# Patient Record
Sex: FEMALE | Race: WHITE | NOT HISPANIC OR LATINO | Employment: UNEMPLOYED | URBAN - METROPOLITAN AREA
[De-identification: names, ages, dates, MRNs, and addresses within clinical notes are randomized per-mention and may not be internally consistent; named-entity substitution may affect disease eponyms.]

---

## 2021-04-15 LAB — CRC RECOMMENDATION EXT: NORMAL

## 2021-09-17 ENCOUNTER — OFFICE VISIT (OUTPATIENT)
Dept: PRIMARY CARE CLINIC | Facility: CLINIC | Age: 56
End: 2021-09-17
Payer: MEDICAID

## 2021-09-17 VITALS
HEIGHT: 66 IN | DIASTOLIC BLOOD PRESSURE: 97 MMHG | WEIGHT: 131 LBS | OXYGEN SATURATION: 94 % | BODY MASS INDEX: 21.05 KG/M2 | SYSTOLIC BLOOD PRESSURE: 153 MMHG | HEART RATE: 120 BPM | RESPIRATION RATE: 18 BRPM

## 2021-09-17 DIAGNOSIS — I10 HYPERTENSION, UNSPECIFIED TYPE: ICD-10-CM

## 2021-09-17 DIAGNOSIS — Z23 IMMUNIZATION DUE: ICD-10-CM

## 2021-09-17 DIAGNOSIS — H02.825 CYST OF LEFT LOWER EYELID: Primary | ICD-10-CM

## 2021-09-17 DIAGNOSIS — F17.210 NICOTINE DEPENDENCE, CIGARETTES, UNCOMPLICATED: ICD-10-CM

## 2021-09-17 DIAGNOSIS — M25.559 HIP PAIN: ICD-10-CM

## 2021-09-17 DIAGNOSIS — F17.200 SMOKER: ICD-10-CM

## 2021-09-17 PROCEDURE — 99204 OFFICE O/P NEW MOD 45 MIN: CPT | Mod: 25,S$GLB,, | Performed by: INTERNAL MEDICINE

## 2021-09-17 PROCEDURE — 90732 PNEUMOCOCCAL POLYSACCHARIDE VACCINE 23-VALENT =>2YO SQ IM: ICD-10-PCS | Mod: S$GLB,,, | Performed by: INTERNAL MEDICINE

## 2021-09-17 PROCEDURE — 90471 IMMUNIZATION ADMIN: CPT | Mod: S$GLB,,, | Performed by: INTERNAL MEDICINE

## 2021-09-17 PROCEDURE — 90471 PNEUMOCOCCAL POLYSACCHARIDE VACCINE 23-VALENT =>2YO SQ IM: ICD-10-PCS | Mod: S$GLB,,, | Performed by: INTERNAL MEDICINE

## 2021-09-17 PROCEDURE — 99204 PR OFFICE/OUTPT VISIT, NEW, LEVL IV, 45-59 MIN: ICD-10-PCS | Mod: 25,S$GLB,, | Performed by: INTERNAL MEDICINE

## 2021-09-17 PROCEDURE — 90732 PPSV23 VACC 2 YRS+ SUBQ/IM: CPT | Mod: S$GLB,,, | Performed by: INTERNAL MEDICINE

## 2021-09-17 RX ORDER — NORTRIPTYLINE HYDROCHLORIDE 50 MG/1
50 CAPSULE ORAL NIGHTLY
COMMUNITY
Start: 2021-09-03 | End: 2021-10-01 | Stop reason: SDUPTHER

## 2021-09-17 RX ORDER — GABAPENTIN 300 MG/1
300 CAPSULE ORAL 2 TIMES DAILY
COMMUNITY
Start: 2021-09-03 | End: 2021-10-01 | Stop reason: SDUPTHER

## 2021-09-17 RX ORDER — PANTOPRAZOLE SODIUM 40 MG/1
40 TABLET, DELAYED RELEASE ORAL DAILY
COMMUNITY
Start: 2021-08-30 | End: 2021-10-01 | Stop reason: SDUPTHER

## 2021-09-17 RX ORDER — ONDANSETRON 4 MG/1
4 TABLET, FILM COATED ORAL 2 TIMES DAILY PRN
COMMUNITY
Start: 2021-09-03 | End: 2021-10-01 | Stop reason: SDUPTHER

## 2021-09-18 LAB
BASOPHILS # BLD AUTO: 89 CELLS/UL (ref 0–200)
BASOPHILS NFR BLD AUTO: 1 %
BUN SERPL-MCNC: 6 MG/DL (ref 7–25)
BUN/CREAT SERPL: 9 (CALC) (ref 6–22)
CALCIUM SERPL-MCNC: 10.3 MG/DL (ref 8.6–10.3)
CHLORIDE SERPL-SCNC: 100 MMOL/L (ref 98–110)
CHOLEST SERPL-MCNC: 207 MG/DL
CHOLEST/HDLC SERPL: 2.5 (CALC)
CO2 SERPL-SCNC: 25 MMOL/L (ref 20–32)
CREAT SERPL-MCNC: 0.67 MG/DL (ref 0.7–1.33)
EOSINOPHIL # BLD AUTO: 169 CELLS/UL (ref 15–500)
EOSINOPHIL NFR BLD AUTO: 1.9 %
ERYTHROCYTE [DISTWIDTH] IN BLOOD BY AUTOMATED COUNT: 13.1 % (ref 11–15)
GLUCOSE SERPL-MCNC: 95 MG/DL (ref 65–99)
HCT VFR BLD AUTO: 47.3 % (ref 38.5–50)
HDLC SERPL-MCNC: 84 MG/DL
HGB BLD-MCNC: 16.3 G/DL (ref 13.2–17.1)
LDLC SERPL CALC-MCNC: 102 MG/DL (CALC)
LYMPHOCYTES # BLD AUTO: 1326 CELLS/UL (ref 850–3900)
LYMPHOCYTES NFR BLD AUTO: 14.9 %
MCH RBC QN AUTO: 33.1 PG (ref 27–33)
MCHC RBC AUTO-ENTMCNC: 34.5 G/DL (ref 32–36)
MCV RBC AUTO: 95.9 FL (ref 80–100)
MONOCYTES # BLD AUTO: 1175 CELLS/UL (ref 200–950)
MONOCYTES NFR BLD AUTO: 13.2 %
NEUTROPHILS # BLD AUTO: 6141 CELLS/UL (ref 1500–7800)
NEUTROPHILS NFR BLD AUTO: 69 %
NONHDLC SERPL-MCNC: 123 MG/DL (CALC)
PLATELET # BLD AUTO: 315 THOUSAND/UL (ref 140–400)
PMV BLD REES-ECKER: 10.1 FL (ref 7.5–12.5)
POTASSIUM SERPL-SCNC: 4.3 MMOL/L (ref 3.5–5.3)
RBC # BLD AUTO: 4.93 MILLION/UL (ref 4.2–5.8)
SODIUM SERPL-SCNC: 135 MMOL/L (ref 135–146)
TRIGL SERPL-MCNC: 112 MG/DL
TSH SERPL-ACNC: 1.22 MIU/L (ref 0.4–4.5)
WBC # BLD AUTO: 8.9 THOUSAND/UL (ref 3.8–10.8)

## 2021-09-23 ENCOUNTER — TELEPHONE (OUTPATIENT)
Dept: PRIMARY CARE CLINIC | Facility: CLINIC | Age: 56
End: 2021-09-23

## 2021-09-27 DIAGNOSIS — R91.8 LUNG MASS: Primary | ICD-10-CM

## 2021-09-28 ENCOUNTER — OFFICE VISIT (OUTPATIENT)
Dept: HEMATOLOGY/ONCOLOGY | Facility: CLINIC | Age: 56
End: 2021-09-28
Payer: MEDICAID

## 2021-09-28 VITALS
SYSTOLIC BLOOD PRESSURE: 166 MMHG | HEART RATE: 88 BPM | TEMPERATURE: 98 F | DIASTOLIC BLOOD PRESSURE: 91 MMHG | BODY MASS INDEX: 21.69 KG/M2 | HEIGHT: 66 IN | WEIGHT: 135 LBS | OXYGEN SATURATION: 95 % | RESPIRATION RATE: 16 BRPM

## 2021-09-28 DIAGNOSIS — R91.8 MASS OF LEFT LUNG: Primary | ICD-10-CM

## 2021-09-28 DIAGNOSIS — R91.8 MASS OF LEFT LUNG: ICD-10-CM

## 2021-09-28 DIAGNOSIS — R91.8 MASS OF LOWER LOBE OF LEFT LUNG: ICD-10-CM

## 2021-09-28 PROCEDURE — 99205 OFFICE O/P NEW HI 60 MIN: CPT | Mod: S$GLB,,, | Performed by: INTERNAL MEDICINE

## 2021-09-28 PROCEDURE — 99205 PR OFFICE/OUTPT VISIT, NEW, LEVL V, 60-74 MIN: ICD-10-PCS | Mod: S$GLB,,, | Performed by: INTERNAL MEDICINE

## 2021-10-01 DIAGNOSIS — K21.9 GASTROESOPHAGEAL REFLUX DISEASE, UNSPECIFIED WHETHER ESOPHAGITIS PRESENT: ICD-10-CM

## 2021-10-01 DIAGNOSIS — M25.559 HIP PAIN: ICD-10-CM

## 2021-10-01 DIAGNOSIS — I10 HYPERTENSION, UNSPECIFIED TYPE: Primary | ICD-10-CM

## 2021-10-01 RX ORDER — ONDANSETRON 4 MG/1
4 TABLET, FILM COATED ORAL EVERY 12 HOURS PRN
Qty: 180 TABLET | Refills: 0 | Status: SHIPPED | OUTPATIENT
Start: 2021-10-01 | End: 2021-12-30

## 2021-10-01 RX ORDER — PANTOPRAZOLE SODIUM 40 MG/1
40 TABLET, DELAYED RELEASE ORAL DAILY
Qty: 30 TABLET | Refills: 3 | Status: SHIPPED | OUTPATIENT
Start: 2021-10-01 | End: 2022-11-01 | Stop reason: SDUPTHER

## 2021-10-01 RX ORDER — NORTRIPTYLINE HYDROCHLORIDE 50 MG/1
50 CAPSULE ORAL NIGHTLY
Qty: 30 CAPSULE | Refills: 0 | Status: SHIPPED | OUTPATIENT
Start: 2021-10-01 | End: 2021-12-01

## 2021-10-01 RX ORDER — GABAPENTIN 300 MG/1
300 CAPSULE ORAL 2 TIMES DAILY
Qty: 60 CAPSULE | Refills: 3 | Status: SHIPPED | OUTPATIENT
Start: 2021-10-01 | End: 2022-02-02 | Stop reason: SDUPTHER

## 2021-10-04 ENCOUNTER — CLINICAL SUPPORT (OUTPATIENT)
Dept: PULMONOLOGY | Facility: CLINIC | Age: 56
End: 2021-10-04
Payer: MEDICAID

## 2021-10-04 ENCOUNTER — TELEPHONE (OUTPATIENT)
Dept: PRIMARY CARE CLINIC | Facility: CLINIC | Age: 56
End: 2021-10-04

## 2021-10-04 ENCOUNTER — OFFICE VISIT (OUTPATIENT)
Dept: PULMONOLOGY | Facility: CLINIC | Age: 56
End: 2021-10-04
Payer: MEDICAID

## 2021-10-04 DIAGNOSIS — R91.8 MASS OF LEFT LUNG: ICD-10-CM

## 2021-10-04 DIAGNOSIS — Z72.0 TOBACCO USE: ICD-10-CM

## 2021-10-04 DIAGNOSIS — J44.9 CHRONIC OBSTRUCTIVE PULMONARY DISEASE, UNSPECIFIED COPD TYPE: Primary | ICD-10-CM

## 2021-10-04 DIAGNOSIS — R91.8 LUNG MASS: Primary | ICD-10-CM

## 2021-10-04 PROCEDURE — 94729 PR C02/MEMBANE DIFFUSE CAPACITY: ICD-10-PCS | Mod: S$GLB,,, | Performed by: INTERNAL MEDICINE

## 2021-10-04 PROCEDURE — 99204 PR OFFICE/OUTPT VISIT, NEW, LEVL IV, 45-59 MIN: ICD-10-PCS | Mod: 25,S$GLB,, | Performed by: INTERNAL MEDICINE

## 2021-10-04 PROCEDURE — 94060 PR EVAL OF BRONCHOSPASM: ICD-10-PCS | Mod: S$GLB,,, | Performed by: INTERNAL MEDICINE

## 2021-10-04 PROCEDURE — 94729 DIFFUSING CAPACITY: CPT | Mod: S$GLB,,, | Performed by: INTERNAL MEDICINE

## 2021-10-04 PROCEDURE — 94060 EVALUATION OF WHEEZING: CPT | Mod: S$GLB,,, | Performed by: INTERNAL MEDICINE

## 2021-10-04 PROCEDURE — 94726 PULM FUNCT TST PLETHYSMOGRAP: ICD-10-PCS | Mod: S$GLB,,, | Performed by: INTERNAL MEDICINE

## 2021-10-04 PROCEDURE — 94726 PLETHYSMOGRAPHY LUNG VOLUMES: CPT | Mod: S$GLB,,, | Performed by: INTERNAL MEDICINE

## 2021-10-04 PROCEDURE — 99204 OFFICE O/P NEW MOD 45 MIN: CPT | Mod: 25,S$GLB,, | Performed by: INTERNAL MEDICINE

## 2021-10-04 RX ORDER — ALBUTEROL SULFATE 90 UG/1
2 AEROSOL, METERED RESPIRATORY (INHALATION) EVERY 6 HOURS PRN
Qty: 18 G | Refills: 0 | Status: SHIPPED | OUTPATIENT
Start: 2021-10-04 | End: 2022-01-03 | Stop reason: SDUPTHER

## 2021-10-04 RX ORDER — BUDESONIDE AND FORMOTEROL FUMARATE DIHYDRATE 160; 4.5 UG/1; UG/1
2 AEROSOL RESPIRATORY (INHALATION) EVERY 12 HOURS
Qty: 1 INHALER | Refills: 11 | Status: SHIPPED | OUTPATIENT
Start: 2021-10-04 | End: 2023-01-12 | Stop reason: ALTCHOICE

## 2021-10-06 ENCOUNTER — TELEPHONE (OUTPATIENT)
Dept: HEMATOLOGY/ONCOLOGY | Facility: CLINIC | Age: 56
End: 2021-10-06

## 2021-10-06 ENCOUNTER — TELEPHONE (OUTPATIENT)
Dept: PRIMARY CARE CLINIC | Facility: CLINIC | Age: 56
End: 2021-10-06

## 2021-10-07 ENCOUNTER — TELEPHONE (OUTPATIENT)
Dept: HEMATOLOGY/ONCOLOGY | Facility: CLINIC | Age: 56
End: 2021-10-07

## 2021-10-12 ENCOUNTER — OFFICE VISIT (OUTPATIENT)
Dept: HEMATOLOGY/ONCOLOGY | Facility: CLINIC | Age: 56
End: 2021-10-12
Payer: MEDICAID

## 2021-10-12 VITALS
HEART RATE: 98 BPM | DIASTOLIC BLOOD PRESSURE: 84 MMHG | OXYGEN SATURATION: 94 % | BODY MASS INDEX: 21.53 KG/M2 | SYSTOLIC BLOOD PRESSURE: 150 MMHG | WEIGHT: 133.38 LBS

## 2021-10-12 DIAGNOSIS — Z12.89 ENCOUNTER FOR SCREENING FOR MALIGNANT NEOPLASM OF OTHER SITES: ICD-10-CM

## 2021-10-12 DIAGNOSIS — R91.8 MASS OF LOWER LOBE OF LEFT LUNG: ICD-10-CM

## 2021-10-12 DIAGNOSIS — R91.8 MASS OF LEFT LUNG: Primary | ICD-10-CM

## 2021-10-12 PROCEDURE — 99214 OFFICE O/P EST MOD 30 MIN: CPT | Mod: S$GLB,,, | Performed by: INTERNAL MEDICINE

## 2021-10-12 PROCEDURE — 99214 PR OFFICE/OUTPT VISIT, EST, LEVL IV, 30-39 MIN: ICD-10-PCS | Mod: S$GLB,,, | Performed by: INTERNAL MEDICINE

## 2021-10-13 ENCOUNTER — TELEPHONE (OUTPATIENT)
Dept: HEMATOLOGY/ONCOLOGY | Facility: CLINIC | Age: 56
End: 2021-10-13

## 2021-10-13 ENCOUNTER — OFFICE VISIT (OUTPATIENT)
Dept: PRIMARY CARE CLINIC | Facility: CLINIC | Age: 56
End: 2021-10-13
Payer: MEDICAID

## 2021-10-13 VITALS
WEIGHT: 134.19 LBS | BODY MASS INDEX: 21.57 KG/M2 | RESPIRATION RATE: 16 BRPM | HEIGHT: 66 IN | DIASTOLIC BLOOD PRESSURE: 76 MMHG | SYSTOLIC BLOOD PRESSURE: 122 MMHG | OXYGEN SATURATION: 95 % | TEMPERATURE: 98 F | HEART RATE: 104 BPM

## 2021-10-13 DIAGNOSIS — Z12.39 ENCOUNTER FOR SCREENING FOR MALIGNANT NEOPLASM OF BREAST, UNSPECIFIED SCREENING MODALITY: Primary | ICD-10-CM

## 2021-10-13 DIAGNOSIS — F17.200 SMOKER: ICD-10-CM

## 2021-10-13 DIAGNOSIS — R91.8 MASS OF LEFT LUNG: ICD-10-CM

## 2021-10-13 PROCEDURE — 99214 PR OFFICE/OUTPT VISIT, EST, LEVL IV, 30-39 MIN: ICD-10-PCS | Mod: S$GLB,,, | Performed by: INTERNAL MEDICINE

## 2021-10-13 PROCEDURE — 99214 OFFICE O/P EST MOD 30 MIN: CPT | Mod: S$GLB,,, | Performed by: INTERNAL MEDICINE

## 2021-10-19 DIAGNOSIS — R91.8 MASS OF LEFT LUNG: Primary | ICD-10-CM

## 2021-10-25 ENCOUNTER — TELEPHONE (OUTPATIENT)
Dept: PULMONOLOGY | Facility: CLINIC | Age: 56
End: 2021-10-25
Payer: MEDICAID

## 2021-10-28 ENCOUNTER — TELEPHONE (OUTPATIENT)
Dept: PULMONOLOGY | Facility: CLINIC | Age: 56
End: 2021-10-28
Payer: MEDICAID

## 2021-11-03 ENCOUNTER — TELEPHONE (OUTPATIENT)
Dept: PRIMARY CARE CLINIC | Facility: CLINIC | Age: 56
End: 2021-11-03
Payer: MEDICAID

## 2021-11-08 LAB
ABS NRBC COUNT: 0 THOU/UL (ref 0–0.01)
ABSOLUTE BASOPHIL: 0.1 10*3/UL (ref 0–0.3)
ABSOLUTE EOSINOPHIL: 0.2 10*3/UL (ref 0–0.6)
ABSOLUTE IMMATURE GRAN: 0.04 THOU/UL (ref 0–0.03)
ABSOLUTE LYMPHOCYTE: 1.1 10*3/UL (ref 1.2–4)
ABSOLUTE MONOCYTE: 1 10*3/UL (ref 0.1–0.8)
ALBUMIN SERPL BCP-MCNC: 3.3 G/DL (ref 3.4–5)
ALP SERPL-CCNC: 142 U/L (ref 45–117)
ALT SERPL W P-5'-P-CCNC: 74 U/L (ref 13–56)
ANION GAP SERPL CALC-SCNC: 8 MMOL/L (ref 3–11)
APTT PPP: 33.3 SEC (ref 25.1–36.5)
AST SERPL-CCNC: 46 U/L (ref 15–37)
BASOPHILS NFR BLD: 1.1 % (ref 0–3)
BILIRUB SERPL-MCNC: 0.5 MG/DL (ref 0.2–1)
BUN SERPL-MCNC: 4 MG/DL (ref 7–18)
CALCIUM BLD-MCNC: POSITIVE MG/DL
CALCIUM SERPL-MCNC: 9.6 MG/DL (ref 8.5–10.1)
CHLORIDE SERPL-SCNC: 101 MMOL/L (ref 98–107)
CO2 SERPL-SCNC: 26 MMOL/L (ref 21–32)
COVID-19 AB, IGM: NEGATIVE
CREAT SERPL-MCNC: 0.71 MG/DL (ref 0.55–1.02)
EOSINOPHIL NFR BLD: 2.6 % (ref 0–6)
ERYTHROCYTE [DISTWIDTH] IN BLOOD BY AUTOMATED COUNT: 12.6 % (ref 0–15.5)
GFR ESTIMATION: > 60
GLUCOSE SERPL-MCNC: 141 MG/DL (ref 74–106)
HCT VFR BLD AUTO: 43.4 % (ref 37–47)
HGB BLD-MCNC: 14.6 G/DL (ref 12–16)
IMMATURE GRANULOCYTES: 0.5 % (ref 0–0.43)
INR PPP: 1 INR (ref 0.9–1.1)
LYMPHOCYTES NFR BLD: 14.5 % (ref 20–45)
MCH RBC QN AUTO: 33.4 PG (ref 27–32)
MCHC RBC AUTO-ENTMCNC: 33.6 % (ref 32–36)
MCV RBC AUTO: 99.3 FL (ref 80–99)
MONOCYTES NFR BLD: 13 % (ref 2–10)
NEUTROPHILS # BLD AUTO: 5.1 10*3/UL (ref 1.4–7)
NEUTROPHILS NFR BLD: 68.3 % (ref 50–80)
NUCLEATED RED BLOOD CELLS: 0 % (ref 0–0.2)
PLATELETS: 310 10*3/UL (ref 130–400)
PMV BLD AUTO: 9.4 FL (ref 9.2–12.2)
POTASSIUM SERPL-SCNC: 4 MMOL/L (ref 3.5–5.1)
PROT SERPL-MCNC: 8.5 G/DL (ref 6.4–8.2)
PROTHROMBIN TIME: 11.5 SEC (ref 10.2–12.9)
RBC # BLD AUTO: 4.37 10*6/UL (ref 4.2–5.4)
SODIUM BLD-SCNC: 135 MMOL/L (ref 131–143)
WBC # BLD: 7.4 10*3/UL (ref 4.5–10)

## 2021-11-09 ENCOUNTER — OUTSIDE PLACE OF SERVICE (OUTPATIENT)
Dept: PULMONOLOGY | Facility: CLINIC | Age: 56
End: 2021-11-09
Payer: MEDICAID

## 2021-11-09 PROCEDURE — 31654 PR BRONCH W/ EBUS, DIAG OR THERA INTERVENTION PERIPHERAL LESION(S), INCL GUID, ADD ON CODE: ICD-10-PCS | Mod: ,,, | Performed by: INTERNAL MEDICINE

## 2021-11-09 PROCEDURE — 31629 BRONCHOSCOPY/NEEDLE BX EACH: CPT | Mod: LT,,, | Performed by: INTERNAL MEDICINE

## 2021-11-09 PROCEDURE — 31629 PR BRONCHOSCOPY,TRANSBRON ASPIR BX: ICD-10-PCS | Mod: LT,,, | Performed by: INTERNAL MEDICINE

## 2021-11-09 PROCEDURE — 31654 BRONCH EBUS IVNTJ PERPH LES: CPT | Mod: ,,, | Performed by: INTERNAL MEDICINE

## 2021-11-09 PROCEDURE — 31625 BRONCHOSCOPY W/BIOPSY(S): CPT | Mod: 59,LT,, | Performed by: INTERNAL MEDICINE

## 2021-11-09 PROCEDURE — 31625 PR BRONCHOSCOPY,BIOPSY: ICD-10-PCS | Mod: 59,LT,, | Performed by: INTERNAL MEDICINE

## 2021-11-10 LAB — SPECIMEN TO PATHOLOGY: NORMAL

## 2021-11-12 LAB
SPECIMEN TO PATHOLOGY: NORMAL
SPECIMEN TO PATHOLOGY: NORMAL

## 2021-11-15 ENCOUNTER — OFFICE VISIT (OUTPATIENT)
Dept: HEMATOLOGY/ONCOLOGY | Facility: CLINIC | Age: 56
End: 2021-11-15
Payer: MEDICAID

## 2021-11-15 ENCOUNTER — TELEPHONE (OUTPATIENT)
Dept: HEMATOLOGY/ONCOLOGY | Facility: CLINIC | Age: 56
End: 2021-11-15

## 2021-11-15 VITALS
RESPIRATION RATE: 16 BRPM | SYSTOLIC BLOOD PRESSURE: 150 MMHG | HEIGHT: 66 IN | HEART RATE: 94 BPM | DIASTOLIC BLOOD PRESSURE: 86 MMHG | TEMPERATURE: 97 F | BODY MASS INDEX: 21.53 KG/M2 | WEIGHT: 134 LBS | OXYGEN SATURATION: 94 %

## 2021-11-15 DIAGNOSIS — C34.90 SMALL CELL LUNG CANCER: Primary | ICD-10-CM

## 2021-11-15 PROCEDURE — 99215 OFFICE O/P EST HI 40 MIN: CPT | Mod: S$GLB,,, | Performed by: INTERNAL MEDICINE

## 2021-11-15 PROCEDURE — 99215 PR OFFICE/OUTPT VISIT, EST, LEVL V, 40-54 MIN: ICD-10-PCS | Mod: S$GLB,,, | Performed by: INTERNAL MEDICINE

## 2021-11-16 ENCOUNTER — TELEPHONE (OUTPATIENT)
Dept: HEMATOLOGY/ONCOLOGY | Facility: CLINIC | Age: 56
End: 2021-11-16
Payer: MEDICAID

## 2021-11-17 ENCOUNTER — OFFICE VISIT (OUTPATIENT)
Dept: SURGERY | Facility: CLINIC | Age: 56
End: 2021-11-17
Payer: MEDICAID

## 2021-11-17 DIAGNOSIS — C34.90 SMALL CELL LUNG CANCER: Primary | ICD-10-CM

## 2021-11-17 LAB
CALCIUM BLD-MCNC: POSITIVE MG/DL
COVID-19 AB, IGM: NEGATIVE

## 2021-11-17 PROCEDURE — 99203 PR OFFICE/OUTPT VISIT, NEW, LEVL III, 30-44 MIN: ICD-10-PCS | Mod: S$GLB,,, | Performed by: SURGERY

## 2021-11-17 PROCEDURE — 99203 OFFICE O/P NEW LOW 30 MIN: CPT | Mod: S$GLB,,, | Performed by: SURGERY

## 2021-11-18 ENCOUNTER — OUTSIDE PLACE OF SERVICE (OUTPATIENT)
Dept: ADMINISTRATIVE | Facility: OTHER | Age: 56
End: 2021-11-18
Payer: MEDICAID

## 2021-11-18 PROCEDURE — 36561 PR INSERT TUNNELED CV CATH WITH PORT: ICD-10-PCS | Mod: LT,,, | Performed by: SURGERY

## 2021-11-18 PROCEDURE — 36561 INSERT TUNNELED CV CATH: CPT | Mod: LT,,, | Performed by: SURGERY

## 2021-11-18 PROCEDURE — 77001 CHG FLUOROGUIDE CNTRL VEN ACCESS,PLACE,REPLACE,REMOVE: ICD-10-PCS | Mod: 26,,, | Performed by: SURGERY

## 2021-11-18 PROCEDURE — 77001 FLUOROGUIDE FOR VEIN DEVICE: CPT | Mod: 26,,, | Performed by: SURGERY

## 2021-11-23 ENCOUNTER — OFFICE VISIT (OUTPATIENT)
Dept: HEMATOLOGY/ONCOLOGY | Facility: CLINIC | Age: 56
End: 2021-11-23
Payer: MEDICAID

## 2021-11-23 VITALS
SYSTOLIC BLOOD PRESSURE: 143 MMHG | OXYGEN SATURATION: 93 % | TEMPERATURE: 97 F | RESPIRATION RATE: 18 BRPM | DIASTOLIC BLOOD PRESSURE: 88 MMHG | BODY MASS INDEX: 21.34 KG/M2 | WEIGHT: 132.81 LBS | HEIGHT: 66 IN | HEART RATE: 104 BPM

## 2021-11-23 DIAGNOSIS — Z71.9 ENCOUNTER FOR EDUCATION: ICD-10-CM

## 2021-11-23 DIAGNOSIS — C34.90 SMALL CELL LUNG CANCER: Primary | ICD-10-CM

## 2021-11-23 DIAGNOSIS — Z51.11 ENCOUNTER FOR ANTINEOPLASTIC CHEMOTHERAPY: ICD-10-CM

## 2021-11-23 LAB
ALBUMIN SERPL BCP-MCNC: 3.4 G/DL (ref 3.4–5)
ALBUMIN/GLOBULIN RATIO: 0.69 RATIO (ref 1.1–1.8)
ALP SERPL-CCNC: 129 U/L (ref 46–116)
ALT SERPL W P-5'-P-CCNC: 65 U/L (ref 12–78)
ANION GAP SERPL CALC-SCNC: 11 MMOL/L (ref 3–11)
AST SERPL-CCNC: 52 U/L (ref 15–37)
BASOPHILS NFR BLD: 0.9 % (ref 0–3)
BILIRUB SERPL-MCNC: 0.6 MG/DL (ref 0–1)
BUN SERPL-MCNC: 6 MG/DL (ref 7–18)
BUN/CREAT SERPL: 8.95 RATIO (ref 7–18)
CALCIUM SERPL-MCNC: 9 MG/DL (ref 8.8–10.5)
CHLORIDE SERPL-SCNC: 99 MMOL/L (ref 100–108)
CO2 SERPL-SCNC: 25 MMOL/L (ref 21–32)
CREAT SERPL-MCNC: 0.67 MG/DL (ref 0.55–1.02)
EOSINOPHIL NFR BLD: 1.8 % (ref 1–3)
ERYTHROCYTE [DISTWIDTH] IN BLOOD BY AUTOMATED COUNT: 13.2 % (ref 12.5–18)
GFR ESTIMATION: > 60
GLOBULIN: 4.9 G/DL (ref 2.3–3.5)
GLUCOSE SERPL-MCNC: 123 MG/DL (ref 70–110)
HCT VFR BLD AUTO: 41.8 % (ref 37–47)
HGB BLD-MCNC: 14.1 G/DL (ref 12–16)
LYMPHOCYTES NFR BLD: 10.1 % (ref 25–40)
MCH RBC QN AUTO: 33.1 PG (ref 27–31.2)
MCHC RBC AUTO-ENTMCNC: 33.7 G/DL (ref 31.8–35.4)
MCV RBC AUTO: 98.1 FL (ref 80–97)
MONOCYTES NFR BLD: 12.3 % (ref 1–15)
NEUTROPHILS # BLD AUTO: 7.75 10*3/UL (ref 1.8–7.7)
NEUTROPHILS NFR BLD: 74.1 % (ref 37–80)
NUCLEATED RED BLOOD CELLS: 0 %
PLATELETS: 326 10*3/UL (ref 142–424)
POTASSIUM SERPL-SCNC: 4.4 MMOL/L (ref 3.6–5.2)
PROT SERPL-MCNC: 8.3 G/DL (ref 6.4–8.2)
RBC # BLD AUTO: 4.26 10*6/UL (ref 4.2–5.4)
SODIUM BLD-SCNC: 135 MMOL/L (ref 135–145)
WBC # BLD: 10.4 10*3/UL (ref 4.6–10.2)

## 2021-11-23 PROCEDURE — 99215 OFFICE O/P EST HI 40 MIN: CPT | Mod: S$GLB,,, | Performed by: NURSE PRACTITIONER

## 2021-11-23 PROCEDURE — 99215 PR OFFICE/OUTPT VISIT, EST, LEVL V, 40-54 MIN: ICD-10-PCS | Mod: S$GLB,,, | Performed by: NURSE PRACTITIONER

## 2021-11-23 RX ORDER — HEPARIN 100 UNIT/ML
500 SYRINGE INTRAVENOUS
Status: CANCELLED | OUTPATIENT
Start: 2021-12-02

## 2021-11-23 RX ORDER — SODIUM CHLORIDE 0.9 % (FLUSH) 0.9 %
10 SYRINGE (ML) INJECTION
Status: CANCELLED | OUTPATIENT
Start: 2021-12-03

## 2021-11-23 RX ORDER — PROMETHAZINE HYDROCHLORIDE 25 MG/1
25 TABLET ORAL EVERY 8 HOURS
Qty: 30 TABLET | Refills: 3 | Status: SHIPPED | OUTPATIENT
Start: 2021-11-23 | End: 2022-03-21

## 2021-11-23 RX ORDER — SODIUM CHLORIDE 0.9 % (FLUSH) 0.9 %
10 SYRINGE (ML) INJECTION
Status: CANCELLED | OUTPATIENT
Start: 2021-12-02

## 2021-11-23 RX ORDER — HEPARIN 100 UNIT/ML
500 SYRINGE INTRAVENOUS
Status: CANCELLED | OUTPATIENT
Start: 2021-12-03

## 2021-11-23 RX ORDER — ONDANSETRON 8 MG/1
8 TABLET, ORALLY DISINTEGRATING ORAL EVERY 6 HOURS PRN
Qty: 30 TABLET | Refills: 3 | Status: SHIPPED | OUTPATIENT
Start: 2021-11-23 | End: 2022-02-02 | Stop reason: SDUPTHER

## 2021-11-23 RX ORDER — SODIUM CHLORIDE 0.9 % (FLUSH) 0.9 %
10 SYRINGE (ML) INJECTION
Status: CANCELLED | OUTPATIENT
Start: 2021-12-01

## 2021-11-23 RX ORDER — HEPARIN 100 UNIT/ML
500 SYRINGE INTRAVENOUS
Status: CANCELLED | OUTPATIENT
Start: 2021-12-01

## 2021-11-29 ENCOUNTER — TELEPHONE (OUTPATIENT)
Dept: HEMATOLOGY/ONCOLOGY | Facility: CLINIC | Age: 56
End: 2021-11-29
Payer: MEDICAID

## 2021-11-30 ENCOUNTER — TELEPHONE (OUTPATIENT)
Dept: PRIMARY CARE CLINIC | Facility: CLINIC | Age: 56
End: 2021-11-30
Payer: MEDICAID

## 2021-11-30 ENCOUNTER — PATIENT MESSAGE (OUTPATIENT)
Dept: PRIMARY CARE CLINIC | Facility: CLINIC | Age: 56
End: 2021-11-30
Payer: MEDICAID

## 2021-11-30 DIAGNOSIS — Z01.419 WOMEN'S ANNUAL ROUTINE GYNECOLOGICAL EXAMINATION: Primary | ICD-10-CM

## 2021-12-02 ENCOUNTER — OFFICE VISIT (OUTPATIENT)
Dept: OBSTETRICS AND GYNECOLOGY | Facility: CLINIC | Age: 56
End: 2021-12-02
Payer: MEDICAID

## 2021-12-02 VITALS
BODY MASS INDEX: 21.53 KG/M2 | HEART RATE: 89 BPM | SYSTOLIC BLOOD PRESSURE: 158 MMHG | DIASTOLIC BLOOD PRESSURE: 87 MMHG | WEIGHT: 134 LBS | HEIGHT: 66 IN

## 2021-12-02 DIAGNOSIS — Z01.419 WELL WOMAN EXAM WITH ROUTINE GYNECOLOGICAL EXAM: ICD-10-CM

## 2021-12-02 DIAGNOSIS — L30.9 VULVAR DERMATITIS: ICD-10-CM

## 2021-12-02 DIAGNOSIS — Z12.31 SCREENING MAMMOGRAM FOR BREAST CANCER: ICD-10-CM

## 2021-12-02 DIAGNOSIS — Z12.4 SCREENING FOR MALIGNANT NEOPLASM OF THE CERVIX: Primary | ICD-10-CM

## 2021-12-02 PROCEDURE — 99386 PREV VISIT NEW AGE 40-64: CPT | Mod: S$GLB,,, | Performed by: NURSE PRACTITIONER

## 2021-12-02 PROCEDURE — 99386 PR PREVENTIVE VISIT,NEW,40-64: ICD-10-PCS | Mod: S$GLB,,, | Performed by: NURSE PRACTITIONER

## 2021-12-02 RX ORDER — CLOBETASOL PROPIONATE 0.5 MG/G
OINTMENT TOPICAL 2 TIMES DAILY
Qty: 30 G | Refills: 0 | Status: SHIPPED | OUTPATIENT
Start: 2021-12-02 | End: 2022-02-10

## 2021-12-20 ENCOUNTER — PATIENT MESSAGE (OUTPATIENT)
Dept: HEMATOLOGY/ONCOLOGY | Facility: CLINIC | Age: 56
End: 2021-12-20

## 2021-12-20 LAB
ALBUMIN SERPL BCP-MCNC: 3.4 G/DL (ref 3.4–5)
ALBUMIN/GLOBULIN RATIO: 0.83 RATIO (ref 1.1–1.8)
ALP SERPL-CCNC: 118 U/L (ref 46–116)
ALT SERPL W P-5'-P-CCNC: 46 U/L (ref 12–78)
ANION GAP SERPL CALC-SCNC: 9 MMOL/L (ref 3–11)
AST SERPL-CCNC: 29 U/L (ref 15–37)
BASOPHILS NFR BLD: 0.7 % (ref 0–3)
BILIRUB SERPL-MCNC: 0.2 MG/DL (ref 0–1)
BUN SERPL-MCNC: 7 MG/DL (ref 7–18)
BUN/CREAT SERPL: 10.6 RATIO (ref 7–18)
CALCIUM SERPL-MCNC: 9.4 MG/DL (ref 8.8–10.5)
CHLORIDE SERPL-SCNC: 98 MMOL/L (ref 100–108)
CO2 SERPL-SCNC: 28 MMOL/L (ref 21–32)
CREAT SERPL-MCNC: 0.66 MG/DL (ref 0.55–1.02)
EOSINOPHIL NFR BLD: 0.5 % (ref 1–3)
ERYTHROCYTE [DISTWIDTH] IN BLOOD BY AUTOMATED COUNT: 12.5 % (ref 12.5–18)
GFR ESTIMATION: > 60
GLOBULIN: 4.1 G/DL (ref 2.3–3.5)
GLUCOSE SERPL-MCNC: 139 MG/DL (ref 70–110)
HCT VFR BLD AUTO: 33.2 % (ref 37–47)
HGB BLD-MCNC: 11.5 G/DL (ref 12–16)
LYMPHOCYTES NFR BLD: 12.1 % (ref 25–40)
MCH RBC QN AUTO: 33.6 PG (ref 27–31.2)
MCHC RBC AUTO-ENTMCNC: 34.6 G/DL (ref 31.8–35.4)
MCV RBC AUTO: 97.1 FL (ref 80–97)
MONOCYTES NFR BLD: 14.8 % (ref 1–15)
NEUTROPHILS # BLD AUTO: 3.81 10*3/UL (ref 1.8–7.7)
NEUTROPHILS NFR BLD: 67 % (ref 37–80)
NUCLEATED RED BLOOD CELLS: 0 %
PLATELETS: 379 10*3/UL (ref 142–424)
POTASSIUM SERPL-SCNC: 4.6 MMOL/L (ref 3.6–5.2)
PROT SERPL-MCNC: 7.5 G/DL (ref 6.4–8.2)
RBC # BLD AUTO: 3.42 10*6/UL (ref 4.2–5.4)
SODIUM BLD-SCNC: 135 MMOL/L (ref 135–145)
WBC # BLD: 5.7 10*3/UL (ref 4.6–10.2)

## 2021-12-21 ENCOUNTER — OFFICE VISIT (OUTPATIENT)
Dept: HEMATOLOGY/ONCOLOGY | Facility: CLINIC | Age: 56
End: 2021-12-21
Payer: MEDICAID

## 2021-12-21 VITALS
DIASTOLIC BLOOD PRESSURE: 85 MMHG | BODY MASS INDEX: 21.49 KG/M2 | HEART RATE: 100 BPM | OXYGEN SATURATION: 98 % | WEIGHT: 133.69 LBS | RESPIRATION RATE: 16 BRPM | SYSTOLIC BLOOD PRESSURE: 134 MMHG | TEMPERATURE: 98 F | HEIGHT: 66 IN

## 2021-12-21 DIAGNOSIS — C34.90 SMALL CELL LUNG CANCER: Primary | ICD-10-CM

## 2021-12-21 DIAGNOSIS — Z51.11 ENCOUNTER FOR ANTINEOPLASTIC CHEMOTHERAPY: ICD-10-CM

## 2021-12-21 PROCEDURE — 99214 OFFICE O/P EST MOD 30 MIN: CPT | Mod: S$GLB,,, | Performed by: NURSE PRACTITIONER

## 2021-12-21 PROCEDURE — 99214 PR OFFICE/OUTPT VISIT, EST, LEVL IV, 30-39 MIN: ICD-10-PCS | Mod: S$GLB,,, | Performed by: NURSE PRACTITIONER

## 2021-12-21 RX ORDER — HEPARIN 100 UNIT/ML
500 SYRINGE INTRAVENOUS
Status: CANCELLED | OUTPATIENT
Start: 2021-12-23

## 2021-12-21 RX ORDER — SODIUM CHLORIDE 0.9 % (FLUSH) 0.9 %
10 SYRINGE (ML) INJECTION
Status: CANCELLED | OUTPATIENT
Start: 2021-12-22

## 2021-12-21 RX ORDER — HEPARIN 100 UNIT/ML
500 SYRINGE INTRAVENOUS
Status: CANCELLED | OUTPATIENT
Start: 2021-12-21

## 2021-12-21 RX ORDER — SODIUM CHLORIDE 0.9 % (FLUSH) 0.9 %
10 SYRINGE (ML) INJECTION
Status: CANCELLED | OUTPATIENT
Start: 2021-12-21

## 2021-12-21 RX ORDER — SODIUM CHLORIDE 0.9 % (FLUSH) 0.9 %
10 SYRINGE (ML) INJECTION
Status: CANCELLED | OUTPATIENT
Start: 2021-12-23

## 2021-12-21 RX ORDER — HEPARIN 100 UNIT/ML
500 SYRINGE INTRAVENOUS
Status: CANCELLED | OUTPATIENT
Start: 2021-12-22

## 2021-12-29 DIAGNOSIS — I10 HYPERTENSION, UNSPECIFIED TYPE: ICD-10-CM

## 2021-12-30 RX ORDER — NORTRIPTYLINE HYDROCHLORIDE 50 MG/1
CAPSULE ORAL
Qty: 30 CAPSULE | Refills: 0 | Status: SHIPPED | OUTPATIENT
Start: 2021-12-30 | End: 2022-01-27

## 2022-01-03 ENCOUNTER — PATIENT MESSAGE (OUTPATIENT)
Dept: PRIMARY CARE CLINIC | Facility: CLINIC | Age: 57
End: 2022-01-03
Payer: MEDICAID

## 2022-01-03 RX ORDER — ALBUTEROL SULFATE 90 UG/1
2 AEROSOL, METERED RESPIRATORY (INHALATION) EVERY 6 HOURS PRN
Qty: 18 G | Refills: 0 | Status: SHIPPED | OUTPATIENT
Start: 2022-01-03 | End: 2022-02-10

## 2022-01-03 NOTE — TELEPHONE ENCOUNTER
----- Message from Vivien Dougherty sent at 1/3/2022 12:01 PM CST -----  pt states that she needs call back regarding status of albuterol refill, needs asap..[phone]

## 2022-01-10 LAB
ALBUMIN SERPL BCP-MCNC: 3.4 G/DL (ref 3.4–5)
ALBUMIN/GLOBULIN RATIO: 0.97 RATIO (ref 1.1–1.8)
ALP SERPL-CCNC: 99 U/L (ref 46–116)
ALT SERPL W P-5'-P-CCNC: 45 U/L (ref 12–78)
ANION GAP SERPL CALC-SCNC: 13 MMOL/L (ref 3–11)
AST SERPL-CCNC: 25 U/L (ref 15–37)
BANDS: 4 % (ref 0–5)
BILIRUB SERPL-MCNC: 0.3 MG/DL (ref 0–1)
BUN SERPL-MCNC: 9 MG/DL (ref 7–18)
BUN/CREAT SERPL: 16.07 RATIO (ref 7–18)
CALCIUM SERPL-MCNC: 9.1 MG/DL (ref 8.8–10.5)
CELLS COUNTED: 100
CHLORIDE SERPL-SCNC: 103 MMOL/L (ref 100–108)
CO2 SERPL-SCNC: 25 MMOL/L (ref 21–32)
CREAT SERPL-MCNC: 0.56 MG/DL (ref 0.55–1.02)
ERYTHROCYTE [DISTWIDTH] IN BLOOD BY AUTOMATED COUNT: 14.3 % (ref 12.5–18)
GFR ESTIMATION: > 60
GLOBULIN: 3.5 G/DL (ref 2.3–3.5)
GLUCOSE SERPL-MCNC: 127 MG/DL (ref 70–110)
HCT VFR BLD AUTO: 28.5 % (ref 37–47)
HGB BLD-MCNC: 9.6 G/DL (ref 12–16)
LYMPHOCYTES NFR BLD: 10 % (ref 25–40)
MACROCYTES BLD QL SMEAR: ABNORMAL
MCH RBC QN AUTO: 34.3 PG (ref 27–31.2)
MCHC RBC AUTO-ENTMCNC: 33.7 G/DL (ref 31.8–35.4)
MCV RBC AUTO: 101.8 FL (ref 80–97)
METAMYELOCYTES # BLD MANUAL: 1 % (ref 0–0)
MONOCYTES NFR BLD: 8 % (ref 1–15)
NEUTROPHILS # BLD AUTO: 6.1 10*3/UL (ref 1.8–7.7)
NEUTROPHILS NFR BLD: 77 % (ref 37–80)
NUCLEATED RED BLOOD CELLS: 0 %
PLATELETS: 153 10*3/UL (ref 142–424)
POTASSIUM SERPL-SCNC: 4.1 MMOL/L (ref 3.6–5.2)
PROT SERPL-MCNC: 6.9 G/DL (ref 6.4–8.2)
RBC # BLD AUTO: 2.8 10*6/UL (ref 4.2–5.4)
SMALL PLATELETS BLD QL SMEAR: ADEQUATE
SODIUM BLD-SCNC: 141 MMOL/L (ref 135–145)
WBC # BLD: 7.5 10*3/UL (ref 4.6–10.2)

## 2022-01-11 ENCOUNTER — OFFICE VISIT (OUTPATIENT)
Dept: HEMATOLOGY/ONCOLOGY | Facility: CLINIC | Age: 57
End: 2022-01-11
Payer: MEDICAID

## 2022-01-11 VITALS
RESPIRATION RATE: 16 BRPM | TEMPERATURE: 97 F | OXYGEN SATURATION: 95 % | BODY MASS INDEX: 21.88 KG/M2 | SYSTOLIC BLOOD PRESSURE: 127 MMHG | HEIGHT: 66 IN | WEIGHT: 136.13 LBS | HEART RATE: 90 BPM | DIASTOLIC BLOOD PRESSURE: 83 MMHG

## 2022-01-11 DIAGNOSIS — C34.90 SMALL CELL LUNG CANCER: Primary | ICD-10-CM

## 2022-01-11 PROCEDURE — 99214 PR OFFICE/OUTPT VISIT, EST, LEVL IV, 30-39 MIN: ICD-10-PCS | Mod: S$GLB,,, | Performed by: NURSE PRACTITIONER

## 2022-01-11 PROCEDURE — 1159F MED LIST DOCD IN RCRD: CPT | Mod: CPTII,S$GLB,, | Performed by: NURSE PRACTITIONER

## 2022-01-11 PROCEDURE — 99214 OFFICE O/P EST MOD 30 MIN: CPT | Mod: S$GLB,,, | Performed by: NURSE PRACTITIONER

## 2022-01-11 PROCEDURE — 3074F PR MOST RECENT SYSTOLIC BLOOD PRESSURE < 130 MM HG: ICD-10-PCS | Mod: CPTII,S$GLB,, | Performed by: NURSE PRACTITIONER

## 2022-01-11 PROCEDURE — 1160F RVW MEDS BY RX/DR IN RCRD: CPT | Mod: CPTII,S$GLB,, | Performed by: NURSE PRACTITIONER

## 2022-01-11 PROCEDURE — 3079F PR MOST RECENT DIASTOLIC BLOOD PRESSURE 80-89 MM HG: ICD-10-PCS | Mod: CPTII,S$GLB,, | Performed by: NURSE PRACTITIONER

## 2022-01-11 PROCEDURE — 3074F SYST BP LT 130 MM HG: CPT | Mod: CPTII,S$GLB,, | Performed by: NURSE PRACTITIONER

## 2022-01-11 PROCEDURE — 3008F PR BODY MASS INDEX (BMI) DOCUMENTED: ICD-10-PCS | Mod: CPTII,S$GLB,, | Performed by: NURSE PRACTITIONER

## 2022-01-11 PROCEDURE — 3008F BODY MASS INDEX DOCD: CPT | Mod: CPTII,S$GLB,, | Performed by: NURSE PRACTITIONER

## 2022-01-11 PROCEDURE — 1159F PR MEDICATION LIST DOCUMENTED IN MEDICAL RECORD: ICD-10-PCS | Mod: CPTII,S$GLB,, | Performed by: NURSE PRACTITIONER

## 2022-01-11 PROCEDURE — 1160F PR REVIEW ALL MEDS BY PRESCRIBER/CLIN PHARMACIST DOCUMENTED: ICD-10-PCS | Mod: CPTII,S$GLB,, | Performed by: NURSE PRACTITIONER

## 2022-01-11 PROCEDURE — 3079F DIAST BP 80-89 MM HG: CPT | Mod: CPTII,S$GLB,, | Performed by: NURSE PRACTITIONER

## 2022-01-11 RX ORDER — HEPARIN 100 UNIT/ML
500 SYRINGE INTRAVENOUS
Status: CANCELLED | OUTPATIENT
Start: 2022-01-12

## 2022-01-11 RX ORDER — SODIUM CHLORIDE 0.9 % (FLUSH) 0.9 %
10 SYRINGE (ML) INJECTION
Status: CANCELLED | OUTPATIENT
Start: 2022-01-11

## 2022-01-11 RX ORDER — HEPARIN 100 UNIT/ML
500 SYRINGE INTRAVENOUS
Status: CANCELLED | OUTPATIENT
Start: 2022-01-13

## 2022-01-11 RX ORDER — SODIUM CHLORIDE 0.9 % (FLUSH) 0.9 %
10 SYRINGE (ML) INJECTION
Status: CANCELLED | OUTPATIENT
Start: 2022-01-12

## 2022-01-11 RX ORDER — SODIUM CHLORIDE 0.9 % (FLUSH) 0.9 %
10 SYRINGE (ML) INJECTION
Status: CANCELLED | OUTPATIENT
Start: 2022-01-13

## 2022-01-11 RX ORDER — HEPARIN 100 UNIT/ML
500 SYRINGE INTRAVENOUS
Status: CANCELLED | OUTPATIENT
Start: 2022-01-11

## 2022-01-11 NOTE — LETTER
January 11, 2022        Mirian Robertson MD  [address]  [facility] [address]             [facility]  [address]  [place] [address]  Phone: [phone]  Fax: [phone]   Patient: Diana Prather   MR Number: [id number]   YOB: 1965   Date of Visit: 1/11/2022       Dear Dr. Robertson:    Thank you for referring Diana Prather to me for evaluation. Attached you will find relevant portions of my assessment and plan of care.    If you have questions, please do not hesitate to call me. I look forward to following Diana Prather along with you.    Sincerely,      Smita Plaza NP            CC  No Recipients    Enclosure

## 2022-01-11 NOTE — PROGRESS NOTES
MEDICAL ONCOLOGY FOLLOW UP CONSULTATION NOTE    Chief Complaint: Lung mass    HPI: Patient is a 55 year old female current smoker who underwent screening CT scan chest by her PCP which showed a suspicious mass arising from the left lower lobe with bronchial obstruction highly suspicious for malignancy. She presents to our clinic today for further evaluation. Denies weight loss, activity and appetite changes. Does complain of cough which she attributes it to smoking and says it is chronic with no changes.             CT scan w/o contrast: 9/27/2021    1.  Category 4x: Very suspicious lesion. Large soft tissue mass effect in   the left lower lobe associated with bronchial obstruction highly suspicious    for malignancy.      Chest CT with or without contrast, CT/PET and/or tissue sampling depending   on the probability of malignancy and comorbidities. Consultation with   pulmonology is also recommended.         PET scan: 10/11/2021  == Large left lower lobe hypermetabolic pass consistent with malignancy.  It has a maximum            diameter on the CT of approximately 10 cm it shows intense radiotracer uptake with the SUV being 17.7      DIAGNOSIS:   11/12/2021 BOWERS/kml   LUNG, LEFT LOWER LOBE, EBUS-FNA:   -- SMALL CELL CARCINOMA.   -- SEE COMMENT.   Comment:   A panel of immunohistochemical stains are performed on block 1A to further   characterize the neoplasm.  The tumor cells are positive for pancytokeratin,   CK7, CK8/18, and CD56.  The cells of interest are negative for P40,   synaptophysin, TTF1, CD3, CD20, CK20, and S100.  The CK7 demonstrates some   dot-like pattern expression.  Controls performed as expected.  The above   findings support the diagnosis of small cell carcinoma.             Labs:  Lab Results   Component Value Date    WBC 7.5 01/10/2022    HGB 9.6 (L) 01/10/2022    HCT 28.5 (L) 01/10/2022    .8 (H) 01/10/2022    LABPLAT 153 01/10/2022      Platelets   Date Value Ref Range Status    01/10/2022 153 142 - 424 10*3/uL Final     CMP  Sodium   Date Value Ref Range Status   01/10/2022 141 135 - 145 mmol/L Final     Potassium   Date Value Ref Range Status   01/10/2022 4.1 3.6 - 5.2 mmol/L Final     Chloride   Date Value Ref Range Status   01/10/2022 103 100 - 108 mmol/L Final     CO2   Date Value Ref Range Status   01/10/2022 25 21 - 32 mmol/L Final     Glucose   Date Value Ref Range Status   01/10/2022 127 (H) 70 - 110 mg/dL Final     BUN   Date Value Ref Range Status   01/10/2022 9 7 - 18 mg/dL Final     Creatinine   Date Value Ref Range Status   01/10/2022 0.56 0.55 - 1.02 mg/dL Final     Calcium   Date Value Ref Range Status   01/10/2022 9.1 8.8 - 10.5 mg/dL Final     Total Protein   Date Value Ref Range Status   01/10/2022 6.9 6.4 - 8.2 g/dL Final     Albumin   Date Value Ref Range Status   01/10/2022 3.4 3.4 - 5.0 g/dL Final     Total Bilirubin   Date Value Ref Range Status   01/10/2022 0.3 0.0 - 1.0 mg/dL Final     Alkaline Phosphatase   Date Value Ref Range Status   01/10/2022 99 46 - 116 U/L Final     AST   Date Value Ref Range Status   01/10/2022 25 15 - 37 U/L Final     ALT   Date Value Ref Range Status   01/10/2022 45 12 - 78 U/L Final     Anion Gap   Date Value Ref Range Status   01/10/2022 13.0 (H) 3.0 - 11.0 mmol/L Final     eGFR if    Date Value Ref Range Status   09/17/2021 124 > OR = 60 mL/min/1.73m2 Final     eGFR if non    Date Value Ref Range Status   09/17/2021 107 > OR = 60 mL/min/1.73m2 Final            Past Medical History:   Diagnosis Date    Allergy     GERD (gastroesophageal reflux disease)     Hiatal hernia     Small cell lung cancer 11/15/2021     Family History   Problem Relation Age of Onset    Emphysema Mother     Diabetes Mother     Diabetes Father     Alcohol abuse Father     No Known Problems Sister     No Known Problems Brother     No Known Problems Maternal Aunt     No Known Problems Maternal Uncle     No Known  Problems Paternal Aunt     No Known Problems Paternal Uncle     No Known Problems Maternal Grandmother     No Known Problems Maternal Grandfather     No Known Problems Paternal Grandmother     No Known Problems Paternal Grandfather     No Known Problems Daughter      Social History     Socioeconomic History    Marital status:    Tobacco Use    Smoking status: Current Every Day Smoker     Packs/day: 0.50     Years: 40.00     Pack years: 20.00     Types: Cigarettes    Smokeless tobacco: Never Used   Substance and Sexual Activity    Alcohol use: Yes     Alcohol/week: 10.0 standard drinks     Types: 10 Cans of beer per week    Drug use: Never    Sexual activity: Yes     Partners: Male     Birth control/protection: None     Past Surgical History:   Procedure Laterality Date    COLONOSCOPY       in [place] and he did the dialation     ESOPHAGEAL DILATION      SPINAL FUSION      neck         Review of systems:  Review of Systems   Constitutional: Negative for activity change, appetite change, chills, diaphoresis, fatigue and unexpected weight change.   HENT: Negative for congestion, facial swelling, hearing loss, mouth sores, trouble swallowing and voice change.    Eyes: Negative for photophobia, pain, discharge and itching.   Respiratory: Positive for cough. Negative for apnea, choking, chest tightness and shortness of breath.    Cardiovascular: Negative for chest pain, palpitations and leg swelling.   Gastrointestinal: Negative for abdominal distention, abdominal pain, anal bleeding and blood in stool.   Endocrine: Negative for cold intolerance, heat intolerance, polydipsia and polyphagia.   Genitourinary: Negative for difficulty urinating, dysuria, flank pain and hematuria.   Musculoskeletal: Positive for arthralgias. Negative for back pain, joint swelling, myalgias, neck pain and neck stiffness.        +ve for joint pain   Skin: Negative for color change, pallor and wound.    Allergic/Immunologic: Negative for environmental allergies, food allergies and immunocompromised state.   Neurological: Negative for dizziness, seizures, facial asymmetry, speech difficulty, light-headedness, numbness and headaches.   Hematological: Negative for adenopathy. Does not bruise/bleed easily.   Psychiatric/Behavioral: Negative for agitation, behavioral problems, confusion, decreased concentration and sleep disturbance.       Physical Exam  Vitals and nursing note reviewed.   Constitutional:       General: She is not in acute distress.     Appearance: Normal appearance. She is not ill-appearing.   HENT:      Head: Normocephalic and atraumatic.      Nose: No congestion or rhinorrhea.   Eyes:      General: No scleral icterus.     Extraocular Movements: Extraocular movements intact.      Pupils: Pupils are equal, round, and reactive to light.   Cardiovascular:      Rate and Rhythm: Normal rate and regular rhythm.      Pulses: Normal pulses.      Heart sounds: Normal heart sounds. No murmur heard.  No gallop.    Pulmonary:      Effort: Pulmonary effort is normal. No respiratory distress.      Breath sounds: Normal breath sounds. No stridor. No wheezing or rhonchi.      Comments: +ve for coarse breath sounds  Abdominal:      General: Bowel sounds are normal. There is no distension.      Palpations: There is no mass.      Tenderness: There is no abdominal tenderness. There is no guarding.   Musculoskeletal:         General: No swelling, tenderness, deformity or signs of injury. Normal range of motion.      Cervical back: Normal range of motion and neck supple. No rigidity. No muscular tenderness.      Right lower leg: No edema.      Left lower leg: No edema.   Skin:     General: Skin is warm.      Coloration: Skin is not jaundiced or pale.      Findings: No bruising or lesion.   Neurological:      General: No focal deficit present.      Mental Status: She is alert and oriented to person, place, and time.       Cranial Nerves: No cranial nerve deficit.      Sensory: No sensory deficit.      Motor: No weakness.      Gait: Gait normal.   Psychiatric:         Mood and Affect: Mood normal.         Behavior: Behavior normal.         Thought Content: Thought content normal.       Vitals:    01/11/22 0918   BP: 127/83   Pulse: 90   Resp: 16   Temp: 96.9 °F (36.1 °C)   Body surface area is 1.7 meters squared.          Assessment/Plan:      ECOG 1    1. Limited Stage Small cell carcinoma: [id number], Stage IIIA    == Diagnosis via Left lower lobe EBUS: FNA.  == I do not feel resectability would be an option which is the preferred option per NCCN guidelines., looking at the size and location. No evidence of metastatic disease on imaging. However, on the last chest X-ray  she was noted to have suspected left sided effusion, this did not correlate with PET scan but obviously concerning.  She would like to seek a second opinion at [facility] and  I discussed this with her that while she does this, she should not delay start of her treatment considering the size of the mass. Our plan should be for concurrent chemoradiation. Referral for port placement placed. Patient has been discussed in [facility] before and it was decided to get EBUS FNA to get the diagnosis and to start treatment as soon as possible  11/23/21:  Patient in clinic today to discuss chemotherapy side effects, risks versus benefits, and expected outcomes.  Consents have been signed and all questions answered and medications have been sent to pharmacy.  Patient will tentatively begin chemotherapy next week once radiation is ready to begin.  Labs to be drawn today prior to beginning treatment next week.   12/21/21: patient states tolerated cycle 1 chemo well with only occasional nausea. Well controlled with phenergan. Labs reviewed and pt cleared for cycle 2.   1/11/22:  Patient continues to tolerate chemotherapy very well.  She denies any nausea vomiting diarrhea or malaise.  She  states she completes radiation on Thursday.  Labs reviewed and patient is cleared for cycle 3 chemotherapy is planned for today 3 Thursday.  She is requesting referral to smoking cessation which was placed today.     2. Current Smoker:    == Smoking cessation counseling done. She will try to cut down      RTC in 3 weeks for MD visit prior to cycle 4      Total time spent in counseling and discussion about further management options including relevant lab work, treatment,  prognosis, medications and intended side effects was more than 60 minutes. More than 50% of the time was spent on counseling and coordination of care.  I spent a total of 60 minutes on the day of the visit.This includes face to face time and non-face to face time preparing to see the patient (eg, review of tests), Obtaining and/or reviewing separately obtained history, Documenting clinical information in the electronic or other health record, Independently interpreting resultsand communicating results to the patient/family/caregiver, or Care coordination.

## 2022-01-13 ENCOUNTER — OFFICE VISIT (OUTPATIENT)
Dept: OBSTETRICS AND GYNECOLOGY | Facility: CLINIC | Age: 57
End: 2022-01-13
Payer: MEDICAID

## 2022-01-13 VITALS
HEIGHT: 66 IN | BODY MASS INDEX: 21.92 KG/M2 | DIASTOLIC BLOOD PRESSURE: 63 MMHG | HEART RATE: 90 BPM | SYSTOLIC BLOOD PRESSURE: 101 MMHG | WEIGHT: 136.38 LBS

## 2022-01-13 DIAGNOSIS — L30.9 VULVAR DERMATITIS: ICD-10-CM

## 2022-01-13 PROCEDURE — 3074F PR MOST RECENT SYSTOLIC BLOOD PRESSURE < 130 MM HG: ICD-10-PCS | Mod: CPTII,S$GLB,, | Performed by: NURSE PRACTITIONER

## 2022-01-13 PROCEDURE — 3008F PR BODY MASS INDEX (BMI) DOCUMENTED: ICD-10-PCS | Mod: CPTII,S$GLB,, | Performed by: NURSE PRACTITIONER

## 2022-01-13 PROCEDURE — 99213 OFFICE O/P EST LOW 20 MIN: CPT | Mod: S$GLB,,, | Performed by: NURSE PRACTITIONER

## 2022-01-13 PROCEDURE — 1159F MED LIST DOCD IN RCRD: CPT | Mod: CPTII,S$GLB,, | Performed by: NURSE PRACTITIONER

## 2022-01-13 PROCEDURE — 1159F PR MEDICATION LIST DOCUMENTED IN MEDICAL RECORD: ICD-10-PCS | Mod: CPTII,S$GLB,, | Performed by: NURSE PRACTITIONER

## 2022-01-13 PROCEDURE — 3078F DIAST BP <80 MM HG: CPT | Mod: CPTII,S$GLB,, | Performed by: NURSE PRACTITIONER

## 2022-01-13 PROCEDURE — 3078F PR MOST RECENT DIASTOLIC BLOOD PRESSURE < 80 MM HG: ICD-10-PCS | Mod: CPTII,S$GLB,, | Performed by: NURSE PRACTITIONER

## 2022-01-13 PROCEDURE — 3008F BODY MASS INDEX DOCD: CPT | Mod: CPTII,S$GLB,, | Performed by: NURSE PRACTITIONER

## 2022-01-13 PROCEDURE — 99213 PR OFFICE/OUTPT VISIT, EST, LEVL III, 20-29 MIN: ICD-10-PCS | Mod: S$GLB,,, | Performed by: NURSE PRACTITIONER

## 2022-01-13 PROCEDURE — 3074F SYST BP LT 130 MM HG: CPT | Mod: CPTII,S$GLB,, | Performed by: NURSE PRACTITIONER

## 2022-01-13 NOTE — PROGRESS NOTES
"  Subjective:       Patient ID: Diana Prather is a 56 y.o. female.    Chief Complaint:  Follow-up      History of Present Illness   Presents for f/u on vulvar dermatitis. States it has greatly improve with clobetasol. Has not done MMG r/t radiation and chemotherapy.   No LMP recorded (lmp unknown). Patient is postmenopausal.  Pap negative with negative HPV    OB History        3    Para   1    Term   1            AB   2    Living   1       SAB        IAB   2    Ectopic        Multiple        Live Births   1                  Review of Systems  Review of Systems   Constitutional: Negative for chills and fever.   Respiratory: Positive for shortness of breath.    Cardiovascular: Negative for chest pain.   Gastrointestinal: Negative for abdominal pain, blood in stool, constipation, diarrhea, nausea, vomiting and reflux.   Genitourinary: Negative for dysmenorrhea, dyspareunia, dysuria, hematuria, hot flashes, menorrhagia, menstrual problem, pelvic pain, vaginal bleeding, vaginal discharge, postcoital bleeding and vaginal dryness.   Musculoskeletal: Negative for arthralgias and joint swelling.   Integumentary:  Negative for rash, hair changes, breast mass, nipple discharge and breast skin changes.   Psychiatric/Behavioral: Negative for depression. The patient is not nervous/anxious.    Breast: Negative for asymmetry, lump, mass, nipple discharge and skin changes          Objective:     Vitals:    22 1427   BP: 101/63   Pulse: (!) 147   Weight: 61.9 kg (136 lb 6 oz)   Height: 5' 6" (1.676 m)        Physical Exam:   Constitutional: She is oriented to person, place, and time. She appears well-developed and well-nourished.    HENT:   Head: Normocephalic and atraumatic.    Eyes: Pupils are equal, round, and reactive to light. Conjunctivae are normal.      Pulmonary/Chest: Effort normal.        Abdominal: Soft.             Musculoskeletal: Moves all extremeties.       Neurological: She is alert and oriented to " person, place, and time.    Skin: Skin is warm and dry.    Psychiatric: She has a normal mood and affect. Her behavior is normal. Judgment and thought content normal.          Assessment:     1. Vulvar dermatitis              Plan:       Vulvar dermatitis  -     Ambulatory referral/consult to Gynecology       Continue current therapy  encouraged mmg  Smoking cessation  Follow up in about 1 year (around 1/13/2023).

## 2022-01-19 ENCOUNTER — TELEPHONE (OUTPATIENT)
Dept: HEMATOLOGY/ONCOLOGY | Facility: CLINIC | Age: 57
End: 2022-01-19
Payer: MEDICAID

## 2022-01-20 ENCOUNTER — TELEPHONE (OUTPATIENT)
Dept: HEMATOLOGY/ONCOLOGY | Facility: CLINIC | Age: 57
End: 2022-01-20
Payer: MEDICAID

## 2022-01-20 NOTE — TELEPHONE ENCOUNTER
Spoke with patient who states she has an at home BP machine she will keep a log of her BP. I also told her to call us for an appt if she is not feeling any better. Patient states understanding. TTRN

## 2022-01-20 NOTE — TELEPHONE ENCOUNTER
"Spoke with the patient who states that she is having some depression as well as lightheadedness,some weakness and shortness of breath, also states "her legs feel like jello". States she is sleeping fine, and denies any fever, vomiting, diarrhea, or bleeding. States she has occasional constipation but it resoles with over the counter medications. States the symptoms started after her first treatment. Offered to move appointment but patient declines. Message sent to provider. TTRN  "

## 2022-02-01 ENCOUNTER — OFFICE VISIT (OUTPATIENT)
Dept: HEMATOLOGY/ONCOLOGY | Facility: CLINIC | Age: 57
End: 2022-02-01
Payer: MEDICAID

## 2022-02-01 VITALS
OXYGEN SATURATION: 97 % | SYSTOLIC BLOOD PRESSURE: 150 MMHG | WEIGHT: 136.31 LBS | BODY MASS INDEX: 21.91 KG/M2 | RESPIRATION RATE: 16 BRPM | DIASTOLIC BLOOD PRESSURE: 83 MMHG | HEART RATE: 90 BPM | TEMPERATURE: 98 F | HEIGHT: 66 IN

## 2022-02-01 DIAGNOSIS — C34.90 SMALL CELL LUNG CANCER: Primary | ICD-10-CM

## 2022-02-01 DIAGNOSIS — D63.0 ANEMIA IN NEOPLASTIC DISEASE: ICD-10-CM

## 2022-02-01 DIAGNOSIS — Z51.11 ENCOUNTER FOR ANTINEOPLASTIC CHEMOTHERAPY: ICD-10-CM

## 2022-02-01 LAB
ALBUMIN SERPL BCP-MCNC: 3.4 G/DL (ref 3.4–5)
ALBUMIN/GLOBULIN RATIO: 1 RATIO (ref 1.1–1.8)
ALP SERPL-CCNC: 114 U/L (ref 46–116)
ALT SERPL W P-5'-P-CCNC: 38 U/L (ref 12–78)
ANION GAP SERPL CALC-SCNC: 9 MMOL/L (ref 3–11)
ANISOCYTOSIS: ABNORMAL
AST SERPL-CCNC: 29 U/L (ref 15–37)
BANDS: 1 % (ref 0–5)
BILIRUB SERPL-MCNC: 0.3 MG/DL (ref 0–1)
BUN SERPL-MCNC: 8 MG/DL (ref 7–18)
BUN/CREAT SERPL: 12.3 RATIO (ref 7–18)
CALCIUM SERPL-MCNC: 8.7 MG/DL (ref 8.8–10.5)
CELLS COUNTED: 100
CHLORIDE SERPL-SCNC: 103 MMOL/L (ref 100–108)
CO2 SERPL-SCNC: 27 MMOL/L (ref 21–32)
CREAT SERPL-MCNC: 0.65 MG/DL (ref 0.55–1.02)
ERYTHROCYTE [DISTWIDTH] IN BLOOD BY AUTOMATED COUNT: 22.9 % (ref 12.5–18)
GFR ESTIMATION: > 60
GLOBULIN: 3.4 G/DL (ref 2.3–3.5)
GLUCOSE SERPL-MCNC: 122 MG/DL (ref 70–110)
HCT VFR BLD AUTO: 22.8 % (ref 37–47)
HGB BLD-MCNC: 7.5 G/DL (ref 12–16)
LYMPHOCYTES NFR BLD: 9 % (ref 25–40)
MACROCYTES BLD QL SMEAR: ABNORMAL
MCH RBC QN AUTO: 36.2 PG (ref 27–31.2)
MCHC RBC AUTO-ENTMCNC: 32.9 G/DL (ref 31.8–35.4)
MCV RBC AUTO: 110.1 FL (ref 80–97)
METAMYELOCYTES # BLD MANUAL: 2 % (ref 0–0)
MONOCYTES NFR BLD: 12 % (ref 1–15)
NEUTROPHILS # BLD AUTO: 5 10*3/UL (ref 1.8–7.7)
NEUTROPHILS NFR BLD: 76 % (ref 37–80)
NUCLEATED RED BLOOD CELLS: 0.8 %
PLATELETS: 274 10*3/UL (ref 142–424)
POTASSIUM SERPL-SCNC: 4.5 MMOL/L (ref 3.6–5.2)
PROT SERPL-MCNC: 6.8 G/DL (ref 6.4–8.2)
RBC # BLD AUTO: 2.07 10*6/UL (ref 4.2–5.4)
SMALL PLATELETS BLD QL SMEAR: ADEQUATE
SODIUM BLD-SCNC: 139 MMOL/L (ref 135–145)
WBC # BLD: 6.5 10*3/UL (ref 4.6–10.2)

## 2022-02-01 PROCEDURE — 99214 PR OFFICE/OUTPT VISIT, EST, LEVL IV, 30-39 MIN: ICD-10-PCS | Mod: S$GLB,,, | Performed by: NURSE PRACTITIONER

## 2022-02-01 PROCEDURE — 99214 OFFICE O/P EST MOD 30 MIN: CPT | Mod: S$GLB,,, | Performed by: NURSE PRACTITIONER

## 2022-02-01 PROCEDURE — 1160F RVW MEDS BY RX/DR IN RCRD: CPT | Mod: CPTII,S$GLB,, | Performed by: NURSE PRACTITIONER

## 2022-02-01 PROCEDURE — 1159F MED LIST DOCD IN RCRD: CPT | Mod: CPTII,S$GLB,, | Performed by: NURSE PRACTITIONER

## 2022-02-01 PROCEDURE — 1160F PR REVIEW ALL MEDS BY PRESCRIBER/CLIN PHARMACIST DOCUMENTED: ICD-10-PCS | Mod: CPTII,S$GLB,, | Performed by: NURSE PRACTITIONER

## 2022-02-01 PROCEDURE — 3077F SYST BP >= 140 MM HG: CPT | Mod: CPTII,S$GLB,, | Performed by: NURSE PRACTITIONER

## 2022-02-01 PROCEDURE — 3077F PR MOST RECENT SYSTOLIC BLOOD PRESSURE >= 140 MM HG: ICD-10-PCS | Mod: CPTII,S$GLB,, | Performed by: NURSE PRACTITIONER

## 2022-02-01 PROCEDURE — 3079F PR MOST RECENT DIASTOLIC BLOOD PRESSURE 80-89 MM HG: ICD-10-PCS | Mod: CPTII,S$GLB,, | Performed by: NURSE PRACTITIONER

## 2022-02-01 PROCEDURE — 1159F PR MEDICATION LIST DOCUMENTED IN MEDICAL RECORD: ICD-10-PCS | Mod: CPTII,S$GLB,, | Performed by: NURSE PRACTITIONER

## 2022-02-01 PROCEDURE — 3079F DIAST BP 80-89 MM HG: CPT | Mod: CPTII,S$GLB,, | Performed by: NURSE PRACTITIONER

## 2022-02-01 PROCEDURE — 3008F BODY MASS INDEX DOCD: CPT | Mod: CPTII,S$GLB,, | Performed by: NURSE PRACTITIONER

## 2022-02-01 PROCEDURE — 3008F PR BODY MASS INDEX (BMI) DOCUMENTED: ICD-10-PCS | Mod: CPTII,S$GLB,, | Performed by: NURSE PRACTITIONER

## 2022-02-01 RX ORDER — LIDOCAINE HYDROCHLORIDE 20 MG/ML
SOLUTION ORAL; TOPICAL
COMMUNITY
Start: 2021-12-23

## 2022-02-01 RX ORDER — SODIUM CHLORIDE 0.9 % (FLUSH) 0.9 %
10 SYRINGE (ML) INJECTION
Status: CANCELLED | OUTPATIENT
Start: 2022-02-01

## 2022-02-01 RX ORDER — SODIUM CHLORIDE 0.9 % (FLUSH) 0.9 %
10 SYRINGE (ML) INJECTION
Status: CANCELLED | OUTPATIENT
Start: 2022-02-03

## 2022-02-01 RX ORDER — SODIUM CHLORIDE 0.9 % (FLUSH) 0.9 %
10 SYRINGE (ML) INJECTION
Status: CANCELLED | OUTPATIENT
Start: 2022-02-02

## 2022-02-01 RX ORDER — HEPARIN 100 UNIT/ML
500 SYRINGE INTRAVENOUS
Status: CANCELLED | OUTPATIENT
Start: 2022-02-02

## 2022-02-01 RX ORDER — HEPARIN 100 UNIT/ML
500 SYRINGE INTRAVENOUS
Status: CANCELLED | OUTPATIENT
Start: 2022-02-01

## 2022-02-01 RX ORDER — METHYLPREDNISOLONE 4 MG/1
TABLET ORAL
COMMUNITY
Start: 2021-12-30 | End: 2022-02-10

## 2022-02-01 RX ORDER — HEPARIN 100 UNIT/ML
500 SYRINGE INTRAVENOUS
Status: CANCELLED | OUTPATIENT
Start: 2022-02-03

## 2022-02-01 NOTE — LETTER
February 1, 2022        Mirian Robertson MD  [address]  [facility] [address]             [facility]  [address]  [place] [address]  Phone: [phone]  Fax: [phone]   Patient: Diana Prather   MR Number: [id number]   YOB: 1965   Date of Visit: 2/1/2022       Dear Dr. Robertson:    Thank you for referring Diana Prather to me for evaluation. Attached you will find relevant portions of my assessment and plan of care.    If you have questions, please do not hesitate to call me. I look forward to following Diana Prather along with you.    Sincerely,      Smita Plaza NP            CC  No Recipients    Enclosure

## 2022-02-01 NOTE — PROGRESS NOTES
MEDICAL ONCOLOGY FOLLOW UP CONSULTATION NOTE    Chief Complaint: Lung mass    HPI: Patient is a 55 year old female current smoker who underwent screening CT scan chest by her PCP which showed a suspicious mass arising from the left lower lobe with bronchial obstruction highly suspicious for malignancy. She presents to our clinic today for further evaluation. Denies weight loss, activity and appetite changes. Does complain of cough which she attributes it to smoking and says it is chronic with no changes.             CT scan w/o contrast: 9/27/2021    1.  Category 4x: Very suspicious lesion. Large soft tissue mass effect in   the left lower lobe associated with bronchial obstruction highly suspicious    for malignancy.      Chest CT with or without contrast, CT/PET and/or tissue sampling depending   on the probability of malignancy and comorbidities. Consultation with   pulmonology is also recommended.         PET scan: 10/11/2021  == Large left lower lobe hypermetabolic pass consistent with malignancy.  It has a maximum            diameter on the CT of approximately 10 cm it shows intense radiotracer uptake with the SUV being 17.7      DIAGNOSIS:   11/12/2021 BOWERS/kml   LUNG, LEFT LOWER LOBE, EBUS-FNA:   -- SMALL CELL CARCINOMA.   -- SEE COMMENT.   Comment:   A panel of immunohistochemical stains are performed on block 1A to further   characterize the neoplasm.  The tumor cells are positive for pancytokeratin,   CK7, CK8/18, and CD56.  The cells of interest are negative for P40,   synaptophysin, TTF1, CD3, CD20, CK20, and S100.  The CK7 demonstrates some   dot-like pattern expression.  Controls performed as expected.  The above   findings support the diagnosis of small cell carcinoma.             Labs:  Lab Results   Component Value Date    WBC 6.5 02/01/2022    HGB 7.5 (L) 02/01/2022    HCT 22.8 (L) 02/01/2022    .1 (H) 02/01/2022    LABPLAT 274 02/01/2022      Platelets   Date Value Ref Range Status    02/01/2022 274 142 - 424 10*3/uL Final     CMP  Sodium   Date Value Ref Range Status   02/01/2022 139 135 - 145 mmol/L Final     Potassium   Date Value Ref Range Status   02/01/2022 4.5 3.6 - 5.2 mmol/L Final     Chloride   Date Value Ref Range Status   02/01/2022 103 100 - 108 mmol/L Final     CO2   Date Value Ref Range Status   02/01/2022 27 21 - 32 mmol/L Final     Glucose   Date Value Ref Range Status   02/01/2022 122 (H) 70 - 110 mg/dL Final     BUN   Date Value Ref Range Status   02/01/2022 8 7 - 18 mg/dL Final     Creatinine   Date Value Ref Range Status   02/01/2022 0.65 0.55 - 1.02 mg/dL Final     Calcium   Date Value Ref Range Status   02/01/2022 8.7 (L) 8.8 - 10.5 mg/dL Final     Total Protein   Date Value Ref Range Status   02/01/2022 6.8 6.4 - 8.2 g/dL Final     Albumin   Date Value Ref Range Status   02/01/2022 3.4 3.4 - 5.0 g/dL Final     Total Bilirubin   Date Value Ref Range Status   02/01/2022 0.3 0.0 - 1.0 mg/dL Final     Alkaline Phosphatase   Date Value Ref Range Status   02/01/2022 114 46 - 116 U/L Final     AST   Date Value Ref Range Status   02/01/2022 29 15 - 37 U/L Final     ALT   Date Value Ref Range Status   02/01/2022 38 12 - 78 U/L Final     Anion Gap   Date Value Ref Range Status   02/01/2022 9.0 3.0 - 11.0 mmol/L Final     eGFR if    Date Value Ref Range Status   09/17/2021 124 > OR = 60 mL/min/1.73m2 Final     eGFR if non    Date Value Ref Range Status   09/17/2021 107 > OR = 60 mL/min/1.73m2 Final            Past Medical History:   Diagnosis Date    Allergy     GERD (gastroesophageal reflux disease)     Hiatal hernia     Small cell lung cancer 11/15/2021     Family History   Problem Relation Age of Onset    Emphysema Mother     Diabetes Mother     Diabetes Father     Alcohol abuse Father     No Known Problems Sister     No Known Problems Brother     No Known Problems Maternal Aunt     No Known Problems Maternal Uncle     No Known  Problems Paternal Aunt     No Known Problems Paternal Uncle     No Known Problems Maternal Grandmother     No Known Problems Maternal Grandfather     No Known Problems Paternal Grandmother     No Known Problems Paternal Grandfather     No Known Problems Daughter      Social History     Socioeconomic History    Marital status:    Tobacco Use    Smoking status: Current Every Day Smoker     Packs/day: 0.50     Years: 40.00     Pack years: 20.00     Types: Cigarettes    Smokeless tobacco: Never Used   Substance and Sexual Activity    Alcohol use: Yes     Alcohol/week: 10.0 standard drinks     Types: 10 Cans of beer per week    Drug use: Never    Sexual activity: Yes     Partners: Male     Birth control/protection: None     Past Surgical History:   Procedure Laterality Date    COLONOSCOPY       in [place] and he did the dialation     ESOPHAGEAL DILATION      SPINAL FUSION      neck         Review of systems:  Review of Systems   Constitutional: Negative for activity change, appetite change, chills, diaphoresis, fatigue and unexpected weight change.   HENT: Negative for congestion, facial swelling, hearing loss, mouth sores, trouble swallowing and voice change.    Eyes: Negative for photophobia, pain, discharge and itching.   Respiratory: Positive for cough. Negative for apnea, choking, chest tightness and shortness of breath.    Cardiovascular: Negative for chest pain, palpitations and leg swelling.   Gastrointestinal: Negative for abdominal distention, abdominal pain, anal bleeding and blood in stool.   Endocrine: Negative for cold intolerance, heat intolerance, polydipsia and polyphagia.   Genitourinary: Negative for difficulty urinating, dysuria, flank pain and hematuria.   Musculoskeletal: Positive for arthralgias. Negative for back pain, joint swelling, myalgias, neck pain and neck stiffness.        +ve for joint pain   Skin: Negative for color change, pallor and wound.    Allergic/Immunologic: Negative for environmental allergies, food allergies and immunocompromised state.   Neurological: Negative for dizziness, seizures, facial asymmetry, speech difficulty, light-headedness, numbness and headaches.   Hematological: Negative for adenopathy. Does not bruise/bleed easily.   Psychiatric/Behavioral: Negative for agitation, behavioral problems, confusion, decreased concentration and sleep disturbance.       Physical Exam  Vitals and nursing note reviewed.   Constitutional:       General: She is not in acute distress.     Appearance: Normal appearance. She is not ill-appearing.   HENT:      Head: Normocephalic and atraumatic.      Nose: No congestion or rhinorrhea.   Eyes:      General: No scleral icterus.     Extraocular Movements: Extraocular movements intact.      Pupils: Pupils are equal, round, and reactive to light.   Cardiovascular:      Rate and Rhythm: Normal rate and regular rhythm.      Pulses: Normal pulses.      Heart sounds: Normal heart sounds. No murmur heard.  No gallop.    Pulmonary:      Effort: Pulmonary effort is normal. No respiratory distress.      Breath sounds: Normal breath sounds. No stridor. No wheezing or rhonchi.      Comments: +ve for coarse breath sounds  Abdominal:      General: Bowel sounds are normal. There is no distension.      Palpations: There is no mass.      Tenderness: There is no abdominal tenderness. There is no guarding.   Musculoskeletal:         General: No swelling, tenderness, deformity or signs of injury. Normal range of motion.      Cervical back: Normal range of motion and neck supple. No rigidity. No muscular tenderness.      Right lower leg: No edema.      Left lower leg: No edema.   Skin:     General: Skin is warm.      Coloration: Skin is not jaundiced or pale.      Findings: No bruising or lesion.   Neurological:      General: No focal deficit present.      Mental Status: She is alert and oriented to person, place, and time.       Cranial Nerves: No cranial nerve deficit.      Sensory: No sensory deficit.      Motor: No weakness.      Gait: Gait normal.   Psychiatric:         Mood and Affect: Mood normal.         Behavior: Behavior normal.         Thought Content: Thought content normal.       Vitals:    02/01/22 0930   BP: (!) 150/83   Pulse: 90   Resp: 16   Temp: 97.9 °F (36.6 °C)   Body surface area is 1.7 meters squared.          Assessment/Plan:      ECOG 1    1. Limited Stage Small cell carcinoma: [id number], Stage IIIA    == Diagnosis via Left lower lobe EBUS: FNA.  == I do not feel resectability would be an option which is the preferred option per NCCN guidelines., looking at the size and location. No evidence of metastatic disease on imaging. However, on the last chest X-ray  she was noted to have suspected left sided effusion, this did not correlate with PET scan but obviously concerning.  She would like to seek a second opinion at [facility] and  I discussed this with her that while she does this, she should not delay start of her treatment considering the size of the mass. Our plan should be for concurrent chemoradiation. Referral for port placement placed. Patient has been discussed in [facility] before and it was decided to get EBUS FNA to get the diagnosis and to start treatment as soon as possible  11/23/21:  Patient in clinic today to discuss chemotherapy side effects, risks versus benefits, and expected outcomes.  Consents have been signed and all questions answered and medications have been sent to pharmacy.  Patient will tentatively begin chemotherapy next week once radiation is ready to begin.  Labs to be drawn today prior to beginning treatment next week.   12/21/21: patient states tolerated cycle 1 chemo well with only occasional nausea. Well controlled with phenergan. Labs reviewed and pt cleared for cycle 2.   1/11/22:  Patient continues to tolerate chemotherapy very well.  She denies any nausea vomiting diarrhea or malaise.   She states she completes radiation on Thursday.  Labs reviewed and patient is cleared for cycle 3 chemotherapy is planned for today 3 Thursday.  She is requesting referral to smoking cessation which was placed today.   2/1/22:  Patient in clinic today with complaints of progressive fatigue, dyspnea noted when walking to her bathroom, and sleeping excessively.  Labs reviewed and hemoglobin 7.5 will plan to proceed with transfusion today and patient will continue with her chemo as planned.  She denies any chest pains or palpitations.  She is encouraged to reach out to clinic if her symptoms do not resolve in the next 1-2 days.  Otherwise we will see her as planned in 3 weeks prior to cycle 5.    2. Current Smoker:    == Smoking cessation counseling done. She will try to cut down      RTC in 3 weeks for MD visit prior to cycle 5      Total time spent in counseling and discussion about further management options including relevant lab work, treatment,  prognosis, medications and intended side effects was more than 25 minutes. More than 50% of the time was spent on counseling and coordination of care.  I spent a total of 60 minutes on the day of the visit.This includes face to face time and non-face to face time preparing to see the patient (eg, review of tests), Obtaining and/or reviewing separately obtained history, Documenting clinical information in the electronic or other health record, Independently interpreting resultsand communicating results to the patient/family/caregiver, or Care coordination.

## 2022-02-02 DIAGNOSIS — I10 HYPERTENSION, UNSPECIFIED TYPE: ICD-10-CM

## 2022-02-02 DIAGNOSIS — M25.559 HIP PAIN: ICD-10-CM

## 2022-02-03 RX ORDER — GABAPENTIN 300 MG/1
300 CAPSULE ORAL 2 TIMES DAILY
Qty: 60 CAPSULE | Refills: 3 | Status: SHIPPED | OUTPATIENT
Start: 2022-02-03 | End: 2022-03-02

## 2022-02-03 RX ORDER — NORTRIPTYLINE HYDROCHLORIDE 50 MG/1
50 CAPSULE ORAL NIGHTLY
Qty: 30 CAPSULE | Refills: 0 | Status: SHIPPED | OUTPATIENT
Start: 2022-02-03 | End: 2022-03-02

## 2022-02-10 ENCOUNTER — OFFICE VISIT (OUTPATIENT)
Dept: PRIMARY CARE CLINIC | Facility: CLINIC | Age: 57
End: 2022-02-10
Payer: MEDICAID

## 2022-02-10 VITALS
DIASTOLIC BLOOD PRESSURE: 83 MMHG | HEIGHT: 66 IN | BODY MASS INDEX: 21.69 KG/M2 | WEIGHT: 135 LBS | OXYGEN SATURATION: 99 % | HEART RATE: 103 BPM | SYSTOLIC BLOOD PRESSURE: 165 MMHG

## 2022-02-10 DIAGNOSIS — D63.0 ANEMIA IN NEOPLASTIC DISEASE: Primary | ICD-10-CM

## 2022-02-10 DIAGNOSIS — D64.9 ANEMIA, UNSPECIFIED TYPE: Primary | ICD-10-CM

## 2022-02-10 LAB
% SATURATION: 48 % (ref 20–50)
ANISOCYTOSIS: ABNORMAL
CELLS COUNTED: 100
ERYTHROCYTE [DISTWIDTH] IN BLOOD BY AUTOMATED COUNT: 20.7 % (ref 12.5–18)
FERRITIN SERPL-MCNC: 1897 NG/ML (ref 8–388)
FOLATE: 31.3 NG/ML (ref 3.1–17.5)
HCT VFR BLD AUTO: 25 % (ref 37–47)
HGB BLD-MCNC: 8.5 G/DL (ref 12–16)
IRON: 158 UG/DL (ref 26–170)
LYMPHOCYTES NFR BLD: 32 % (ref 25–40)
MACROCYTES BLD QL SMEAR: ABNORMAL
MCH RBC QN AUTO: 34.7 PG (ref 27–31.2)
MCHC RBC AUTO-ENTMCNC: 34 G/DL (ref 31.8–35.4)
MCV RBC AUTO: 102 FL (ref 80–97)
MONOCYTES NFR BLD: 9 % (ref 1–15)
NEUTROPHILS # BLD AUTO: 0.8 10*3/UL (ref 1.8–7.7)
NEUTROPHILS NFR BLD: 59 % (ref 37–80)
NUCLEATED RED BLOOD CELLS: 0 %
PLATELETS: 174 10*3/UL (ref 142–424)
RBC # BLD AUTO: 2.45 10*6/UL (ref 4.2–5.4)
TOTAL IRON BINDING CAPACITY: 330 UG/DL (ref 250–450)
VITAMIN B12: 574 PG/ML (ref 193–986)
WBC # BLD: 1.4 10*3/UL (ref 4.6–10.2)

## 2022-02-10 PROCEDURE — 3008F PR BODY MASS INDEX (BMI) DOCUMENTED: ICD-10-PCS | Mod: CPTII,S$GLB,, | Performed by: INTERNAL MEDICINE

## 2022-02-10 PROCEDURE — 1159F MED LIST DOCD IN RCRD: CPT | Mod: CPTII,S$GLB,, | Performed by: INTERNAL MEDICINE

## 2022-02-10 PROCEDURE — 3077F PR MOST RECENT SYSTOLIC BLOOD PRESSURE >= 140 MM HG: ICD-10-PCS | Mod: CPTII,S$GLB,, | Performed by: INTERNAL MEDICINE

## 2022-02-10 PROCEDURE — 99214 PR OFFICE/OUTPT VISIT, EST, LEVL IV, 30-39 MIN: ICD-10-PCS | Mod: S$GLB,,, | Performed by: INTERNAL MEDICINE

## 2022-02-10 PROCEDURE — 3079F PR MOST RECENT DIASTOLIC BLOOD PRESSURE 80-89 MM HG: ICD-10-PCS | Mod: CPTII,S$GLB,, | Performed by: INTERNAL MEDICINE

## 2022-02-10 PROCEDURE — 3077F SYST BP >= 140 MM HG: CPT | Mod: CPTII,S$GLB,, | Performed by: INTERNAL MEDICINE

## 2022-02-10 PROCEDURE — 3079F DIAST BP 80-89 MM HG: CPT | Mod: CPTII,S$GLB,, | Performed by: INTERNAL MEDICINE

## 2022-02-10 PROCEDURE — 99214 OFFICE O/P EST MOD 30 MIN: CPT | Mod: S$GLB,,, | Performed by: INTERNAL MEDICINE

## 2022-02-10 PROCEDURE — 1159F PR MEDICATION LIST DOCUMENTED IN MEDICAL RECORD: ICD-10-PCS | Mod: CPTII,S$GLB,, | Performed by: INTERNAL MEDICINE

## 2022-02-10 PROCEDURE — 3008F BODY MASS INDEX DOCD: CPT | Mod: CPTII,S$GLB,, | Performed by: INTERNAL MEDICINE

## 2022-02-10 NOTE — PROGRESS NOTES
"Subjective:      Patient ID: Diana Prather is a 56 y.o. female.    Chief Complaint: Follow-up    HPI     Patient recently diagnosed with anemia and was administered blood. She states she still feels weak. Reports some numbness/tingling/pain in her shoulder     Review of Systems   Constitutional: Positive for malaise/fatigue. Negative for chills and fever.   Respiratory: Negative for cough, shortness of breath and wheezing.    Cardiovascular: Negative for chest pain, palpitations and leg swelling.   Gastrointestinal: Negative for abdominal pain, constipation, diarrhea, nausea and vomiting.   Genitourinary: Negative for dysuria, frequency and urgency.   Musculoskeletal: Positive for joint pain.   Neurological: Positive for tingling. Negative for dizziness and headaches.     Objective:     Physical Exam  Vitals reviewed.   Constitutional:       Appearance: She is ill-appearing.   HENT:      Head: Normocephalic.   Eyes:      Extraocular Movements: Extraocular movements intact.      Conjunctiva/sclera: Conjunctivae normal.      Pupils: Pupils are equal, round, and reactive to light.   Cardiovascular:      Rate and Rhythm: Regular rhythm. Tachycardia present.   Pulmonary:      Effort: Pulmonary effort is normal.   Abdominal:      General: Bowel sounds are normal.   Musculoskeletal:      Right lower leg: No edema.      Left lower leg: No edema.   Skin:     General: Skin is warm.      Capillary Refill: Capillary refill takes less than 2 seconds.   Neurological:      General: No focal deficit present.      Mental Status: She is alert.   Psychiatric:         Mood and Affect: Mood normal.        BP (!) 165/83 (BP Location: Left arm, Patient Position: Sitting, BP Method: Medium (Automatic))   Pulse 103   Ht 5' 6" (1.676 m)   Wt 61.2 kg (135 lb)   LMP  (LMP Unknown)   SpO2 99%   BMI 21.79 kg/m²     Assessment:       ICD-10-CM ICD-9-CM   1. Anemia, unspecified type  D64.9 285.9       Plan:     Medication List with " Changes/Refills   Current Medications    ALBUTEROL (PROVENTIL/VENTOLIN HFA) 90 MCG/ACTUATION INHALER    INHALE 2 PUFFS INTO THE LUNGS EVERY 6 HOURS AS NEEDED FOR WHEEZING, RESCUE    ALBUTEROL (PROVENTIL/VENTOLIN HFA) 90 MCG/ACTUATION INHALER    INHALE 2 PUFFS INTO THE LUNGS EVERY 6 HOURS AS NEEDED FOR WHEEZING, RESCUE    BUDESONIDE-FORMOTEROL 160-4.5 MCG (SYMBICORT) 160-4.5 MCG/ACTUATION HFAA    Inhale 2 puffs into the lungs every 12 (twelve) hours. Controller    GABAPENTIN (NEURONTIN) 300 MG CAPSULE    Take 1 capsule (300 mg total) by mouth 2 (two) times daily.    LIDOCAINE VISCOUS 2 % SOLUTION        NORTRIPTYLINE (PAMELOR) 50 MG CAPSULE    Take 1 capsule (50 mg total) by mouth nightly.    ONDANSETRON (ZOFRAN-ODT) 8 MG TBDL    Take 1 tablet (8 mg total) by mouth every 6 (six) hours as needed.    PANTOPRAZOLE (PROTONIX) 40 MG TABLET    Take 1 tablet (40 mg total) by mouth once daily.    PROMETHAZINE (PHENERGAN) 25 MG TABLET    Take 1 tablet (25 mg total) by mouth every 8 (eight) hours.   Discontinued Medications    ALBUTEROL (PROVENTIL/VENTOLIN HFA) 90 MCG/ACTUATION INHALER    Inhale 2 puffs into the lungs every 6 (six) hours as needed for Wheezing. Rescue    CLOBETASOL 0.05% (TEMOVATE) 0.05 % OINT    Apply topically 2 (two) times daily. Very small amount    METHYLPREDNISOLONE (MEDROL DOSEPACK) 4 MG TABLET    TAKE AS DIRECTED        Anemia, unspecified type  -     Vitamin B12; Future; Expected date: 02/10/2022  -     Folate; Future; Expected date: 02/10/2022  -     CBC Auto Differential; Future; Expected date: 02/10/2022  -     Iron, TIBC and Ferritin Panel; Future; Expected date: 02/10/2022    Other orders  -     Manual Differential       Future Appointments   Date Time Provider Department Center   2/21/2022  1:15 PM LAB TESTING, [facility]   2/22/2022  9:30 AM Smita Plaza NP [facility]   5/20/2022  1:00 PM Mirian Robertson MD [facility]   1/12/2023  1:00 PM Darby LEONE  [id number]         Patient sent urgently to the hospital for labwork to assess anemia

## 2022-02-11 ENCOUNTER — PATIENT MESSAGE (OUTPATIENT)
Dept: PRIMARY CARE CLINIC | Facility: CLINIC | Age: 57
End: 2022-02-11
Payer: MEDICAID

## 2022-02-18 DIAGNOSIS — D63.0 ANEMIA IN NEOPLASTIC DISEASE: Primary | ICD-10-CM

## 2022-02-21 ENCOUNTER — CLINICAL SUPPORT (OUTPATIENT)
Dept: HEMATOLOGY/ONCOLOGY | Facility: CLINIC | Age: 57
End: 2022-02-21
Payer: MEDICAID

## 2022-02-21 DIAGNOSIS — D63.0 ANEMIA IN NEOPLASTIC DISEASE: ICD-10-CM

## 2022-02-21 LAB
ALBUMIN SERPL BCP-MCNC: 3.4 G/DL (ref 3.4–5)
ALBUMIN/GLOBULIN RATIO: 1.1 RATIO (ref 1.1–1.8)
ALP SERPL-CCNC: 121 U/L (ref 46–116)
ALT SERPL W P-5'-P-CCNC: 31 U/L (ref 12–78)
ANION GAP SERPL CALC-SCNC: 11 MMOL/L (ref 3–11)
ANISOCYTOSIS: ABNORMAL
AST SERPL-CCNC: 26 U/L (ref 15–37)
BILIRUB SERPL-MCNC: 0.4 MG/DL (ref 0–1)
BUN SERPL-MCNC: 7 MG/DL (ref 7–18)
BUN/CREAT SERPL: 8.97 RATIO (ref 7–18)
CALCIUM SERPL-MCNC: 7.9 MG/DL (ref 8.8–10.5)
CELLS COUNTED: 100
CHLORIDE SERPL-SCNC: 103 MMOL/L (ref 100–108)
CO2 SERPL-SCNC: 27 MMOL/L (ref 21–32)
CREAT SERPL-MCNC: 0.78 MG/DL (ref 0.55–1.02)
EOSINOPHIL NFR BLD: 1 % (ref 1–3)
ERYTHROCYTE [DISTWIDTH] IN BLOOD BY AUTOMATED COUNT: 24.2 % (ref 12.5–18)
GFR ESTIMATION: > 60
GLOBULIN: 3.1 G/DL (ref 2.3–3.5)
GLUCOSE SERPL-MCNC: 131 MG/DL (ref 70–110)
HCT VFR BLD AUTO: 23.3 % (ref 37–47)
HGB BLD-MCNC: 7.7 G/DL (ref 12–16)
LYMPHOCYTES NFR BLD: 17 % (ref 25–40)
MACROCYTES BLD QL SMEAR: ABNORMAL
MCH RBC QN AUTO: 36.5 PG (ref 27–31.2)
MCHC RBC AUTO-ENTMCNC: 33 G/DL (ref 31.8–35.4)
MCV RBC AUTO: 110.4 FL (ref 80–97)
MONOCYTES NFR BLD: 13 % (ref 1–15)
NEUTROPHILS # BLD AUTO: 2.7 10*3/UL (ref 1.8–7.7)
NEUTROPHILS NFR BLD: 69 % (ref 37–80)
NUCLEATED RED BLOOD CELLS: 1 %
PLATELETS: 190 10*3/UL (ref 142–424)
POTASSIUM SERPL-SCNC: 3.7 MMOL/L (ref 3.6–5.2)
PROT SERPL-MCNC: 6.5 G/DL (ref 6.4–8.2)
RBC # BLD AUTO: 2.11 10*6/UL (ref 4.2–5.4)
SMALL PLATELETS BLD QL SMEAR: NORMAL
SODIUM BLD-SCNC: 141 MMOL/L (ref 135–145)
WBC # BLD: 3.9 10*3/UL (ref 4.6–10.2)

## 2022-02-22 ENCOUNTER — OFFICE VISIT (OUTPATIENT)
Dept: HEMATOLOGY/ONCOLOGY | Facility: CLINIC | Age: 57
End: 2022-02-22
Payer: MEDICAID

## 2022-02-22 VITALS
TEMPERATURE: 98 F | WEIGHT: 136.88 LBS | OXYGEN SATURATION: 95 % | RESPIRATION RATE: 18 BRPM | HEART RATE: 105 BPM | HEIGHT: 66 IN | DIASTOLIC BLOOD PRESSURE: 78 MMHG | BODY MASS INDEX: 22 KG/M2 | SYSTOLIC BLOOD PRESSURE: 134 MMHG

## 2022-02-22 DIAGNOSIS — C34.90 SMALL CELL LUNG CANCER: Primary | ICD-10-CM

## 2022-02-22 DIAGNOSIS — Z51.11 ENCOUNTER FOR ANTINEOPLASTIC CHEMOTHERAPY: ICD-10-CM

## 2022-02-22 DIAGNOSIS — D63.0 ANEMIA IN NEOPLASTIC DISEASE: ICD-10-CM

## 2022-02-22 PROCEDURE — 3075F SYST BP GE 130 - 139MM HG: CPT | Mod: CPTII,S$GLB,, | Performed by: NURSE PRACTITIONER

## 2022-02-22 PROCEDURE — 1159F PR MEDICATION LIST DOCUMENTED IN MEDICAL RECORD: ICD-10-PCS | Mod: CPTII,S$GLB,, | Performed by: NURSE PRACTITIONER

## 2022-02-22 PROCEDURE — 3075F PR MOST RECENT SYSTOLIC BLOOD PRESS GE 130-139MM HG: ICD-10-PCS | Mod: CPTII,S$GLB,, | Performed by: NURSE PRACTITIONER

## 2022-02-22 PROCEDURE — 3008F PR BODY MASS INDEX (BMI) DOCUMENTED: ICD-10-PCS | Mod: CPTII,S$GLB,, | Performed by: NURSE PRACTITIONER

## 2022-02-22 PROCEDURE — 1159F MED LIST DOCD IN RCRD: CPT | Mod: CPTII,S$GLB,, | Performed by: NURSE PRACTITIONER

## 2022-02-22 PROCEDURE — 99214 PR OFFICE/OUTPT VISIT, EST, LEVL IV, 30-39 MIN: ICD-10-PCS | Mod: S$GLB,,, | Performed by: NURSE PRACTITIONER

## 2022-02-22 PROCEDURE — 99214 OFFICE O/P EST MOD 30 MIN: CPT | Mod: S$GLB,,, | Performed by: NURSE PRACTITIONER

## 2022-02-22 PROCEDURE — 3078F PR MOST RECENT DIASTOLIC BLOOD PRESSURE < 80 MM HG: ICD-10-PCS | Mod: CPTII,S$GLB,, | Performed by: NURSE PRACTITIONER

## 2022-02-22 PROCEDURE — 3008F BODY MASS INDEX DOCD: CPT | Mod: CPTII,S$GLB,, | Performed by: NURSE PRACTITIONER

## 2022-02-22 PROCEDURE — 3078F DIAST BP <80 MM HG: CPT | Mod: CPTII,S$GLB,, | Performed by: NURSE PRACTITIONER

## 2022-02-22 RX ORDER — SODIUM CHLORIDE 0.9 % (FLUSH) 0.9 %
10 SYRINGE (ML) INJECTION
Status: CANCELLED | OUTPATIENT
Start: 2022-02-23

## 2022-02-22 RX ORDER — HEPARIN 100 UNIT/ML
500 SYRINGE INTRAVENOUS
Status: CANCELLED | OUTPATIENT
Start: 2022-02-22

## 2022-02-22 RX ORDER — HEPARIN 100 UNIT/ML
500 SYRINGE INTRAVENOUS
Status: CANCELLED | OUTPATIENT
Start: 2022-02-23

## 2022-02-22 RX ORDER — SODIUM CHLORIDE 0.9 % (FLUSH) 0.9 %
10 SYRINGE (ML) INJECTION
Status: CANCELLED | OUTPATIENT
Start: 2022-02-24

## 2022-02-22 RX ORDER — HEPARIN 100 UNIT/ML
500 SYRINGE INTRAVENOUS
Status: CANCELLED | OUTPATIENT
Start: 2022-02-24

## 2022-02-22 RX ORDER — SODIUM CHLORIDE 0.9 % (FLUSH) 0.9 %
10 SYRINGE (ML) INJECTION
Status: CANCELLED | OUTPATIENT
Start: 2022-02-22

## 2022-02-22 NOTE — PROGRESS NOTES
MEDICAL ONCOLOGY FOLLOW UP CONSULTATION NOTE    Chief Complaint: Lung mass    HPI: Patient is a 55 year old female current smoker who underwent screening CT scan chest by her PCP which showed a suspicious mass arising from the left lower lobe with bronchial obstruction highly suspicious for malignancy. She presents to our clinic today for further evaluation. Denies weight loss, activity and appetite changes. Does complain of cough which she attributes it to smoking and says it is chronic with no changes.     Ct Chest 2/10/22:  Left lower mass significantly decreased.  Measures 4.1 x 3.2 x 4.3 cm compared to pretreatment dimensions of 10 cm on CT scan September 27, 2021 and PET-CT on October 11, 2021.        CT scan w/o contrast: 9/27/2021    1.  Category 4x: Very suspicious lesion. Large soft tissue mass effect in   the left lower lobe associated with bronchial obstruction highly suspicious    for malignancy.      Chest CT with or without contrast, CT/PET and/or tissue sampling depending   on the probability of malignancy and comorbidities. Consultation with   pulmonology is also recommended.         PET scan: 10/11/2021  == Large left lower lobe hypermetabolic pass consistent with malignancy.  It has a maximum            diameter on the CT of approximately 10 cm it shows intense radiotracer uptake with the SUV being 17.7      DIAGNOSIS:   11/12/2021 BOWERS/kml   LUNG, LEFT LOWER LOBE, EBUS-FNA:   -- SMALL CELL CARCINOMA.   -- SEE COMMENT.   Comment:   A panel of immunohistochemical stains are performed on block 1A to further   characterize the neoplasm.  The tumor cells are positive for pancytokeratin,   CK7, CK8/18, and CD56.  The cells of interest are negative for P40,   synaptophysin, TTF1, CD3, CD20, CK20, and S100.  The CK7 demonstrates some   dot-like pattern expression.  Controls performed as expected.  The above   findings support the diagnosis of small cell carcinoma.             Labs:  Lab Results    Component Value Date    WBC 3.9 (L) 02/21/2022    HGB 7.7 (L) 02/21/2022    HCT 23.3 (L) 02/21/2022    .4 (H) 02/21/2022    LABPLAT 190 02/21/2022      Platelets   Date Value Ref Range Status   02/21/2022 190 142 - 424 10*3/uL Final     CMP  Sodium   Date Value Ref Range Status   02/21/2022 141 135 - 145 mmol/L Final     Potassium   Date Value Ref Range Status   02/21/2022 3.7 3.6 - 5.2 mmol/L Final     Chloride   Date Value Ref Range Status   02/21/2022 103 100 - 108 mmol/L Final     CO2   Date Value Ref Range Status   02/21/2022 27 21 - 32 mmol/L Final     Glucose   Date Value Ref Range Status   02/21/2022 131 (H) 70 - 110 mg/dL Final     BUN   Date Value Ref Range Status   02/21/2022 7 7 - 18 mg/dL Final     Creatinine   Date Value Ref Range Status   02/21/2022 0.78 0.55 - 1.02 mg/dL Final     Calcium   Date Value Ref Range Status   02/21/2022 7.9 (L) 8.8 - 10.5 mg/dL Final     Total Protein   Date Value Ref Range Status   02/21/2022 6.5 6.4 - 8.2 g/dL Final     Albumin   Date Value Ref Range Status   02/21/2022 3.4 3.4 - 5.0 g/dL Final     Total Bilirubin   Date Value Ref Range Status   02/21/2022 0.4 0.0 - 1.0 mg/dL Final     Alkaline Phosphatase   Date Value Ref Range Status   02/21/2022 121 (H) 46 - 116 U/L Final     AST   Date Value Ref Range Status   02/21/2022 26 15 - 37 U/L Final     ALT   Date Value Ref Range Status   02/21/2022 31 12 - 78 U/L Final     Anion Gap   Date Value Ref Range Status   02/21/2022 11.0 3.0 - 11.0 mmol/L Final     eGFR if    Date Value Ref Range Status   09/17/2021 124 > OR = 60 mL/min/1.73m2 Final     eGFR if non    Date Value Ref Range Status   09/17/2021 107 > OR = 60 mL/min/1.73m2 Final            Past Medical History:   Diagnosis Date    Allergy     GERD (gastroesophageal reflux disease)     Hiatal hernia     Small cell lung cancer 11/15/2021     Family History   Problem Relation Age of Onset    Emphysema Mother     Diabetes  Mother     Diabetes Father     Alcohol abuse Father     No Known Problems Sister     No Known Problems Brother     No Known Problems Maternal Aunt     No Known Problems Maternal Uncle     No Known Problems Paternal Aunt     No Known Problems Paternal Uncle     No Known Problems Maternal Grandmother     No Known Problems Maternal Grandfather     No Known Problems Paternal Grandmother     No Known Problems Paternal Grandfather     No Known Problems Daughter      Social History     Socioeconomic History    Marital status:    Tobacco Use    Smoking status: Current Every Day Smoker     Packs/day: 0.50     Years: 40.00     Pack years: 20.00     Types: Cigarettes    Smokeless tobacco: Never Used   Substance and Sexual Activity    Alcohol use: Yes     Alcohol/week: 10.0 standard drinks     Types: 10 Cans of beer per week    Drug use: Never    Sexual activity: Yes     Partners: Male     Birth control/protection: None     Past Surgical History:   Procedure Laterality Date    COLONOSCOPY       in [place] and he did the dialation     ESOPHAGEAL DILATION      SPINAL FUSION      neck         Review of systems:  Review of Systems   Constitutional: Negative for activity change, appetite change, chills, diaphoresis, fatigue and unexpected weight change.   HENT: Negative for congestion, facial swelling, hearing loss, mouth sores, trouble swallowing and voice change.    Eyes: Negative for photophobia, pain, discharge and itching.   Respiratory: Positive for cough. Negative for apnea, choking, chest tightness and shortness of breath.    Cardiovascular: Negative for chest pain, palpitations and leg swelling.   Gastrointestinal: Negative for abdominal distention, abdominal pain, anal bleeding and blood in stool.   Endocrine: Negative for cold intolerance, heat intolerance, polydipsia and polyphagia.   Genitourinary: Negative for difficulty urinating, dysuria, flank pain and hematuria.    Musculoskeletal: Positive for arthralgias. Negative for back pain, joint swelling, myalgias, neck pain and neck stiffness.        +ve for joint pain   Skin: Negative for color change, pallor and wound.   Allergic/Immunologic: Negative for environmental allergies, food allergies and immunocompromised state.   Neurological: Negative for dizziness, seizures, facial asymmetry, speech difficulty, light-headedness, numbness and headaches.   Hematological: Negative for adenopathy. Does not bruise/bleed easily.   Psychiatric/Behavioral: Negative for agitation, behavioral problems, confusion, decreased concentration and sleep disturbance.       Physical Exam  Vitals and nursing note reviewed.   Constitutional:       General: She is not in acute distress.     Appearance: Normal appearance. She is not ill-appearing.   HENT:      Head: Normocephalic and atraumatic.      Nose: No congestion or rhinorrhea.   Eyes:      General: No scleral icterus.     Extraocular Movements: Extraocular movements intact.      Pupils: Pupils are equal, round, and reactive to light.   Cardiovascular:      Rate and Rhythm: Normal rate and regular rhythm.      Pulses: Normal pulses.      Heart sounds: Normal heart sounds. No murmur heard.    No gallop.   Pulmonary:      Effort: Pulmonary effort is normal. No respiratory distress.      Breath sounds: Normal breath sounds. No stridor. No wheezing or rhonchi.      Comments: +ve for coarse breath sounds  Abdominal:      General: Bowel sounds are normal. There is no distension.      Palpations: There is no mass.      Tenderness: There is no abdominal tenderness. There is no guarding.   Musculoskeletal:         General: No swelling, tenderness, deformity or signs of injury. Normal range of motion.      Cervical back: Normal range of motion and neck supple. No rigidity. No muscular tenderness.      Right lower leg: No edema.      Left lower leg: No edema.   Skin:     General: Skin is warm.      Coloration:  Skin is not jaundiced or pale.      Findings: No bruising or lesion.   Neurological:      General: No focal deficit present.      Mental Status: She is alert and oriented to person, place, and time.      Cranial Nerves: No cranial nerve deficit.      Sensory: No sensory deficit.      Motor: No weakness.      Gait: Gait normal.   Psychiatric:         Mood and Affect: Mood normal.         Behavior: Behavior normal.         Thought Content: Thought content normal.       Vitals:    02/22/22 0937   BP: 134/78   Pulse: 105   Resp: 18   Temp: 97.8 °F (36.6 °C)   Body surface area is 1.7 meters squared.          Assessment/Plan:      ECOG 1    1. Limited Stage Small cell carcinoma: [id number], Stage IIIA    == Diagnosis via Left lower lobe EBUS: FNA.  == I do not feel resectability would be an option which is the preferred option per NCCN guidelines., looking at the size and location. No evidence of metastatic disease on imaging. However, on the last chest X-ray  she was noted to have suspected left sided effusion, this did not correlate with PET scan but obviously concerning.  She would like to seek a second opinion at [facility] and  I discussed this with her that while she does this, she should not delay start of her treatment considering the size of the mass. Our plan should be for concurrent chemoradiation. Referral for port placement placed. Patient has been discussed in [facility] before and it was decided to get EBUS FNA to get the diagnosis and to start treatment as soon as possible  11/23/21:  Patient in clinic today to discuss chemotherapy side effects, risks versus benefits, and expected outcomes.  Consents have been signed and all questions answered and medications have been sent to pharmacy.  Patient will tentatively begin chemotherapy next week once radiation is ready to begin.  Labs to be drawn today prior to beginning treatment next week.   12/21/21: patient states tolerated cycle 1 chemo well with only occasional  nausea. Well controlled with phenergan. Labs reviewed and pt cleared for cycle 2.   1/11/22:  Patient continues to tolerate chemotherapy very well.  She denies any nausea vomiting diarrhea or malaise.  She states she completes radiation on Thursday.  Labs reviewed and patient is cleared for cycle 3 chemotherapy is planned for today 3 Thursday.  She is requesting referral to smoking cessation which was placed today.   2/1/22:  Patient in clinic today with complaints of progressive fatigue, dyspnea noted when walking to her bathroom, and sleeping excessively.  Labs reviewed and hemoglobin 7.5 will plan to proceed with transfusion today and patient will continue with her chemo as planned.  She denies any chest pains or palpitations.  She is encouraged to reach out to clinic if her symptoms do not resolve in the next 1-2 days.  Otherwise we will see her as planned in 3 weeks prior to cycle 5.  2/22/22:  Patient continues to tolerate chemotherapy very well, she denies any chest palpitations shortness of breath at rest or excessive fatigue today.  Patient had recent CT chest for Dr. Lopez showing dramatic improvement in lung mass now 4.1 cm compared to 10 cm pretreatment.  Patient is to proceed with cycle 5 chemotherapy as planned for today.  She is to have an upcoming brain MRI and evaluation for prophylactic cranial irradiation with Dr. Lopez and after she completes chemotherapy.  Patient is aware that after completion of chemotherapy she will transition to immunotherapy for 1 year.    2. Current Smoker:    == Smoking cessation counseling done. She will try to cut down      RTC in 3 weeks for MD visit prior to cycle 6      Total time spent in counseling and discussion about further management options including relevant lab work, treatment,  prognosis, medications and intended side effects was more than 25 minutes. More than 50% of the time was spent on counseling and coordination of care.  I spent a total of 60 minutes  on the day of the visit.This includes face to face time and non-face to face time preparing to see the patient (eg, review of tests), Obtaining and/or reviewing separately obtained history, Documenting clinical information in the electronic or other health record, Independently interpreting resultsand communicating results to the patient/family/caregiver, or Care coordination.

## 2022-02-24 ENCOUNTER — OFFICE VISIT (OUTPATIENT)
Dept: HEMATOLOGY/ONCOLOGY | Facility: CLINIC | Age: 57
End: 2022-02-24
Payer: MEDICAID

## 2022-02-24 VITALS
RESPIRATION RATE: 18 BRPM | HEART RATE: 97 BPM | DIASTOLIC BLOOD PRESSURE: 82 MMHG | TEMPERATURE: 97 F | SYSTOLIC BLOOD PRESSURE: 164 MMHG | WEIGHT: 138.81 LBS | OXYGEN SATURATION: 96 % | BODY MASS INDEX: 22.31 KG/M2 | HEIGHT: 66 IN

## 2022-02-24 DIAGNOSIS — C34.90 SMALL CELL LUNG CANCER: ICD-10-CM

## 2022-02-24 DIAGNOSIS — Z91.89 AT RISK FOR ALLERGIC REACTION TO MEDICATION: Primary | ICD-10-CM

## 2022-02-24 PROCEDURE — 3008F BODY MASS INDEX DOCD: CPT | Mod: CPTII,S$GLB,, | Performed by: NURSE PRACTITIONER

## 2022-02-24 PROCEDURE — 3079F DIAST BP 80-89 MM HG: CPT | Mod: CPTII,S$GLB,, | Performed by: NURSE PRACTITIONER

## 2022-02-24 PROCEDURE — 3079F PR MOST RECENT DIASTOLIC BLOOD PRESSURE 80-89 MM HG: ICD-10-PCS | Mod: CPTII,S$GLB,, | Performed by: NURSE PRACTITIONER

## 2022-02-24 PROCEDURE — 3077F SYST BP >= 140 MM HG: CPT | Mod: CPTII,S$GLB,, | Performed by: NURSE PRACTITIONER

## 2022-02-24 PROCEDURE — 1159F PR MEDICATION LIST DOCUMENTED IN MEDICAL RECORD: ICD-10-PCS | Mod: CPTII,S$GLB,, | Performed by: NURSE PRACTITIONER

## 2022-02-24 PROCEDURE — 3008F PR BODY MASS INDEX (BMI) DOCUMENTED: ICD-10-PCS | Mod: CPTII,S$GLB,, | Performed by: NURSE PRACTITIONER

## 2022-02-24 PROCEDURE — 1160F RVW MEDS BY RX/DR IN RCRD: CPT | Mod: CPTII,S$GLB,, | Performed by: NURSE PRACTITIONER

## 2022-02-24 PROCEDURE — 99214 OFFICE O/P EST MOD 30 MIN: CPT | Mod: S$GLB,,, | Performed by: NURSE PRACTITIONER

## 2022-02-24 PROCEDURE — 1159F MED LIST DOCD IN RCRD: CPT | Mod: CPTII,S$GLB,, | Performed by: NURSE PRACTITIONER

## 2022-02-24 PROCEDURE — 99214 PR OFFICE/OUTPT VISIT, EST, LEVL IV, 30-39 MIN: ICD-10-PCS | Mod: S$GLB,,, | Performed by: NURSE PRACTITIONER

## 2022-02-24 PROCEDURE — 3077F PR MOST RECENT SYSTOLIC BLOOD PRESSURE >= 140 MM HG: ICD-10-PCS | Mod: CPTII,S$GLB,, | Performed by: NURSE PRACTITIONER

## 2022-02-24 PROCEDURE — 1160F PR REVIEW ALL MEDS BY PRESCRIBER/CLIN PHARMACIST DOCUMENTED: ICD-10-PCS | Mod: CPTII,S$GLB,, | Performed by: NURSE PRACTITIONER

## 2022-02-24 RX ORDER — HYDROXYZINE HYDROCHLORIDE 25 MG/1
25 TABLET, FILM COATED ORAL 3 TIMES DAILY PRN
Qty: 30 TABLET | Refills: 3 | Status: SHIPPED | OUTPATIENT
Start: 2022-02-24 | End: 2022-11-21

## 2022-02-24 RX ORDER — METHYLPREDNISOLONE 4 MG/1
TABLET ORAL
Qty: 1 EACH | Refills: 0 | Status: SHIPPED | OUTPATIENT
Start: 2022-02-24 | End: 2022-03-15 | Stop reason: SDUPTHER

## 2022-02-24 NOTE — PROGRESS NOTES
MEDICAL ONCOLOGY FOLLOW UP CONSULTATION NOTE    Chief Complaint: Lung mass    HPI: Patient is a 55 year old female current smoker who underwent screening CT scan chest by her PCP which showed a suspicious mass arising from the left lower lobe with bronchial obstruction highly suspicious for malignancy. She presents to our clinic today for further evaluation. Denies weight loss, activity and appetite changes. Does complain of cough which she attributes it to smoking and says it is chronic with no changes.     Ct Chest 2/10/22:  Left lower mass significantly decreased.  Measures 4.1 x 3.2 x 4.3 cm compared to pretreatment dimensions of 10 cm on CT scan September 27, 2021 and PET-CT on October 11, 2021.        CT scan w/o contrast: 9/27/2021    1.  Category 4x: Very suspicious lesion. Large soft tissue mass effect in   the left lower lobe associated with bronchial obstruction highly suspicious    for malignancy.      Chest CT with or without contrast, CT/PET and/or tissue sampling depending   on the probability of malignancy and comorbidities. Consultation with   pulmonology is also recommended.         PET scan: 10/11/2021  == Large left lower lobe hypermetabolic pass consistent with malignancy.  It has a maximum            diameter on the CT of approximately 10 cm it shows intense radiotracer uptake with the SUV being 17.7      DIAGNOSIS:   11/12/2021 BOWERS/kml   LUNG, LEFT LOWER LOBE, EBUS-FNA:   -- SMALL CELL CARCINOMA.   -- SEE COMMENT.   Comment:   A panel of immunohistochemical stains are performed on block 1A to further   characterize the neoplasm.  The tumor cells are positive for pancytokeratin,   CK7, CK8/18, and CD56.  The cells of interest are negative for P40,   synaptophysin, TTF1, CD3, CD20, CK20, and S100.  The CK7 demonstrates some   dot-like pattern expression.  Controls performed as expected.  The above   findings support the diagnosis of small cell carcinoma.             Labs:  Lab Results    Component Value Date    WBC 3.9 (L) 02/21/2022    HGB 7.7 (L) 02/21/2022    HCT 23.3 (L) 02/21/2022    .4 (H) 02/21/2022    LABPLAT 190 02/21/2022      Platelets   Date Value Ref Range Status   02/21/2022 190 142 - 424 10*3/uL Final     CMP  Sodium   Date Value Ref Range Status   02/21/2022 141 135 - 145 mmol/L Final     Potassium   Date Value Ref Range Status   02/21/2022 3.7 3.6 - 5.2 mmol/L Final     Chloride   Date Value Ref Range Status   02/21/2022 103 100 - 108 mmol/L Final     CO2   Date Value Ref Range Status   02/21/2022 27 21 - 32 mmol/L Final     Glucose   Date Value Ref Range Status   02/21/2022 131 (H) 70 - 110 mg/dL Final     BUN   Date Value Ref Range Status   02/21/2022 7 7 - 18 mg/dL Final     Creatinine   Date Value Ref Range Status   02/21/2022 0.78 0.55 - 1.02 mg/dL Final     Calcium   Date Value Ref Range Status   02/21/2022 7.9 (L) 8.8 - 10.5 mg/dL Final     Total Protein   Date Value Ref Range Status   02/21/2022 6.5 6.4 - 8.2 g/dL Final     Albumin   Date Value Ref Range Status   02/21/2022 3.4 3.4 - 5.0 g/dL Final     Total Bilirubin   Date Value Ref Range Status   02/21/2022 0.4 0.0 - 1.0 mg/dL Final     Alkaline Phosphatase   Date Value Ref Range Status   02/21/2022 121 (H) 46 - 116 U/L Final     AST   Date Value Ref Range Status   02/21/2022 26 15 - 37 U/L Final     ALT   Date Value Ref Range Status   02/21/2022 31 12 - 78 U/L Final     Anion Gap   Date Value Ref Range Status   02/21/2022 11.0 3.0 - 11.0 mmol/L Final     eGFR if    Date Value Ref Range Status   09/17/2021 124 > OR = 60 mL/min/1.73m2 Final     eGFR if non    Date Value Ref Range Status   09/17/2021 107 > OR = 60 mL/min/1.73m2 Final            Past Medical History:   Diagnosis Date    Allergy     GERD (gastroesophageal reflux disease)     Hiatal hernia     Small cell lung cancer 11/15/2021     Family History   Problem Relation Age of Onset    Emphysema Mother     Diabetes  Mother     Diabetes Father     Alcohol abuse Father     No Known Problems Sister     No Known Problems Brother     No Known Problems Maternal Aunt     No Known Problems Maternal Uncle     No Known Problems Paternal Aunt     No Known Problems Paternal Uncle     No Known Problems Maternal Grandmother     No Known Problems Maternal Grandfather     No Known Problems Paternal Grandmother     No Known Problems Paternal Grandfather     No Known Problems Daughter      Social History     Socioeconomic History    Marital status:    Tobacco Use    Smoking status: Current Every Day Smoker     Packs/day: 0.50     Years: 40.00     Pack years: 20.00     Types: Cigarettes    Smokeless tobacco: Never Used   Substance and Sexual Activity    Alcohol use: Yes     Alcohol/week: 10.0 standard drinks     Types: 10 Cans of beer per week    Drug use: Never    Sexual activity: Yes     Partners: Male     Birth control/protection: None     Past Surgical History:   Procedure Laterality Date    COLONOSCOPY       in [place] and he did the dialation     ESOPHAGEAL DILATION      SPINAL FUSION      neck         Review of systems:  Review of Systems   Constitutional: Negative for activity change, appetite change, chills, diaphoresis, fatigue and unexpected weight change.   HENT: Negative for congestion, facial swelling, hearing loss, mouth sores, trouble swallowing and voice change.    Eyes: Negative for photophobia, pain, discharge and itching.   Respiratory: Negative for apnea, cough, choking, chest tightness, shortness of breath, wheezing and stridor.    Cardiovascular: Negative for chest pain, palpitations and leg swelling.   Gastrointestinal: Negative for abdominal distention, abdominal pain, anal bleeding and blood in stool.   Endocrine: Negative for cold intolerance, heat intolerance, polydipsia and polyphagia.   Genitourinary: Negative for difficulty urinating, dysuria, flank pain and hematuria.    Musculoskeletal: Positive for arthralgias. Negative for back pain, joint swelling, myalgias, neck pain and neck stiffness.        +ve for joint pain   Skin: Positive for rash. Negative for color change, pallor and wound.   Allergic/Immunologic: Negative for environmental allergies, food allergies and immunocompromised state.   Neurological: Negative for dizziness, seizures, facial asymmetry, speech difficulty, light-headedness, numbness and headaches.   Hematological: Negative for adenopathy. Does not bruise/bleed easily.   Psychiatric/Behavioral: Negative for agitation, behavioral problems, confusion, decreased concentration and sleep disturbance.       Physical Exam  Vitals and nursing note reviewed.   Constitutional:       General: She is not in acute distress.     Appearance: Normal appearance. She is not ill-appearing.   HENT:      Head: Normocephalic and atraumatic.      Nose: No congestion or rhinorrhea.   Eyes:      General: No scleral icterus.     Extraocular Movements: Extraocular movements intact.      Pupils: Pupils are equal, round, and reactive to light.   Cardiovascular:      Rate and Rhythm: Normal rate and regular rhythm.      Pulses: Normal pulses.      Heart sounds: Normal heart sounds. No murmur heard.    No gallop.   Pulmonary:      Effort: Pulmonary effort is normal. No respiratory distress.      Breath sounds: Normal breath sounds. No stridor. No wheezing or rhonchi.      Comments: +ve for coarse breath sounds  Abdominal:      General: Bowel sounds are normal. There is no distension.      Palpations: There is no mass.      Tenderness: There is no abdominal tenderness. There is no guarding.   Musculoskeletal:         General: No swelling, tenderness, deformity or signs of injury. Normal range of motion.      Cervical back: Normal range of motion and neck supple. No rigidity. No muscular tenderness.      Right lower leg: No edema.      Left lower leg: No edema.   Skin:     General: Skin is  warm and dry.      Coloration: Skin is not jaundiced or pale.      Findings: Rash present. No bruising or lesion.   Neurological:      General: No focal deficit present.      Mental Status: She is alert and oriented to person, place, and time.      Cranial Nerves: No cranial nerve deficit.      Sensory: No sensory deficit.      Motor: No weakness.      Gait: Gait normal.   Psychiatric:         Mood and Affect: Mood normal.         Behavior: Behavior normal.         Thought Content: Thought content normal.       Vitals:    02/24/22 1119   BP: (!) 164/82   Pulse: 97   Resp: 18   Temp: 97.4 °F (36.3 °C)   Body surface area is 1.71 meters squared.          Assessment/Plan:      ECOG 1    1. Limited Stage Small cell carcinoma: [id number], Stage IIIA    == Diagnosis via Left lower lobe EBUS: FNA.  == I do not feel resectability would be an option which is the preferred option per NCCN guidelines., looking at the size and location. No evidence of metastatic disease on imaging. However, on the last chest X-ray  she was noted to have suspected left sided effusion, this did not correlate with PET scan but obviously concerning.  She would like to seek a second opinion at [facility] and  I discussed this with her that while she does this, she should not delay start of her treatment considering the size of the mass. Our plan should be for concurrent chemoradiation. Referral for port placement placed. Patient has been discussed in [facility] before and it was decided to get EBUS FNA to get the diagnosis and to start treatment as soon as possible  11/23/21:  Patient in clinic today to discuss chemotherapy side effects, risks versus benefits, and expected outcomes.  Consents have been signed and all questions answered and medications have been sent to pharmacy.  Patient will tentatively begin chemotherapy next week once radiation is ready to begin.  Labs to be drawn today prior to beginning treatment next week.   12/21/21: patient states  tolerated cycle 1 chemo well with only occasional nausea. Well controlled with phenergan. Labs reviewed and pt cleared for cycle 2.   1/11/22:  Patient continues to tolerate chemotherapy very well.  She denies any nausea vomiting diarrhea or malaise.  She states she completes radiation on Thursday.  Labs reviewed and patient is cleared for cycle 3 chemotherapy is planned for today 3 Thursday.  She is requesting referral to smoking cessation which was placed today.   2/1/22:  Patient in clinic today with complaints of progressive fatigue, dyspnea noted when walking to her bathroom, and sleeping excessively.  Labs reviewed and hemoglobin 7.5 will plan to proceed with transfusion today and patient will continue with her chemo as planned.  She denies any chest pains or palpitations.  She is encouraged to reach out to clinic if her symptoms do not resolve in the next 1-2 days.  Otherwise we will see her as planned in 3 weeks prior to cycle 5.  2/22/22:  Patient continues to tolerate chemotherapy very well, she denies any chest palpitations shortness of breath at rest or excessive fatigue today.  Patient had recent CT chest for Dr. Lopez showing dramatic improvement in lung mass now 4.1 cm compared to 10 cm pretreatment.  Patient is to proceed with cycle 5 chemotherapy as planned for today.  She is to have an upcoming brain MRI and evaluation for prophylactic cranial irradiation with Dr. Lopez and after she completes chemotherapy.  Patient is aware that after completion of chemotherapy she will transition to immunotherapy for 1 year.  2/24/22:  Patient walked into clinic today due to complaints of new onset red itchy rash.  Rash is noted to her upper chest, arms, face and scalp.  She denies any shortness of breath difficulty breathing wheezing or stridor.  Patient received cycle 5 of carbo etoposide day 1 yesterday and had returned for cycle 5 day 2 of etoposide.  She states with cycle 4 she noticed a very small rash that  was similar in nature but did not report it to clinic or infusion.  Cycle 5 day 2 and 3 will be held at this time due to allergic reaction.  Atarax and Medrol Dosepak have been sent to pharmacy.  Patient is to go to ER if rash progresses, or she has any respiratory difficulties.    2. Current Smoker:    == Smoking cessation counseling done. She will try to cut down      RTC in 3 weeks for MD visit prior to cycle 6      Total time spent in counseling and discussion about further management options including relevant lab work, treatment,  prognosis, medications and intended side effects was more than 25 minutes. More than 50% of the time was spent on counseling and coordination of care.  I spent a total of 60 minutes on the day of the visit.This includes face to face time and non-face to face time preparing to see the patient (eg, review of tests), Obtaining and/or reviewing separately obtained history, Documenting clinical information in the electronic or other health record, Independently interpreting resultsand communicating results to the patient/family/caregiver, or Care coordination.

## 2022-03-03 DIAGNOSIS — C34.90 SMALL CELL LUNG CANCER: Primary | ICD-10-CM

## 2022-03-14 ENCOUNTER — CLINICAL SUPPORT (OUTPATIENT)
Dept: HEMATOLOGY/ONCOLOGY | Facility: CLINIC | Age: 57
End: 2022-03-14
Payer: MEDICAID

## 2022-03-14 DIAGNOSIS — C34.90 SMALL CELL LUNG CANCER: ICD-10-CM

## 2022-03-14 LAB
ALBUMIN SERPL BCP-MCNC: 3.9 G/DL (ref 3.4–5)
ALBUMIN/GLOBULIN RATIO: 1.22 RATIO (ref 1.1–1.8)
ALP SERPL-CCNC: 126 U/L (ref 46–116)
ALT SERPL W P-5'-P-CCNC: 40 U/L (ref 12–78)
ANION GAP SERPL CALC-SCNC: 12 MMOL/L (ref 3–11)
ANISOCYTOSIS: ABNORMAL
AST SERPL-CCNC: 31 U/L (ref 15–37)
BANDS: 4 % (ref 0–5)
BILIRUB SERPL-MCNC: 0.4 MG/DL (ref 0–1)
BUN SERPL-MCNC: 6 MG/DL (ref 7–18)
BUN/CREAT SERPL: 9.83 RATIO (ref 7–18)
CALCIUM SERPL-MCNC: 9 MG/DL (ref 8.8–10.5)
CELLS COUNTED: 100
CHLORIDE SERPL-SCNC: 102 MMOL/L (ref 100–108)
CO2 SERPL-SCNC: 26 MMOL/L (ref 21–32)
CREAT SERPL-MCNC: 0.61 MG/DL (ref 0.55–1.02)
ERYTHROCYTE [DISTWIDTH] IN BLOOD BY AUTOMATED COUNT: 24.2 % (ref 12.5–18)
GFR ESTIMATION: > 60
GLOBULIN: 3.2 G/DL (ref 2.3–3.5)
GLUCOSE SERPL-MCNC: 109 MG/DL (ref 70–110)
HCT VFR BLD AUTO: 26.7 % (ref 37–47)
HGB BLD-MCNC: 9.1 G/DL (ref 12–16)
LYMPHOCYTES NFR BLD: 9 % (ref 25–40)
MACROCYTES BLD QL SMEAR: ABNORMAL
MCH RBC QN AUTO: 40.6 PG (ref 27–31.2)
MCHC RBC AUTO-ENTMCNC: 34.1 G/DL (ref 31.8–35.4)
MCV RBC AUTO: 119.2 FL (ref 80–97)
MONOCYTES NFR BLD: 13 % (ref 1–15)
NEUTROPHILS # BLD AUTO: 3.8 10*3/UL (ref 1.8–7.7)
NEUTROPHILS NFR BLD: 74 % (ref 37–80)
NUCLEATED RED BLOOD CELLS: 0 %
PLATELETS: 263 10*3/UL (ref 142–424)
POTASSIUM SERPL-SCNC: 3.8 MMOL/L (ref 3.6–5.2)
PROT SERPL-MCNC: 7.1 G/DL (ref 6.4–8.2)
RBC # BLD AUTO: 2.24 10*6/UL (ref 4.2–5.4)
SMALL PLATELETS BLD QL SMEAR: ADEQUATE
SODIUM BLD-SCNC: 140 MMOL/L (ref 135–145)
STOMATOCYTES BLD QL SMEAR: ABNORMAL
WBC # BLD: 4.9 10*3/UL (ref 4.6–10.2)

## 2022-03-15 ENCOUNTER — OFFICE VISIT (OUTPATIENT)
Dept: HEMATOLOGY/ONCOLOGY | Facility: CLINIC | Age: 57
End: 2022-03-15
Payer: MEDICAID

## 2022-03-15 ENCOUNTER — TELEPHONE (OUTPATIENT)
Dept: HEMATOLOGY/ONCOLOGY | Facility: CLINIC | Age: 57
End: 2022-03-15

## 2022-03-15 VITALS
TEMPERATURE: 98 F | DIASTOLIC BLOOD PRESSURE: 87 MMHG | SYSTOLIC BLOOD PRESSURE: 148 MMHG | RESPIRATION RATE: 18 BRPM | HEIGHT: 66 IN | BODY MASS INDEX: 22.1 KG/M2 | OXYGEN SATURATION: 96 % | WEIGHT: 137.5 LBS | HEART RATE: 102 BPM

## 2022-03-15 DIAGNOSIS — Z91.89 AT RISK FOR ALLERGIC REACTION TO MEDICATION: ICD-10-CM

## 2022-03-15 DIAGNOSIS — C34.90 SMALL CELL LUNG CANCER: Primary | ICD-10-CM

## 2022-03-15 PROCEDURE — 3008F PR BODY MASS INDEX (BMI) DOCUMENTED: ICD-10-PCS | Mod: CPTII,S$GLB,, | Performed by: NURSE PRACTITIONER

## 2022-03-15 PROCEDURE — 99214 PR OFFICE/OUTPT VISIT, EST, LEVL IV, 30-39 MIN: ICD-10-PCS | Mod: S$GLB,,, | Performed by: NURSE PRACTITIONER

## 2022-03-15 PROCEDURE — 3077F PR MOST RECENT SYSTOLIC BLOOD PRESSURE >= 140 MM HG: ICD-10-PCS | Mod: CPTII,S$GLB,, | Performed by: NURSE PRACTITIONER

## 2022-03-15 PROCEDURE — 3077F SYST BP >= 140 MM HG: CPT | Mod: CPTII,S$GLB,, | Performed by: NURSE PRACTITIONER

## 2022-03-15 PROCEDURE — 99214 OFFICE O/P EST MOD 30 MIN: CPT | Mod: S$GLB,,, | Performed by: NURSE PRACTITIONER

## 2022-03-15 PROCEDURE — 3079F DIAST BP 80-89 MM HG: CPT | Mod: CPTII,S$GLB,, | Performed by: NURSE PRACTITIONER

## 2022-03-15 PROCEDURE — 3008F BODY MASS INDEX DOCD: CPT | Mod: CPTII,S$GLB,, | Performed by: NURSE PRACTITIONER

## 2022-03-15 PROCEDURE — 3079F PR MOST RECENT DIASTOLIC BLOOD PRESSURE 80-89 MM HG: ICD-10-PCS | Mod: CPTII,S$GLB,, | Performed by: NURSE PRACTITIONER

## 2022-03-15 RX ORDER — HEPARIN 100 UNIT/ML
500 SYRINGE INTRAVENOUS
Status: CANCELLED | OUTPATIENT
Start: 2022-03-15

## 2022-03-15 RX ORDER — METHYLPREDNISOLONE 4 MG/1
TABLET ORAL
Qty: 1 EACH | Refills: 0 | Status: SHIPPED | OUTPATIENT
Start: 2022-03-15 | End: 2022-06-13 | Stop reason: SDUPTHER

## 2022-03-15 RX ORDER — SODIUM CHLORIDE 0.9 % (FLUSH) 0.9 %
10 SYRINGE (ML) INJECTION
Status: CANCELLED | OUTPATIENT
Start: 2022-03-17

## 2022-03-15 RX ORDER — SODIUM CHLORIDE 0.9 % (FLUSH) 0.9 %
10 SYRINGE (ML) INJECTION
Status: CANCELLED | OUTPATIENT
Start: 2022-03-16

## 2022-03-15 RX ORDER — HEPARIN 100 UNIT/ML
500 SYRINGE INTRAVENOUS
Status: CANCELLED | OUTPATIENT
Start: 2022-03-17

## 2022-03-15 RX ORDER — HEPARIN 100 UNIT/ML
500 SYRINGE INTRAVENOUS
Status: CANCELLED | OUTPATIENT
Start: 2022-03-16

## 2022-03-15 RX ORDER — SODIUM CHLORIDE 0.9 % (FLUSH) 0.9 %
10 SYRINGE (ML) INJECTION
Status: CANCELLED | OUTPATIENT
Start: 2022-03-15

## 2022-03-15 NOTE — PROGRESS NOTES
MEDICAL ONCOLOGY FOLLOW UP CONSULTATION NOTE    Chief Complaint: Lung mass    HPI: Patient is a 55 year old female current smoker who underwent screening CT scan chest by her PCP which showed a suspicious mass arising from the left lower lobe with bronchial obstruction highly suspicious for malignancy. She presents to our clinic today for further evaluation. Denies weight loss, activity and appetite changes. Does complain of cough which she attributes it to smoking and says it is chronic with no changes.     Ct Chest 2/10/22:  Left lower mass significantly decreased.  Measures 4.1 x 3.2 x 4.3 cm compared to pretreatment dimensions of 10 cm on CT scan September 27, 2021 and PET-CT on October 11, 2021.        CT scan w/o contrast: 9/27/2021    1.  Category 4x: Very suspicious lesion. Large soft tissue mass effect in   the left lower lobe associated with bronchial obstruction highly suspicious    for malignancy.      Chest CT with or without contrast, CT/PET and/or tissue sampling depending   on the probability of malignancy and comorbidities. Consultation with   pulmonology is also recommended.         PET scan: 10/11/2021  == Large left lower lobe hypermetabolic pass consistent with malignancy.  It has a maximum            diameter on the CT of approximately 10 cm it shows intense radiotracer uptake with the SUV being 17.7      DIAGNOSIS:   11/12/2021 BOWERS/kml   LUNG, LEFT LOWER LOBE, EBUS-FNA:   -- SMALL CELL CARCINOMA.   -- SEE COMMENT.   Comment:   A panel of immunohistochemical stains are performed on block 1A to further   characterize the neoplasm.  The tumor cells are positive for pancytokeratin,   CK7, CK8/18, and CD56.  The cells of interest are negative for P40,   synaptophysin, TTF1, CD3, CD20, CK20, and S100.  The CK7 demonstrates some   dot-like pattern expression.  Controls performed as expected.  The above   findings support the diagnosis of small cell carcinoma.             Labs:  Lab Results    Component Value Date    WBC 4.9 03/14/2022    HGB 9.1 (L) 03/14/2022    HCT 26.7 (L) 03/14/2022    .2 (H) 03/14/2022    LABPLAT 263 03/14/2022      Platelets   Date Value Ref Range Status   03/14/2022 263 142 - 424 10*3/uL Final     CMP  Sodium   Date Value Ref Range Status   03/14/2022 140 135 - 145 mmol/L Final     Potassium   Date Value Ref Range Status   03/14/2022 3.8 3.6 - 5.2 mmol/L Final     Chloride   Date Value Ref Range Status   03/14/2022 102 100 - 108 mmol/L Final     CO2   Date Value Ref Range Status   03/14/2022 26 21 - 32 mmol/L Final     Glucose   Date Value Ref Range Status   03/14/2022 109 70 - 110 mg/dL Final     BUN   Date Value Ref Range Status   03/14/2022 6 (L) 7 - 18 mg/dL Final     Creatinine   Date Value Ref Range Status   03/14/2022 0.61 0.55 - 1.02 mg/dL Final     Calcium   Date Value Ref Range Status   03/14/2022 9.0 8.8 - 10.5 mg/dL Final     Total Protein   Date Value Ref Range Status   03/14/2022 7.1 6.4 - 8.2 g/dL Final     Albumin   Date Value Ref Range Status   03/14/2022 3.9 3.4 - 5.0 g/dL Final     Total Bilirubin   Date Value Ref Range Status   03/14/2022 0.4 0.0 - 1.0 mg/dL Final     Alkaline Phosphatase   Date Value Ref Range Status   03/14/2022 126 (H) 46 - 116 U/L Final     AST   Date Value Ref Range Status   03/14/2022 31 15 - 37 U/L Final     ALT   Date Value Ref Range Status   03/14/2022 40 12 - 78 U/L Final     Anion Gap   Date Value Ref Range Status   03/14/2022 12.0 (H) 3.0 - 11.0 mmol/L Final     eGFR if    Date Value Ref Range Status   09/17/2021 124 > OR = 60 mL/min/1.73m2 Final     eGFR if non    Date Value Ref Range Status   09/17/2021 107 > OR = 60 mL/min/1.73m2 Final            Past Medical History:   Diagnosis Date    Allergy     GERD (gastroesophageal reflux disease)     Hiatal hernia     Small cell lung cancer 11/15/2021     Family History   Problem Relation Age of Onset    Emphysema Mother     Diabetes Mother      Diabetes Father     Alcohol abuse Father     No Known Problems Sister     No Known Problems Brother     No Known Problems Maternal Aunt     No Known Problems Maternal Uncle     No Known Problems Paternal Aunt     No Known Problems Paternal Uncle     No Known Problems Maternal Grandmother     No Known Problems Maternal Grandfather     No Known Problems Paternal Grandmother     No Known Problems Paternal Grandfather     No Known Problems Daughter      Social History     Socioeconomic History    Marital status:    Tobacco Use    Smoking status: Current Every Day Smoker     Packs/day: 0.50     Years: 40.00     Pack years: 20.00     Types: Cigarettes    Smokeless tobacco: Never Used   Substance and Sexual Activity    Alcohol use: Yes     Alcohol/week: 10.0 standard drinks     Types: 10 Cans of beer per week    Drug use: Never    Sexual activity: Yes     Partners: Male     Birth control/protection: None     Past Surgical History:   Procedure Laterality Date    COLONOSCOPY       in [place] and he did the dialation     ESOPHAGEAL DILATION      SPINAL FUSION      neck         Review of systems:  Review of Systems   Constitutional: Negative for activity change, appetite change, chills, diaphoresis, fatigue and unexpected weight change.   HENT: Negative for congestion, facial swelling, hearing loss, mouth sores, trouble swallowing and voice change.    Eyes: Negative for photophobia, pain, discharge and itching.   Respiratory: Negative for apnea, cough, choking, chest tightness, shortness of breath, wheezing and stridor.    Cardiovascular: Negative for chest pain, palpitations and leg swelling.   Gastrointestinal: Negative for abdominal distention, abdominal pain, anal bleeding and blood in stool.   Endocrine: Negative for cold intolerance, heat intolerance, polydipsia and polyphagia.   Genitourinary: Negative for difficulty urinating, dysuria, flank pain and hematuria.   Musculoskeletal:  Positive for arthralgias. Negative for back pain, joint swelling, myalgias, neck pain and neck stiffness.        +ve for joint pain   Skin: Positive for rash. Negative for color change, pallor and wound.   Allergic/Immunologic: Negative for environmental allergies, food allergies and immunocompromised state.   Neurological: Negative for dizziness, seizures, facial asymmetry, speech difficulty, light-headedness, numbness and headaches.   Hematological: Negative for adenopathy. Does not bruise/bleed easily.   Psychiatric/Behavioral: Negative for agitation, behavioral problems, confusion, decreased concentration and sleep disturbance.       Physical Exam  Vitals and nursing note reviewed.   Constitutional:       General: She is not in acute distress.     Appearance: Normal appearance. She is not ill-appearing.   HENT:      Head: Normocephalic and atraumatic.      Nose: No congestion or rhinorrhea.   Eyes:      General: No scleral icterus.     Extraocular Movements: Extraocular movements intact.      Pupils: Pupils are equal, round, and reactive to light.   Cardiovascular:      Rate and Rhythm: Normal rate and regular rhythm.      Pulses: Normal pulses.      Heart sounds: Normal heart sounds. No murmur heard.    No gallop.   Pulmonary:      Effort: Pulmonary effort is normal. No respiratory distress.      Breath sounds: Normal breath sounds. No stridor. No wheezing or rhonchi.      Comments: +ve for coarse breath sounds  Abdominal:      General: Bowel sounds are normal. There is no distension.      Palpations: There is no mass.      Tenderness: There is no abdominal tenderness. There is no guarding.   Musculoskeletal:         General: No swelling, tenderness, deformity or signs of injury. Normal range of motion.      Cervical back: Normal range of motion and neck supple. No rigidity. No muscular tenderness.      Right lower leg: No edema.      Left lower leg: No edema.   Skin:     General: Skin is warm and dry.       Coloration: Skin is not jaundiced or pale.      Findings: Rash present. No bruising or lesion.   Neurological:      General: No focal deficit present.      Mental Status: She is alert and oriented to person, place, and time.      Cranial Nerves: No cranial nerve deficit.      Sensory: No sensory deficit.      Motor: No weakness.      Gait: Gait normal.   Psychiatric:         Mood and Affect: Mood normal.         Behavior: Behavior normal.         Thought Content: Thought content normal.       Vitals:    03/15/22 0923   BP: (!) 148/87   Pulse: 102   Resp: 18   Temp: 97.5 °F (36.4 °C)   Body surface area is 1.7 meters squared.          Assessment/Plan:      ECOG 1    1. Limited Stage Small cell carcinoma: [id number], Stage IIIA    == Diagnosis via Left lower lobe EBUS: FNA.  == I do not feel resectability would be an option which is the preferred option per NCCN guidelines., looking at the size and location. No evidence of metastatic disease on imaging. However, on the last chest X-ray  she was noted to have suspected left sided effusion, this did not correlate with PET scan but obviously concerning.  She would like to seek a second opinion at [facility] and  I discussed this with her that while she does this, she should not delay start of her treatment considering the size of the mass. Our plan should be for concurrent chemoradiation. Referral for port placement placed. Patient has been discussed in [facility] before and it was decided to get EBUS FNA to get the diagnosis and to start treatment as soon as possible  11/23/21:  Patient in clinic today to discuss chemotherapy side effects, risks versus benefits, and expected outcomes.  Consents have been signed and all questions answered and medications have been sent to pharmacy.  Patient will tentatively begin chemotherapy next week once radiation is ready to begin.  Labs to be drawn today prior to beginning treatment next week.   12/21/21: patient states tolerated cycle 1  chemo well with only occasional nausea. Well controlled with phenergan. Labs reviewed and pt cleared for cycle 2.   1/11/22:  Patient continues to tolerate chemotherapy very well.  She denies any nausea vomiting diarrhea or malaise.  She states she completes radiation on Thursday.  Labs reviewed and patient is cleared for cycle 3 chemotherapy is planned for today 3 Thursday.  She is requesting referral to smoking cessation which was placed today.   2/1/22:  Patient in clinic today with complaints of progressive fatigue, dyspnea noted when walking to her bathroom, and sleeping excessively.  Labs reviewed and hemoglobin 7.5 will plan to proceed with transfusion today and patient will continue with her chemo as planned.  She denies any chest pains or palpitations.  She is encouraged to reach out to clinic if her symptoms do not resolve in the next 1-2 days.  Otherwise we will see her as planned in 3 weeks prior to cycle 5.  2/22/22:  Patient continues to tolerate chemotherapy very well, she denies any chest palpitations shortness of breath at rest or excessive fatigue today.  Patient had recent CT chest for Dr. Lopez showing dramatic improvement in lung mass now 4.1 cm compared to 10 cm pretreatment.  Patient is to proceed with cycle 5 chemotherapy as planned for today.  She is to have an upcoming brain MRI and evaluation for prophylactic cranial irradiation with Dr. Lopez and after she completes chemotherapy.  Patient is aware that after completion of chemotherapy she will transition to immunotherapy for 1 year.  2/24/22:  Patient walked into clinic today due to complaints of new onset red itchy rash.  Rash is noted to her upper chest, arms, face and scalp.  She denies any shortness of breath difficulty breathing wheezing or stridor.  Patient received cycle 5 of carbo etoposide day 1 yesterday and had returned for cycle 5 day 2 of etoposide.  She states with cycle 4 she noticed a very small rash that was similar in  nature but did not report it to clinic or infusion.  Cycle 5 day 2 and 3 will be held at this time due to allergic reaction.  Atarax and Medrol Dosepak have been sent to pharmacy.  Patient is to go to ER if rash progresses, or she has any respiratory difficulties.  3/15/22:  Patient states allergic reaction resolved immediately after beginning Medrol Dosepak.  Plan is to increase premed Decadron to 20 mg prior to cycle cycle 6.  Additional Medrol Dosepak sent to pharmacy in the event patient has resumption of allergic reaction symptom of itching.  Patient is to complete cycle 6 of 6 chemotherapy this week.  She is scheduled for brain MRI prior to seeing Dr. Lopez in mid April.  We will request PET-CT to be done in early April as well.    2. Current Smoker:    == Smoking cessation counseling done. She will try to cut down      RTC in 4 weeks in [place] with PET scan only    Total time spent in counseling and discussion about further management options including relevant lab work, treatment,  prognosis, medications and intended side effects was more than 25 minutes. More than 50% of the time was spent on counseling and coordination of care.  I spent a total of 60 minutes on the day of the visit.This includes face to face time and non-face to face time preparing to see the patient (eg, review of tests), Obtaining and/or reviewing separately obtained history, Documenting clinical information in the electronic or other health record, Independently interpreting resultsand communicating results to the patient/family/caregiver, or Care coordination.

## 2022-03-16 ENCOUNTER — TELEPHONE (OUTPATIENT)
Dept: HEMATOLOGY/ONCOLOGY | Facility: CLINIC | Age: 57
End: 2022-03-16
Payer: MEDICAID

## 2022-03-16 NOTE — TELEPHONE ENCOUNTER
Spoke to patient and told her that she only needs to start medrol pack when she starts itching. Voices understanding      ----- Message from Vianey Clifton sent at 3/15/2022  4:28 PM CDT -----  Regarding: Medical Assistance  Contact: Patient  Patient is requesting a call back regarding medication prescribed by physician   Please Assist     Patient can be reached at [phone]

## 2022-03-31 DIAGNOSIS — K22.2 ESOPHAGEAL STRICTURE: Primary | ICD-10-CM

## 2022-04-13 ENCOUNTER — OFFICE VISIT (OUTPATIENT)
Dept: HEMATOLOGY/ONCOLOGY | Facility: CLINIC | Age: 57
End: 2022-04-13
Payer: MEDICAID

## 2022-04-13 VITALS
TEMPERATURE: 97 F | OXYGEN SATURATION: 99 % | WEIGHT: 135 LBS | SYSTOLIC BLOOD PRESSURE: 124 MMHG | HEART RATE: 99 BPM | BODY MASS INDEX: 21.69 KG/M2 | HEIGHT: 66 IN | DIASTOLIC BLOOD PRESSURE: 78 MMHG | RESPIRATION RATE: 16 BRPM

## 2022-04-13 DIAGNOSIS — F17.200 TOBACCO DEPENDENCE SYNDROME: Primary | ICD-10-CM

## 2022-04-13 DIAGNOSIS — F17.200 SMOKING ADDICTION: ICD-10-CM

## 2022-04-13 DIAGNOSIS — C34.90 SMALL CELL LUNG CANCER: ICD-10-CM

## 2022-04-13 PROCEDURE — 3074F SYST BP LT 130 MM HG: CPT | Mod: CPTII,S$GLB,, | Performed by: NURSE PRACTITIONER

## 2022-04-13 PROCEDURE — 1159F MED LIST DOCD IN RCRD: CPT | Mod: CPTII,S$GLB,, | Performed by: NURSE PRACTITIONER

## 2022-04-13 PROCEDURE — 3008F BODY MASS INDEX DOCD: CPT | Mod: CPTII,S$GLB,, | Performed by: NURSE PRACTITIONER

## 2022-04-13 PROCEDURE — 3078F PR MOST RECENT DIASTOLIC BLOOD PRESSURE < 80 MM HG: ICD-10-PCS | Mod: CPTII,S$GLB,, | Performed by: NURSE PRACTITIONER

## 2022-04-13 PROCEDURE — 99406 PR TOBACCO USE CESSATION INTERMEDIATE 3-10 MINUTES: ICD-10-PCS | Mod: S$GLB,,, | Performed by: NURSE PRACTITIONER

## 2022-04-13 PROCEDURE — 3078F DIAST BP <80 MM HG: CPT | Mod: CPTII,S$GLB,, | Performed by: NURSE PRACTITIONER

## 2022-04-13 PROCEDURE — 99406 BEHAV CHNG SMOKING 3-10 MIN: CPT | Mod: S$GLB,,, | Performed by: NURSE PRACTITIONER

## 2022-04-13 PROCEDURE — 3074F PR MOST RECENT SYSTOLIC BLOOD PRESSURE < 130 MM HG: ICD-10-PCS | Mod: CPTII,S$GLB,, | Performed by: NURSE PRACTITIONER

## 2022-04-13 PROCEDURE — 99214 PR OFFICE/OUTPT VISIT, EST, LEVL IV, 30-39 MIN: ICD-10-PCS | Mod: S$GLB,,, | Performed by: NURSE PRACTITIONER

## 2022-04-13 PROCEDURE — 99214 OFFICE O/P EST MOD 30 MIN: CPT | Mod: S$GLB,,, | Performed by: NURSE PRACTITIONER

## 2022-04-13 PROCEDURE — 1159F PR MEDICATION LIST DOCUMENTED IN MEDICAL RECORD: ICD-10-PCS | Mod: CPTII,S$GLB,, | Performed by: NURSE PRACTITIONER

## 2022-04-13 PROCEDURE — 3008F PR BODY MASS INDEX (BMI) DOCUMENTED: ICD-10-PCS | Mod: CPTII,S$GLB,, | Performed by: NURSE PRACTITIONER

## 2022-04-13 RX ORDER — SODIUM CHLORIDE 0.9 % (FLUSH) 0.9 %
10 SYRINGE (ML) INJECTION
Status: CANCELLED | OUTPATIENT
Start: 2022-06-13

## 2022-04-13 RX ORDER — HEPARIN 100 UNIT/ML
500 SYRINGE INTRAVENOUS
Status: CANCELLED | OUTPATIENT
Start: 2022-06-13

## 2022-04-13 RX ORDER — MEMANTINE HYDROCHLORIDE 5 MG/1
5 TABLET ORAL DAILY
COMMUNITY
Start: 2022-04-07 | End: 2023-05-18

## 2022-04-13 NOTE — PROGRESS NOTES
MEDICAL ONCOLOGY FOLLOW UP CONSULTATION NOTE    Chief Complaint: Lung mass    HPI: Patient is a 55 year old female current smoker who underwent screening CT scan chest by her PCP which showed a suspicious mass arising from the left lower lobe with bronchial obstruction highly suspicious for malignancy. She presents to our clinic today for further evaluation. Denies weight loss, activity and appetite changes. Does complain of cough which she attributes it to smoking and says it is chronic with no changes.     Ct Chest 2/10/22:  Left lower mass significantly decreased.  Measures 4.1 x 3.2 x 4.3 cm compared to pretreatment dimensions of 10 cm on CT scan September 27, 2021 and PET-CT on October 11, 2021.        CT scan w/o contrast: 9/27/2021    1.  Category 4x: Very suspicious lesion. Large soft tissue mass effect in   the left lower lobe associated with bronchial obstruction highly suspicious    for malignancy.      Chest CT with or without contrast, CT/PET and/or tissue sampling depending   on the probability of malignancy and comorbidities. Consultation with   pulmonology is also recommended.         PET scan: 10/11/2021  == Large left lower lobe hypermetabolic pass consistent with malignancy.  It has a maximum            diameter on the CT of approximately 10 cm it shows intense radiotracer uptake with the SUV being 17.7      DIAGNOSIS:   11/12/2021 BOWERS/kml   LUNG, LEFT LOWER LOBE, EBUS-FNA:   -- SMALL CELL CARCINOMA.   -- SEE COMMENT.   Comment:   A panel of immunohistochemical stains are performed on block 1A to further   characterize the neoplasm.  The tumor cells are positive for pancytokeratin,   CK7, CK8/18, and CD56.  The cells of interest are negative for P40,   synaptophysin, TTF1, CD3, CD20, CK20, and S100.  The CK7 demonstrates some   dot-like pattern expression.  Controls performed as expected.  The above   findings support the diagnosis of small cell carcinoma.     4/6/22:                Labs:  Lab  Results   Component Value Date    WBC 7.4 06/13/2022    HGB 15.0 06/13/2022    HCT 43.6 06/13/2022    .9 (H) 06/13/2022    LABPLAT 265 06/13/2022      Platelets   Date Value Ref Range Status   06/13/2022 265 142 - 424 10*3/uL Final     CMP  Sodium   Date Value Ref Range Status   06/13/2022 138 135 - 145 mmol/L Final     Potassium   Date Value Ref Range Status   06/13/2022 4.2 3.6 - 5.2 mmol/L Final     Chloride   Date Value Ref Range Status   06/13/2022 101 100 - 108 mmol/L Final     CO2   Date Value Ref Range Status   06/13/2022 27 21 - 32 mmol/L Final     Glucose   Date Value Ref Range Status   06/13/2022 128 (H) 70 - 110 mg/dL Final     BUN   Date Value Ref Range Status   06/13/2022 3 (L) 7 - 18 mg/dL Final     Creatinine   Date Value Ref Range Status   06/13/2022 0.69 0.55 - 1.02 mg/dL Final     Calcium   Date Value Ref Range Status   06/13/2022 8.8 8.8 - 10.5 mg/dL Final     Total Protein   Date Value Ref Range Status   06/13/2022 7.6 6.4 - 8.2 g/dL Final     Albumin   Date Value Ref Range Status   06/13/2022 3.7 3.4 - 5.0 g/dL Final     Total Bilirubin   Date Value Ref Range Status   06/13/2022 0.5 0.0 - 1.0 mg/dL Final     Alkaline Phosphatase   Date Value Ref Range Status   06/13/2022 128 (H) 46 - 116 U/L Final     AST   Date Value Ref Range Status   06/13/2022 37 15 - 37 U/L Final     ALT   Date Value Ref Range Status   06/13/2022 39 12 - 78 U/L Final     Anion Gap   Date Value Ref Range Status   06/13/2022 10.0 3.0 - 11.0 mmol/L Final     eGFR if    Date Value Ref Range Status   09/17/2021 124 > OR = 60 mL/min/1.73m2 Final     eGFR if non    Date Value Ref Range Status   09/17/2021 107 > OR = 60 mL/min/1.73m2 Final            Past Medical History:   Diagnosis Date    Allergy     GERD (gastroesophageal reflux disease)     Hiatal hernia     Small cell lung cancer 11/15/2021     Family History   Problem Relation Age of Onset    Emphysema Mother     Diabetes Mother      Diabetes Father     Alcohol abuse Father     No Known Problems Sister     No Known Problems Brother     No Known Problems Maternal Aunt     No Known Problems Maternal Uncle     No Known Problems Paternal Aunt     No Known Problems Paternal Uncle     No Known Problems Maternal Grandmother     No Known Problems Maternal Grandfather     No Known Problems Paternal Grandmother     No Known Problems Paternal Grandfather     No Known Problems Daughter      Social History     Socioeconomic History    Marital status:    Tobacco Use    Smoking status: Current Every Day Smoker     Packs/day: 0.50     Years: 40.00     Pack years: 20.00     Types: Cigarettes    Smokeless tobacco: Never Used   Substance and Sexual Activity    Alcohol use: Yes     Alcohol/week: 10.0 standard drinks     Types: 10 Cans of beer per week    Drug use: Never    Sexual activity: Yes     Partners: Male     Birth control/protection: None     Past Surgical History:   Procedure Laterality Date    COLONOSCOPY       in [place] and he did the dialation     ESOPHAGEAL DILATION      SPINAL FUSION      neck         Review of systems:  Review of Systems   Constitutional: Negative for activity change, appetite change, chills, diaphoresis, fatigue and unexpected weight change.   HENT: Negative for congestion, facial swelling, hearing loss, mouth sores, trouble swallowing and voice change.    Eyes: Negative for photophobia, pain, discharge and itching.   Respiratory: Negative for apnea, cough, choking, chest tightness, shortness of breath, wheezing and stridor.    Cardiovascular: Negative for chest pain, palpitations and leg swelling.   Gastrointestinal: Negative for abdominal distention, abdominal pain, anal bleeding and blood in stool.   Endocrine: Negative for cold intolerance, heat intolerance, polydipsia and polyphagia.   Genitourinary: Negative for difficulty urinating, dysuria, flank pain and hematuria.   Musculoskeletal:  Positive for arthralgias. Negative for back pain, joint swelling, myalgias, neck pain and neck stiffness.        +ve for joint pain   Skin: Positive for rash. Negative for color change, pallor and wound.   Allergic/Immunologic: Negative for environmental allergies, food allergies and immunocompromised state.   Neurological: Negative for dizziness, seizures, facial asymmetry, speech difficulty, light-headedness, numbness and headaches.   Hematological: Negative for adenopathy. Does not bruise/bleed easily.   Psychiatric/Behavioral: Negative for agitation, behavioral problems, confusion, decreased concentration and sleep disturbance.       Physical Exam  Vitals and nursing note reviewed.   Constitutional:       General: She is not in acute distress.     Appearance: Normal appearance. She is not ill-appearing.   HENT:      Head: Normocephalic and atraumatic.      Nose: No congestion or rhinorrhea.   Eyes:      General: No scleral icterus.     Extraocular Movements: Extraocular movements intact.      Pupils: Pupils are equal, round, and reactive to light.   Cardiovascular:      Rate and Rhythm: Normal rate and regular rhythm.      Pulses: Normal pulses.      Heart sounds: Normal heart sounds. No murmur heard.    No gallop.   Pulmonary:      Effort: Pulmonary effort is normal. No respiratory distress.      Breath sounds: Normal breath sounds. No stridor. No wheezing or rhonchi.      Comments: +ve for coarse breath sounds  Abdominal:      General: Bowel sounds are normal. There is no distension.      Palpations: There is no mass.      Tenderness: There is no abdominal tenderness. There is no guarding.   Musculoskeletal:         General: No swelling, tenderness, deformity or signs of injury. Normal range of motion.      Cervical back: Normal range of motion and neck supple. No rigidity. No muscular tenderness.      Right lower leg: No edema.      Left lower leg: No edema.   Skin:     General: Skin is warm and dry.       Coloration: Skin is not jaundiced or pale.      Findings: Rash present. No bruising or lesion.   Neurological:      General: No focal deficit present.      Mental Status: She is alert and oriented to person, place, and time.      Cranial Nerves: No cranial nerve deficit.      Sensory: No sensory deficit.      Motor: No weakness.      Gait: Gait normal.   Psychiatric:         Mood and Affect: Mood normal.         Behavior: Behavior normal.         Thought Content: Thought content normal.       Vitals:    04/13/22 1316   BP: 124/78   Pulse: 99   Resp: 16   Temp: 97 °F (36.1 °C)   Body surface area is 1.69 meters squared.          Assessment/Plan:      ECOG 1    1. Limited Stage Small cell carcinoma: [id number], Stage IIIA    == Diagnosis via Left lower lobe EBUS: FNA.  == I do not feel resectability would be an option which is the preferred option per NCCN guidelines., looking at the size and location. No evidence of metastatic disease on imaging. However, on the last chest X-ray  she was noted to have suspected left sided effusion, this did not correlate with PET scan but obviously concerning.  She would like to seek a second opinion at [facility] and  I discussed this with her that while she does this, she should not delay start of her treatment considering the size of the mass. Our plan should be for concurrent chemoradiation. Referral for port placement placed. Patient has been discussed in [facility] before and it was decided to get EBUS FNA to get the diagnosis and to start treatment as soon as possible  11/23/21:  Patient in clinic today to discuss chemotherapy side effects, risks versus benefits, and expected outcomes.  Consents have been signed and all questions answered and medications have been sent to pharmacy.  Patient will tentatively begin chemotherapy next week once radiation is ready to begin.  Labs to be drawn today prior to beginning treatment next week.   12/21/21: patient states tolerated cycle 1 chemo well  with only occasional nausea. Well controlled with phenergan. Labs reviewed and pt cleared for cycle 2.   1/11/22:  Patient continues to tolerate chemotherapy very well.  She denies any nausea vomiting diarrhea or malaise.  She states she completes radiation on Thursday.  Labs reviewed and patient is cleared for cycle 3 chemotherapy is planned for today 3 Thursday.  She is requesting referral to smoking cessation which was placed today.   2/1/22:  Patient in clinic today with complaints of progressive fatigue, dyspnea noted when walking to her bathroom, and sleeping excessively.  Labs reviewed and hemoglobin 7.5 will plan to proceed with transfusion today and patient will continue with her chemo as planned.  She denies any chest pains or palpitations.  She is encouraged to reach out to clinic if her symptoms do not resolve in the next 1-2 days.  Otherwise we will see her as planned in 3 weeks prior to cycle 5.  2/22/22:  Patient continues to tolerate chemotherapy very well, she denies any chest palpitations shortness of breath at rest or excessive fatigue today.  Patient had recent CT chest for Dr. Lopez showing dramatic improvement in lung mass now 4.1 cm compared to 10 cm pretreatment.  Patient is to proceed with cycle 5 chemotherapy as planned for today.  She is to have an upcoming brain MRI and evaluation for prophylactic cranial irradiation with Dr. Lopez and after she completes chemotherapy.  Patient is aware that after completion of chemotherapy she will transition to immunotherapy for 1 year.  2/24/22:  Patient walked into clinic today due to complaints of new onset red itchy rash.  Rash is noted to her upper chest, arms, face and scalp.  She denies any shortness of breath difficulty breathing wheezing or stridor.  Patient received cycle 5 of carbo etoposide day 1 yesterday and had returned for cycle 5 day 2 of etoposide.  She states with cycle 4 she noticed a very small rash that was similar in nature but  did not report it to clinic or infusion.  Cycle 5 day 2 and 3 will be held at this time due to allergic reaction.  Atarax and Medrol Dosepak have been sent to pharmacy.  Patient is to go to ER if rash progresses, or she has any respiratory difficulties.  3/15/22:  Patient states allergic reaction resolved immediately after beginning Medrol Dosepak.  Plan is to increase premed Decadron to 20 mg prior to cycle cycle 6.  Additional Medrol Dosepak sent to pharmacy in the event patient has resumption of allergic reaction symptom of itching.  Patient is to complete cycle 6 of 6 chemotherapy this week.  She is scheduled for brain MRI prior to seeing Dr. Lopez in mid April.  We will request PET-CT to be done in early April as well.  4/13/22: review of recent PET/CT shows decrease in left lung mass, previously 10 cm now measuring 3.9 cm. Overall excellent partial response with no new disease noted. She is to f/u with Dr Lopez for WBI in the near future. She will RTC in 3 months with labs.         2. Current Smoker:    == Smoking cessation counseling done. She will try to cut down, but continues to smokes 3/4 ppd.   == referral to smoking cessation placed today          Total time spent in counseling and discussion about further management options including relevant lab work, treatment,  prognosis, medications and intended side effects was more than 25 minutes. More than 50% of the time was spent on counseling and coordination of care.  I spent a total of 60 minutes on the day of the visit.This includes face to face time and non-face to face time preparing to see the patient (eg, review of tests), Obtaining and/or reviewing separately obtained history, Documenting clinical information in the electronic or other health record, Independently interpreting resultsand communicating results to the patient/family/caregiver, or Care coordination.                       Tobacco Use/Cessation:  I assessed Diana Prather and discussed  smoking cessation with her for 3-10 minutes. She  Social History     Tobacco Use   Smoking Status Current Every Day Smoker    Packs/day: 0.50    Years: 40.00    Pack years: 20.00    Types: Cigarettes   Smokeless Tobacco Never Used

## 2022-05-20 ENCOUNTER — OFFICE VISIT (OUTPATIENT)
Dept: PRIMARY CARE CLINIC | Facility: CLINIC | Age: 57
End: 2022-05-20
Payer: MEDICAID

## 2022-05-20 VITALS
HEART RATE: 104 BPM | WEIGHT: 137.38 LBS | OXYGEN SATURATION: 99 % | BODY MASS INDEX: 22.08 KG/M2 | HEIGHT: 66 IN | SYSTOLIC BLOOD PRESSURE: 153 MMHG | DIASTOLIC BLOOD PRESSURE: 91 MMHG

## 2022-05-20 DIAGNOSIS — I10 HYPERTENSION, UNSPECIFIED TYPE: ICD-10-CM

## 2022-05-20 DIAGNOSIS — F17.200 SMOKER: ICD-10-CM

## 2022-05-20 DIAGNOSIS — R11.0 NAUSEA: ICD-10-CM

## 2022-05-20 DIAGNOSIS — D64.9 ANEMIA, UNSPECIFIED TYPE: Primary | ICD-10-CM

## 2022-05-20 DIAGNOSIS — R91.8 MASS OF LEFT LUNG: ICD-10-CM

## 2022-05-20 PROCEDURE — 99214 OFFICE O/P EST MOD 30 MIN: CPT | Mod: S$GLB,,, | Performed by: INTERNAL MEDICINE

## 2022-05-20 PROCEDURE — 1159F MED LIST DOCD IN RCRD: CPT | Mod: CPTII,S$GLB,, | Performed by: INTERNAL MEDICINE

## 2022-05-20 PROCEDURE — 3008F PR BODY MASS INDEX (BMI) DOCUMENTED: ICD-10-PCS | Mod: CPTII,S$GLB,, | Performed by: INTERNAL MEDICINE

## 2022-05-20 PROCEDURE — 3077F SYST BP >= 140 MM HG: CPT | Mod: CPTII,S$GLB,, | Performed by: INTERNAL MEDICINE

## 2022-05-20 PROCEDURE — 3008F BODY MASS INDEX DOCD: CPT | Mod: CPTII,S$GLB,, | Performed by: INTERNAL MEDICINE

## 2022-05-20 PROCEDURE — 99214 PR OFFICE/OUTPT VISIT, EST, LEVL IV, 30-39 MIN: ICD-10-PCS | Mod: S$GLB,,, | Performed by: INTERNAL MEDICINE

## 2022-05-20 PROCEDURE — 3080F PR MOST RECENT DIASTOLIC BLOOD PRESSURE >= 90 MM HG: ICD-10-PCS | Mod: CPTII,S$GLB,, | Performed by: INTERNAL MEDICINE

## 2022-05-20 PROCEDURE — 3080F DIAST BP >= 90 MM HG: CPT | Mod: CPTII,S$GLB,, | Performed by: INTERNAL MEDICINE

## 2022-05-20 PROCEDURE — 1159F PR MEDICATION LIST DOCUMENTED IN MEDICAL RECORD: ICD-10-PCS | Mod: CPTII,S$GLB,, | Performed by: INTERNAL MEDICINE

## 2022-05-20 PROCEDURE — 3077F PR MOST RECENT SYSTOLIC BLOOD PRESSURE >= 140 MM HG: ICD-10-PCS | Mod: CPTII,S$GLB,, | Performed by: INTERNAL MEDICINE

## 2022-05-20 NOTE — PROGRESS NOTES
Subjective:      Patient ID: Diana Prather is a 56 y.o. female.    Chief Complaint: Follow-up    HPI     Patient here for follow up. She reports persistent nausea and is taking zofran and phenergen with little relief, also taking pepto bismol. She is on Vit D, Vitamin B and a multivitamin. She has a h/o esophageal stricture and has been referred to GI and needs to schedule an appointment   She has a hiatal hernia    Review of Systems   Constitutional: Negative for chills, fever and weight loss.   Respiratory: Negative for cough, shortness of breath and wheezing.    Cardiovascular: Negative for chest pain, palpitations and leg swelling.   Gastrointestinal: Positive for nausea. Negative for abdominal pain, blood in stool, constipation, diarrhea, melena and vomiting.   Genitourinary: Negative for dysuria, frequency and urgency.   Musculoskeletal: Negative for falls.   Skin: Negative for rash.   Neurological: Negative for dizziness and headaches.     Objective:     Physical Exam  Vitals reviewed.   Constitutional:       Appearance: Normal appearance.   HENT:      Head: Normocephalic.   Eyes:      Extraocular Movements: Extraocular movements intact.      Conjunctiva/sclera: Conjunctivae normal.      Pupils: Pupils are equal, round, and reactive to light.   Cardiovascular:      Rate and Rhythm: Regular rhythm. Tachycardia present.   Pulmonary:      Effort: Pulmonary effort is normal.      Breath sounds: Normal breath sounds.   Abdominal:      General: Bowel sounds are normal. There is no distension.      Palpations: There is no mass.      Tenderness: There is no abdominal tenderness. There is no right CVA tenderness or left CVA tenderness.      Hernia: No hernia is present.   Musculoskeletal:      Right lower leg: No edema.      Left lower leg: No edema.   Skin:     General: Skin is warm.      Capillary Refill: Capillary refill takes less than 2 seconds.   Neurological:      General: No focal deficit present.      Mental  "Status: She is alert and oriented to person, place, and time.   Psychiatric:         Mood and Affect: Mood normal.        BP (!) 153/91 (BP Location: Right arm, Patient Position: Sitting, BP Method: Medium (Automatic))   Pulse 104   Ht 5' 6" (1.676 m)   Wt 62.3 kg (137 lb 6.4 oz)   LMP  (LMP Unknown)   SpO2 99%   BMI 22.18 kg/m²     Assessment:       ICD-10-CM ICD-9-CM   1. Anemia, unspecified type  D64.9 285.9   2. Mass of left lung  R91.8 786.6   3. Hypertension, unspecified type  I10 401.9   4. Smoker  F17.200 305.1   5. Nausea  R11.0 787.02       Plan:     Medication List with Changes/Refills   Current Medications    ALBUTEROL (PROVENTIL/VENTOLIN HFA) 90 MCG/ACTUATION INHALER    INHALE 2 PUFFS INTO THE LUNGS EVERY 6 HOURS AS NEEDED FOR WHEEZING, RESCUE    ALBUTEROL (PROVENTIL/VENTOLIN HFA) 90 MCG/ACTUATION INHALER    INHALE 2 PUFFS INTO THE LUNGS EVERY 6 HOURS AS NEEDED FOR WHEEZING, RESCUE    BUDESONIDE-FORMOTEROL 160-4.5 MCG (SYMBICORT) 160-4.5 MCG/ACTUATION HFAA    Inhale 2 puffs into the lungs every 12 (twelve) hours. Controller    GABAPENTIN (NEURONTIN) 300 MG CAPSULE    TAKE 1 CAPSULE(300 MG) BY MOUTH TWICE DAILY    HYDROXYZINE HCL (ATARAX) 25 MG TABLET    Take 1 tablet (25 mg total) by mouth 3 (three) times daily as needed for Itching.    LIDOCAINE VISCOUS 2 % SOLUTION        MEMANTINE (NAMENDA) 5 MG TAB    Take 5 mg by mouth once daily at 6am.    METHYLPREDNISOLONE (MEDROL DOSEPACK) 4 MG TABLET    Take 1 tablet (4 mg total) by mouth 6 (six) times daily, THEN 1 tablet (4 mg total) 5 (five) times daily, THEN 1 tablet (4 mg total) 4 (four) times daily, THEN 1 tablet (4 mg total) 3 (three) times daily, THEN 1 tablet (4 mg total) 2 (two) times daily, THEN 1 tablet (4 mg total) once daily. use as directed.    NORTRIPTYLINE (PAMELOR) 50 MG CAPSULE    TAKE 1 CAPSULE(50 MG) BY MOUTH EVERY NIGHT    ONDANSETRON (ZOFRAN-ODT) 8 MG TBDL    Take 1 tablet (8 mg total) by mouth every 6 (six) hours as needed.    " PANTOPRAZOLE (PROTONIX) 40 MG TABLET    Take 1 tablet (40 mg total) by mouth once daily.    PROMETHAZINE (PHENERGAN) 25 MG TABLET    TAKE 1 TABLET(25 MG) BY MOUTH EVERY 8 HOURS        Anemia, unspecified type    Mass of left lung    Hypertension, unspecified type    Smoker    Nausea         She is cutting down on her smoking, needs to schedule with GI, continue multivitamins and iron

## 2022-06-09 ENCOUNTER — PATIENT MESSAGE (OUTPATIENT)
Dept: SMOKING CESSATION | Facility: CLINIC | Age: 57
End: 2022-06-09
Payer: MEDICAID

## 2022-06-13 ENCOUNTER — TELEPHONE (OUTPATIENT)
Dept: HEMATOLOGY/ONCOLOGY | Facility: CLINIC | Age: 57
End: 2022-06-13

## 2022-06-13 ENCOUNTER — OFFICE VISIT (OUTPATIENT)
Dept: HEMATOLOGY/ONCOLOGY | Facility: CLINIC | Age: 57
End: 2022-06-13
Payer: MEDICAID

## 2022-06-13 ENCOUNTER — CLINICAL SUPPORT (OUTPATIENT)
Dept: HEMATOLOGY/ONCOLOGY | Facility: CLINIC | Age: 57
End: 2022-06-13
Payer: MEDICAID

## 2022-06-13 VITALS
BODY MASS INDEX: 22.1 KG/M2 | OXYGEN SATURATION: 91 % | SYSTOLIC BLOOD PRESSURE: 151 MMHG | HEIGHT: 66 IN | WEIGHT: 137.5 LBS | HEART RATE: 104 BPM | RESPIRATION RATE: 18 BRPM | DIASTOLIC BLOOD PRESSURE: 90 MMHG

## 2022-06-13 DIAGNOSIS — R51.9 CHRONIC HEADACHE WITH NORMAL NEUROLOGIC EXAMINATION: ICD-10-CM

## 2022-06-13 DIAGNOSIS — G89.29 CHRONIC HEADACHE WITH NORMAL NEUROLOGIC EXAMINATION: ICD-10-CM

## 2022-06-13 DIAGNOSIS — R11.0 NAUSEA: ICD-10-CM

## 2022-06-13 DIAGNOSIS — F17.200 SMOKING ADDICTION: Primary | ICD-10-CM

## 2022-06-13 DIAGNOSIS — C34.90 MALIGNANT NEOPLASM OF UNSPECIFIED PART OF UNSPECIFIED BRONCHUS OR LUNG: ICD-10-CM

## 2022-06-13 DIAGNOSIS — C34.92 SMALL CELL LUNG CANCER, LEFT: ICD-10-CM

## 2022-06-13 DIAGNOSIS — C34.90 SMALL CELL LUNG CANCER: ICD-10-CM

## 2022-06-13 DIAGNOSIS — I10 HYPERTENSION, ESSENTIAL: ICD-10-CM

## 2022-06-13 LAB
ALBUMIN SERPL BCP-MCNC: 3.7 G/DL (ref 3.4–5)
ALBUMIN/GLOBULIN RATIO: 0.95 RATIO (ref 1.1–1.8)
ALP SERPL-CCNC: 128 U/L (ref 46–116)
ALT SERPL W P-5'-P-CCNC: 39 U/L (ref 12–78)
ANION GAP SERPL CALC-SCNC: 10 MMOL/L (ref 3–11)
AST SERPL-CCNC: 37 U/L (ref 15–37)
BASOPHILS NFR BLD: 0.7 % (ref 0–3)
BILIRUB SERPL-MCNC: 0.5 MG/DL (ref 0–1)
BUN SERPL-MCNC: 3 MG/DL (ref 7–18)
BUN/CREAT SERPL: 4.34 RATIO (ref 7–18)
CALCIUM SERPL-MCNC: 8.8 MG/DL (ref 8.8–10.5)
CHLORIDE SERPL-SCNC: 101 MMOL/L (ref 100–108)
CO2 SERPL-SCNC: 27 MMOL/L (ref 21–32)
CREAT SERPL-MCNC: 0.69 MG/DL (ref 0.55–1.02)
EOSINOPHIL NFR BLD: 3.1 % (ref 1–3)
ERYTHROCYTE [DISTWIDTH] IN BLOOD BY AUTOMATED COUNT: 14 % (ref 12.5–18)
GFR ESTIMATION: > 60
GLOBULIN: 3.9 G/DL (ref 2.3–3.5)
GLUCOSE SERPL-MCNC: 128 MG/DL (ref 70–110)
HCT VFR BLD AUTO: 43.6 % (ref 37–47)
HGB BLD-MCNC: 15 G/DL (ref 12–16)
LYMPHOCYTES NFR BLD: 17.9 % (ref 25–40)
MACROCYTES BLD QL SMEAR: NORMAL
MCH RBC QN AUTO: 37.1 PG (ref 27–31.2)
MCHC RBC AUTO-ENTMCNC: 34.4 G/DL (ref 31.8–35.4)
MCV RBC AUTO: 107.9 FL (ref 80–97)
MONOCYTES NFR BLD: 12.2 % (ref 1–15)
NEUTROPHILS # BLD AUTO: 4.87 10*3/UL (ref 1.8–7.7)
NEUTROPHILS NFR BLD: 65.6 % (ref 37–80)
NUCLEATED RED BLOOD CELLS: 0 %
PLATELETS: 265 10*3/UL (ref 142–424)
POTASSIUM SERPL-SCNC: 4.2 MMOL/L (ref 3.6–5.2)
PROT SERPL-MCNC: 7.6 G/DL (ref 6.4–8.2)
RBC # BLD AUTO: 4.04 10*6/UL (ref 4.2–5.4)
SODIUM BLD-SCNC: 138 MMOL/L (ref 135–145)
WBC # BLD: 7.4 10*3/UL (ref 4.6–10.2)

## 2022-06-13 PROCEDURE — 1159F MED LIST DOCD IN RCRD: CPT | Mod: CPTII,S$GLB,, | Performed by: NURSE PRACTITIONER

## 2022-06-13 PROCEDURE — 1159F PR MEDICATION LIST DOCUMENTED IN MEDICAL RECORD: ICD-10-PCS | Mod: CPTII,S$GLB,, | Performed by: NURSE PRACTITIONER

## 2022-06-13 PROCEDURE — 3080F PR MOST RECENT DIASTOLIC BLOOD PRESSURE >= 90 MM HG: ICD-10-PCS | Mod: CPTII,S$GLB,, | Performed by: NURSE PRACTITIONER

## 2022-06-13 PROCEDURE — 3077F SYST BP >= 140 MM HG: CPT | Mod: CPTII,S$GLB,, | Performed by: NURSE PRACTITIONER

## 2022-06-13 PROCEDURE — 3080F DIAST BP >= 90 MM HG: CPT | Mod: CPTII,S$GLB,, | Performed by: NURSE PRACTITIONER

## 2022-06-13 PROCEDURE — 99214 PR OFFICE/OUTPT VISIT, EST, LEVL IV, 30-39 MIN: ICD-10-PCS | Mod: S$GLB,,, | Performed by: NURSE PRACTITIONER

## 2022-06-13 PROCEDURE — 3077F PR MOST RECENT SYSTOLIC BLOOD PRESSURE >= 140 MM HG: ICD-10-PCS | Mod: CPTII,S$GLB,, | Performed by: NURSE PRACTITIONER

## 2022-06-13 PROCEDURE — 99214 OFFICE O/P EST MOD 30 MIN: CPT | Mod: S$GLB,,, | Performed by: NURSE PRACTITIONER

## 2022-06-13 PROCEDURE — 3008F BODY MASS INDEX DOCD: CPT | Mod: CPTII,S$GLB,, | Performed by: NURSE PRACTITIONER

## 2022-06-13 PROCEDURE — 3008F PR BODY MASS INDEX (BMI) DOCUMENTED: ICD-10-PCS | Mod: CPTII,S$GLB,, | Performed by: NURSE PRACTITIONER

## 2022-06-13 RX ORDER — ONDANSETRON 8 MG/1
8 TABLET, ORALLY DISINTEGRATING ORAL EVERY 6 HOURS PRN
Qty: 30 TABLET | Refills: 3 | Status: SHIPPED | OUTPATIENT
Start: 2022-06-13 | End: 2023-06-13

## 2022-06-13 NOTE — PROGRESS NOTES
MEDICAL ONCOLOGY FOLLOW UP CONSULTATION NOTE    Chief Complaint: Small Cell Lung Cancer    HPI: Patient is a 56 year old female current smoker who underwent screening CT scan chest by her PCP which showed a suspicious mass arising from the left lower lobe with bronchial obstruction highly suspicious for malignancy. She presents to our clinic today for further evaluation. Denies weight loss, activity and appetite changes. Does complain of cough which she attributes it to smoking and says it is chronic with no changes.     Ct Chest 2/10/22:  Left lower mass significantly decreased.  Measures 4.1 x 3.2 x 4.3 cm compared to pretreatment dimensions of 10 cm on CT scan September 27, 2021 and PET-CT on October 11, 2021.        CT scan w/o contrast: 9/27/2021    1.  Category 4x: Very suspicious lesion. Large soft tissue mass effect in   the left lower lobe associated with bronchial obstruction highly suspicious    for malignancy.      Chest CT with or without contrast, CT/PET and/or tissue sampling depending   on the probability of malignancy and comorbidities. Consultation with   pulmonology is also recommended.         PET scan: 10/11/2021  == Large left lower lobe hypermetabolic pass consistent with malignancy.  It has a maximum            diameter on the CT of approximately 10 cm it shows intense radiotracer uptake with the SUV being 17.7      DIAGNOSIS:   11/12/2021 BOWERS/kml   LUNG, LEFT LOWER LOBE, EBUS-FNA:   -- SMALL CELL CARCINOMA.   -- SEE COMMENT.   Comment:   A panel of immunohistochemical stains are performed on block 1A to further   characterize the neoplasm.  The tumor cells are positive for pancytokeratin,   CK7, CK8/18, and CD56.  The cells of interest are negative for P40,   synaptophysin, TTF1, CD3, CD20, CK20, and S100.  The CK7 demonstrates some   dot-like pattern expression.  Controls performed as expected.  The above   findings support the diagnosis of small cell carcinoma.             Labs:  Lab  Results   Component Value Date    WBC 4.9 03/14/2022    HGB 9.1 (L) 03/14/2022    HCT 26.7 (L) 03/14/2022    .2 (H) 03/14/2022    LABPLAT 263 03/14/2022      Platelets   Date Value Ref Range Status   03/14/2022 263 142 - 424 10*3/uL Final     CMP  Sodium   Date Value Ref Range Status   03/14/2022 140 135 - 145 mmol/L Final     Potassium   Date Value Ref Range Status   03/14/2022 3.8 3.6 - 5.2 mmol/L Final     Chloride   Date Value Ref Range Status   03/14/2022 102 100 - 108 mmol/L Final     CO2   Date Value Ref Range Status   03/14/2022 26 21 - 32 mmol/L Final     Glucose   Date Value Ref Range Status   03/14/2022 109 70 - 110 mg/dL Final     BUN   Date Value Ref Range Status   03/14/2022 6 (L) 7 - 18 mg/dL Final     Creatinine   Date Value Ref Range Status   03/14/2022 0.61 0.55 - 1.02 mg/dL Final     Calcium   Date Value Ref Range Status   03/14/2022 9.0 8.8 - 10.5 mg/dL Final     Total Protein   Date Value Ref Range Status   03/14/2022 7.1 6.4 - 8.2 g/dL Final     Albumin   Date Value Ref Range Status   03/14/2022 3.9 3.4 - 5.0 g/dL Final     Total Bilirubin   Date Value Ref Range Status   03/14/2022 0.4 0.0 - 1.0 mg/dL Final     Alkaline Phosphatase   Date Value Ref Range Status   03/14/2022 126 (H) 46 - 116 U/L Final     AST   Date Value Ref Range Status   03/14/2022 31 15 - 37 U/L Final     ALT   Date Value Ref Range Status   03/14/2022 40 12 - 78 U/L Final     Anion Gap   Date Value Ref Range Status   03/14/2022 12.0 (H) 3.0 - 11.0 mmol/L Final     eGFR if    Date Value Ref Range Status   09/17/2021 124 > OR = 60 mL/min/1.73m2 Final     eGFR if non    Date Value Ref Range Status   09/17/2021 107 > OR = 60 mL/min/1.73m2 Final            Past Medical History:   Diagnosis Date    Allergy     GERD (gastroesophageal reflux disease)     Hiatal hernia     Small cell lung cancer 11/15/2021     Family History   Problem Relation Age of Onset    Emphysema Mother     Diabetes  Mother     Diabetes Father     Alcohol abuse Father     No Known Problems Sister     No Known Problems Brother     No Known Problems Maternal Aunt     No Known Problems Maternal Uncle     No Known Problems Paternal Aunt     No Known Problems Paternal Uncle     No Known Problems Maternal Grandmother     No Known Problems Maternal Grandfather     No Known Problems Paternal Grandmother     No Known Problems Paternal Grandfather     No Known Problems Daughter      Social History     Socioeconomic History    Marital status:    Tobacco Use    Smoking status: Current Every Day Smoker     Packs/day: 0.50     Years: 40.00     Pack years: 20.00     Types: Cigarettes    Smokeless tobacco: Never Used   Substance and Sexual Activity    Alcohol use: Yes     Alcohol/week: 10.0 standard drinks     Types: 10 Cans of beer per week    Drug use: Never    Sexual activity: Yes     Partners: Male     Birth control/protection: None     Past Surgical History:   Procedure Laterality Date    COLONOSCOPY       in [place] and he did the dialation     ESOPHAGEAL DILATION      SPINAL FUSION      neck         Review of systems:  Review of Systems   Constitutional: Negative for activity change, appetite change, chills, diaphoresis, fatigue and unexpected weight change.   HENT: Negative for congestion, facial swelling, hearing loss, mouth sores, trouble swallowing and voice change.    Eyes: Negative for photophobia, pain, discharge and itching.   Respiratory: Negative for apnea, cough, choking, chest tightness, shortness of breath, wheezing and stridor.    Cardiovascular: Negative for chest pain, palpitations and leg swelling.   Gastrointestinal: Positive for nausea. Negative for abdominal distention, abdominal pain, anal bleeding and blood in stool.   Endocrine: Negative for cold intolerance, heat intolerance, polydipsia and polyphagia.   Genitourinary: Negative for difficulty urinating, dysuria, flank pain and  hematuria.   Musculoskeletal: Negative for arthralgias, back pain, joint swelling, myalgias, neck pain and neck stiffness.        +ve for joint pain   Skin: Positive for rash. Negative for color change, pallor and wound.   Allergic/Immunologic: Negative for environmental allergies, food allergies and immunocompromised state.   Neurological: Positive for headaches. Negative for dizziness, seizures, facial asymmetry, speech difficulty, light-headedness and numbness.   Hematological: Negative for adenopathy. Does not bruise/bleed easily.   Psychiatric/Behavioral: Negative for agitation, behavioral problems, confusion, decreased concentration and sleep disturbance.       Physical Exam  Vitals and nursing note reviewed.   Constitutional:       General: She is not in acute distress.     Appearance: Normal appearance. She is not ill-appearing.   HENT:      Head: Normocephalic and atraumatic.      Nose: No congestion or rhinorrhea.   Eyes:      General: No scleral icterus.     Extraocular Movements: Extraocular movements intact.      Pupils: Pupils are equal, round, and reactive to light.   Cardiovascular:      Rate and Rhythm: Normal rate and regular rhythm.      Pulses: Normal pulses.      Heart sounds: Normal heart sounds. No murmur heard.    No gallop.   Pulmonary:      Effort: Pulmonary effort is normal. No respiratory distress.      Breath sounds: Normal breath sounds. No stridor. No wheezing or rhonchi.      Comments: +ve for coarse breath sounds  Abdominal:      General: Bowel sounds are normal. There is no distension.      Palpations: There is no mass.      Tenderness: There is no abdominal tenderness. There is no guarding.   Musculoskeletal:         General: No swelling, tenderness, deformity or signs of injury. Normal range of motion.      Cervical back: Normal range of motion and neck supple. No rigidity. No muscular tenderness.      Right lower leg: No edema.      Left lower leg: No edema.   Skin:     General:  Skin is warm and dry.      Coloration: Skin is not jaundiced or pale.      Findings: Rash present. No bruising or lesion.   Neurological:      General: No focal deficit present.      Mental Status: She is alert and oriented to person, place, and time.      Cranial Nerves: No cranial nerve deficit.      Sensory: No sensory deficit.      Motor: No weakness.      Gait: Gait normal.   Psychiatric:         Mood and Affect: Mood normal.         Behavior: Behavior normal.         Thought Content: Thought content normal.       There were no vitals filed for this visit.There is no height or weight on file to calculate BSA.          Assessment/Plan:      ECOG 1    1. Limited Stage Small cell carcinoma: [id number], Stage IIIA    == Diagnosis via Left lower lobe EBUS: FNA.  == I do not feel resectability would be an option which is the preferred option per NCCN guidelines., looking at the size and location. No evidence of metastatic disease on imaging. However, on the last chest X-ray  she was noted to have suspected left sided effusion, this did not correlate with PET scan but obviously concerning.  She would like to seek a second opinion at [facility] and  I discussed this with her that while she does this, she should not delay start of her treatment considering the size of the mass. Our plan should be for concurrent chemoradiation. Referral for port placement placed. Patient has been discussed in [facility] before and it was decided to get EBUS FNA to get the diagnosis and to start treatment as soon as possible  11/23/21:  Patient in clinic today to discuss chemotherapy side effects, risks versus benefits, and expected outcomes.  Consents have been signed and all questions answered and medications have been sent to pharmacy.  Patient will tentatively begin chemotherapy next week once radiation is ready to begin.  Labs to be drawn today prior to beginning treatment next week.   12/21/21: patient states tolerated cycle 1 chemo well with  only occasional nausea. Well controlled with phenergan. Labs reviewed and pt cleared for cycle 2.   1/11/22:  Patient continues to tolerate chemotherapy very well.  She denies any nausea vomiting diarrhea or malaise.  She states she completes radiation on Thursday.  Labs reviewed and patient is cleared for cycle 3 chemotherapy is planned for today 3 Thursday.  She is requesting referral to smoking cessation which was placed today.   2/1/22:  Patient in clinic today with complaints of progressive fatigue, dyspnea noted when walking to her bathroom, and sleeping excessively.  Labs reviewed and hemoglobin 7.5 will plan to proceed with transfusion today and patient will continue with her chemo as planned.  She denies any chest pains or palpitations.  She is encouraged to reach out to clinic if her symptoms do not resolve in the next 1-2 days.  Otherwise we will see her as planned in 3 weeks prior to cycle 5.  2/22/22:  Patient continues to tolerate chemotherapy very well, she denies any chest palpitations shortness of breath at rest or excessive fatigue today.  Patient had recent CT chest for Dr. Lopez showing dramatic improvement in lung mass now 4.1 cm compared to 10 cm pretreatment.  Patient is to proceed with cycle 5 chemotherapy as planned for today.  She is to have an upcoming brain MRI and evaluation for prophylactic cranial irradiation with Dr. Lopez and after she completes chemotherapy.  Patient is aware that after completion of chemotherapy she will transition to immunotherapy for 1 year.  2/24/22:  Patient walked into clinic today due to complaints of new onset red itchy rash.  Rash is noted to her upper chest, arms, face and scalp.  She denies any shortness of breath difficulty breathing wheezing or stridor.  Patient received cycle 5 of carbo etoposide day 1 yesterday and had returned for cycle 5 day 2 of etoposide.  She states with cycle 4 she noticed a very small rash that was similar in nature but did not  report it to clinic or infusion.  Cycle 5 day 2 and 3 will be held at this time due to allergic reaction.  Atarax and Medrol Dosepak have been sent to pharmacy.  Patient is to go to ER if rash progresses, or she has any respiratory difficulties.  3/15/22:  Patient states allergic reaction resolved immediately after beginning Medrol Dosepak.  Plan is to increase premed Decadron to 20 mg prior to cycle cycle 6.  Additional Medrol Dosepak sent to pharmacy in the event patient has resumption of allergic reaction symptom of itching.  Patient is to complete cycle 6 of 6 chemotherapy this week.  She is scheduled for brain MRI prior to seeing Dr. Lopez in mid April.  We will request PET-CT to be done in early April as well.  4/13/22:   06/13/2022: Patient with limited stage small cell lung cancer completed Carboplatin/Etoposide on 3/17/2022. CT PET reviewed which was completed on 04/05/2022 showed partial response to treatment. She is also followed by Dr. Lopez who saw her on 5/27/2022 and completed prophylactic whole brain radiation. She  Is due for next ct chest, abd, pelvis as well as MRI brain. She developed headaches and nausea after XRT and was referred for to GI.         2. Current Smoker:    == Smoking cessation counseling done. She is trying to cut down, reports her  is seeing smoking cessation counselor tomorrow and they will try to quit together  ==Educated on high recurrence risk for small cell lung cancer with smoking    3. Hypertension  ==/90. Patient reports generally bp better than today, she was nervous regarding visit.   ==Instructed to take at home, keep log and to notify pcp if continues to be elevated  ==I will forward today's note to pcp (Dr. Robertson)    4. Nausea  ==Ondansetron refilled    To Do:  ==CT Chest, abd, pelvis  ==MRI Brain  ==CC: Dr. Robertson, Dr. Lopez        Total time spent in counseling and discussion about further management options including relevant lab work, treatment,   prognosis, medications and intended side effects was more than 25 minutes. More than 50% of the time was spent on counseling and coordination of care.  I spent a total of 60 minutes on the day of the visit.This includes face to face time and non-face to face time preparing to see the patient (eg, review of tests), Obtaining and/or reviewing separately obtained history, Documenting clinical information in the electronic or other health record, Independently interpreting resultsand communicating results to the patient/family/caregiver, or Care coordination.

## 2022-06-15 ENCOUNTER — PATIENT OUTREACH (OUTPATIENT)
Dept: ADMINISTRATIVE | Facility: HOSPITAL | Age: 57
End: 2022-06-15
Payer: MEDICAID

## 2022-06-15 NOTE — PROGRESS NOTES
Working gap report for HTN. Pt is not monitoring her BP at home. Pt is a smoker and she does understand the risks involved. HTN education performed with pt understanding expressed.

## 2022-06-17 ENCOUNTER — PATIENT OUTREACH (OUTPATIENT)
Dept: ADMINISTRATIVE | Facility: HOSPITAL | Age: 57
End: 2022-06-17
Payer: MEDICAID

## 2022-06-17 DIAGNOSIS — Z86.010 HISTORY OF COLON POLYPS: ICD-10-CM

## 2022-06-17 PROBLEM — Z86.0100 HISTORY OF COLON POLYPS: Status: ACTIVE | Noted: 2021-04-15

## 2022-06-17 NOTE — LETTER
June 17, 2022                 [facility]  [address]  [facility] [address]  Phone: [phone] June 17, 2022     Patient: Diana Prather    YOB: 1965   Date of Visit: 6/17/2022   To whom it may concern:     We are seeing Diana Prather, YOB: 1965, at [facility]. Mirian Robertson MD is their primary care physician. To help with our [facility] records could you please send the following:     Most recent Colonoscopy Result with recommendation to repeat procedure.     Please send fax to [phone], Attention Agueda RYAN LPN Care Coordinator    Thank-you in advance for your assistance. If you have any questions or concerns, please don't hesitate to contact me at [phone].    Agueda RYAN [facility]  Care Coordination Department  [facility]  Phone number: [phone]

## 2022-06-17 NOTE — PROGRESS NOTES
According to documentation found in EMR pt has a colonoscopy in 2020 with Dr. Craft at [facility]. Fax request sent for results.   Uploading and hyper-linking Colonoscopy/EGD with Pathology done 4.15.2021

## 2022-06-20 PROBLEM — F17.200 SMOKING ADDICTION: Status: ACTIVE | Noted: 2022-06-20

## 2022-06-20 NOTE — ADDENDUM NOTE
Addended by: RYAN ESPINOZA on: 6/20/2022 08:44 AM     Modules accepted: Orders, Level of Service, SmartSet

## 2022-06-21 ENCOUNTER — TELEPHONE (OUTPATIENT)
Dept: HEMATOLOGY/ONCOLOGY | Facility: CLINIC | Age: 57
End: 2022-06-21
Payer: MEDICAID

## 2022-06-23 ENCOUNTER — PATIENT MESSAGE (OUTPATIENT)
Dept: SMOKING CESSATION | Facility: CLINIC | Age: 57
End: 2022-06-23
Payer: MEDICAID

## 2022-07-01 DIAGNOSIS — C34.90 SMALL CELL LUNG CANCER: ICD-10-CM

## 2022-07-01 NOTE — TELEPHONE ENCOUNTER
----- Message from Keisha Hickman sent at 7/1/2022 12:46 PM CDT -----  Diana Prather would like for the nurse to give her a call back. She did not say what its regarding [phone] (home)

## 2022-07-05 RX ORDER — PROMETHAZINE HYDROCHLORIDE 25 MG/1
TABLET ORAL
Qty: 30 TABLET | Refills: 3 | OUTPATIENT
Start: 2022-07-05

## 2022-07-06 DIAGNOSIS — C34.90 SMALL CELL LUNG CANCER: ICD-10-CM

## 2022-07-07 DIAGNOSIS — C34.90 MALIGNANT NEOPLASM OF UNSPECIFIED PART OF UNSPECIFIED BRONCHUS OR LUNG: ICD-10-CM

## 2022-07-07 DIAGNOSIS — C34.90 SMALL CELL LUNG CANCER: Primary | ICD-10-CM

## 2022-07-07 RX ORDER — PROMETHAZINE HYDROCHLORIDE 25 MG/1
TABLET ORAL
Qty: 30 TABLET | Refills: 3 | OUTPATIENT
Start: 2022-07-07

## 2022-07-22 ENCOUNTER — OFFICE VISIT (OUTPATIENT)
Dept: HEMATOLOGY/ONCOLOGY | Facility: CLINIC | Age: 57
End: 2022-07-22
Payer: MEDICAID

## 2022-07-22 VITALS
OXYGEN SATURATION: 91 % | HEART RATE: 109 BPM | RESPIRATION RATE: 18 BRPM | HEIGHT: 66 IN | DIASTOLIC BLOOD PRESSURE: 84 MMHG | SYSTOLIC BLOOD PRESSURE: 136 MMHG | TEMPERATURE: 97 F | BODY MASS INDEX: 20.94 KG/M2 | WEIGHT: 130.31 LBS

## 2022-07-22 DIAGNOSIS — Z72.0 TOBACCO USE: ICD-10-CM

## 2022-07-22 DIAGNOSIS — R91.8 MASS OF LEFT LUNG: ICD-10-CM

## 2022-07-22 DIAGNOSIS — K57.92 DIVERTICULITIS: ICD-10-CM

## 2022-07-22 DIAGNOSIS — C34.90 SMALL CELL LUNG CANCER: Primary | ICD-10-CM

## 2022-07-22 DIAGNOSIS — R19.5 DARK STOOLS: ICD-10-CM

## 2022-07-22 PROCEDURE — 1159F PR MEDICATION LIST DOCUMENTED IN MEDICAL RECORD: ICD-10-PCS | Mod: CPTII,S$GLB,, | Performed by: NURSE PRACTITIONER

## 2022-07-22 PROCEDURE — 3079F PR MOST RECENT DIASTOLIC BLOOD PRESSURE 80-89 MM HG: ICD-10-PCS | Mod: CPTII,S$GLB,, | Performed by: NURSE PRACTITIONER

## 2022-07-22 PROCEDURE — 3008F PR BODY MASS INDEX (BMI) DOCUMENTED: ICD-10-PCS | Mod: CPTII,S$GLB,, | Performed by: NURSE PRACTITIONER

## 2022-07-22 PROCEDURE — 1160F PR REVIEW ALL MEDS BY PRESCRIBER/CLIN PHARMACIST DOCUMENTED: ICD-10-PCS | Mod: CPTII,S$GLB,, | Performed by: NURSE PRACTITIONER

## 2022-07-22 PROCEDURE — 1160F RVW MEDS BY RX/DR IN RCRD: CPT | Mod: CPTII,S$GLB,, | Performed by: NURSE PRACTITIONER

## 2022-07-22 PROCEDURE — 3075F SYST BP GE 130 - 139MM HG: CPT | Mod: CPTII,S$GLB,, | Performed by: NURSE PRACTITIONER

## 2022-07-22 PROCEDURE — 99214 OFFICE O/P EST MOD 30 MIN: CPT | Mod: S$GLB,,, | Performed by: NURSE PRACTITIONER

## 2022-07-22 PROCEDURE — 1159F MED LIST DOCD IN RCRD: CPT | Mod: CPTII,S$GLB,, | Performed by: NURSE PRACTITIONER

## 2022-07-22 PROCEDURE — 3008F BODY MASS INDEX DOCD: CPT | Mod: CPTII,S$GLB,, | Performed by: NURSE PRACTITIONER

## 2022-07-22 PROCEDURE — 3075F PR MOST RECENT SYSTOLIC BLOOD PRESS GE 130-139MM HG: ICD-10-PCS | Mod: CPTII,S$GLB,, | Performed by: NURSE PRACTITIONER

## 2022-07-22 PROCEDURE — 3079F DIAST BP 80-89 MM HG: CPT | Mod: CPTII,S$GLB,, | Performed by: NURSE PRACTITIONER

## 2022-07-22 PROCEDURE — 99214 PR OFFICE/OUTPT VISIT, EST, LEVL IV, 30-39 MIN: ICD-10-PCS | Mod: S$GLB,,, | Performed by: NURSE PRACTITIONER

## 2022-07-22 NOTE — PROGRESS NOTES
MEDICAL ONCOLOGY FOLLOW UP CONSULTATION NOTE    Chief Complaint: Small Cell Lung Cancer    HPI: Patient is a 56 year old female current smoker who underwent screening CT scan chest by her PCP which showed a suspicious mass arising from the left lower lobe with bronchial obstruction highly suspicious for malignancy. She presents to our clinic today for further evaluation. Denies weight loss, activity and appetite changes. Does complain of cough which she attributes it to smoking and says it is chronic with no changes.     Ct Chest 2/10/22:  Left lower mass significantly decreased.  Measures 4.1 x 3.2 x 4.3 cm compared to pretreatment dimensions of 10 cm on CT scan September 27, 2021 and PET-CT on October 11, 2021.        CT scan w/o contrast: 9/27/2021    1.  Category 4x: Very suspicious lesion. Large soft tissue mass effect in   the left lower lobe associated with bronchial obstruction highly suspicious    for malignancy.      Chest CT with or without contrast, CT/PET and/or tissue sampling depending   on the probability of malignancy and comorbidities. Consultation with   pulmonology is also recommended.         PET scan: 10/11/2021  == Large left lower lobe hypermetabolic pass consistent with malignancy.  It has a maximum            diameter on the CT of approximately 10 cm it shows intense radiotracer uptake with the SUV being 17.7      DIAGNOSIS:   11/12/2021 BOWERS/kml   LUNG, LEFT LOWER LOBE, EBUS-FNA:   -- SMALL CELL CARCINOMA.   -- SEE COMMENT.   Comment:   A panel of immunohistochemical stains are performed on block 1A to further   characterize the neoplasm.  The tumor cells are positive for pancytokeratin,   CK7, CK8/18, and CD56.  The cells of interest are negative for P40,   synaptophysin, TTF1, CD3, CD20, CK20, and S100.  The CK7 demonstrates some   dot-like pattern expression.  Controls performed as expected.  The above   findings support the diagnosis of small cell carcinoma.             Labs:  Lab  Results   Component Value Date    WBC 7.4 06/13/2022    HGB 15.0 06/13/2022    HCT 43.6 06/13/2022    .9 (H) 06/13/2022    LABPLAT 265 06/13/2022      Platelets   Date Value Ref Range Status   06/13/2022 265 142 - 424 10*3/uL Final     CMP  Sodium   Date Value Ref Range Status   06/13/2022 138 135 - 145 mmol/L Final     Potassium   Date Value Ref Range Status   06/13/2022 4.2 3.6 - 5.2 mmol/L Final     Chloride   Date Value Ref Range Status   06/13/2022 101 100 - 108 mmol/L Final     CO2   Date Value Ref Range Status   06/13/2022 27 21 - 32 mmol/L Final     Glucose   Date Value Ref Range Status   06/13/2022 128 (H) 70 - 110 mg/dL Final     BUN   Date Value Ref Range Status   06/13/2022 3 (L) 7 - 18 mg/dL Final     Creatinine   Date Value Ref Range Status   06/13/2022 0.69 0.55 - 1.02 mg/dL Final     Calcium   Date Value Ref Range Status   06/13/2022 8.8 8.8 - 10.5 mg/dL Final     Total Protein   Date Value Ref Range Status   06/13/2022 7.6 6.4 - 8.2 g/dL Final     Albumin   Date Value Ref Range Status   06/13/2022 3.7 3.4 - 5.0 g/dL Final     Total Bilirubin   Date Value Ref Range Status   06/13/2022 0.5 0.0 - 1.0 mg/dL Final     Alkaline Phosphatase   Date Value Ref Range Status   06/13/2022 128 (H) 46 - 116 U/L Final     AST   Date Value Ref Range Status   06/13/2022 37 15 - 37 U/L Final     ALT   Date Value Ref Range Status   06/13/2022 39 12 - 78 U/L Final     Anion Gap   Date Value Ref Range Status   06/13/2022 10.0 3.0 - 11.0 mmol/L Final     eGFR if    Date Value Ref Range Status   09/17/2021 124 > OR = 60 mL/min/1.73m2 Final     eGFR if non    Date Value Ref Range Status   09/17/2021 107 > OR = 60 mL/min/1.73m2 Final            Past Medical History:   Diagnosis Date    Allergy     GERD (gastroesophageal reflux disease)     Hiatal hernia     Small cell lung cancer 11/15/2021     Family History   Problem Relation Age of Onset    Emphysema Mother     Diabetes Mother      Diabetes Father     Alcohol abuse Father     No Known Problems Sister     No Known Problems Brother     No Known Problems Maternal Aunt     No Known Problems Maternal Uncle     No Known Problems Paternal Aunt     No Known Problems Paternal Uncle     No Known Problems Maternal Grandmother     No Known Problems Maternal Grandfather     No Known Problems Paternal Grandmother     No Known Problems Paternal Grandfather     No Known Problems Daughter      Social History     Socioeconomic History    Marital status:    Tobacco Use    Smoking status: Current Every Day Smoker     Packs/day: 0.50     Years: 40.00     Pack years: 20.00     Types: Cigarettes    Smokeless tobacco: Never Used   Substance and Sexual Activity    Alcohol use: Yes     Alcohol/week: 10.0 standard drinks     Types: 10 Cans of beer per week    Drug use: Never    Sexual activity: Yes     Partners: Male     Birth control/protection: None     Past Surgical History:   Procedure Laterality Date    COLONOSCOPY       in [place] and he did the dialation     ESOPHAGEAL DILATION      SPINAL FUSION      neck         Review of systems:  Review of Systems   Constitutional: Negative for activity change, appetite change, chills, diaphoresis, fatigue and unexpected weight change.   HENT: Negative for congestion, facial swelling, hearing loss, mouth sores, trouble swallowing and voice change.    Eyes: Negative for photophobia, pain, discharge and itching.   Respiratory: Negative for apnea, cough, choking, chest tightness, shortness of breath, wheezing and stridor.    Cardiovascular: Negative for chest pain, palpitations and leg swelling.   Gastrointestinal: Positive for nausea. Negative for abdominal distention, abdominal pain, anal bleeding and blood in stool.   Endocrine: Negative for cold intolerance, heat intolerance, polydipsia and polyphagia.   Genitourinary: Negative for difficulty urinating, dysuria, flank pain and  hematuria.   Musculoskeletal: Negative for arthralgias, back pain, joint swelling, myalgias, neck pain and neck stiffness.        +ve for joint pain   Skin: Positive for rash. Negative for color change, pallor and wound.   Allergic/Immunologic: Negative for environmental allergies, food allergies and immunocompromised state.   Neurological: Positive for headaches. Negative for dizziness, seizures, facial asymmetry, speech difficulty, light-headedness and numbness.   Hematological: Negative for adenopathy. Does not bruise/bleed easily.   Psychiatric/Behavioral: Negative for agitation, behavioral problems, confusion, decreased concentration and sleep disturbance.       Physical Exam  Vitals and nursing note reviewed.   Constitutional:       General: She is not in acute distress.     Appearance: Normal appearance. She is not ill-appearing.   HENT:      Head: Normocephalic and atraumatic.      Nose: No congestion or rhinorrhea.   Eyes:      General: No scleral icterus.     Extraocular Movements: Extraocular movements intact.      Pupils: Pupils are equal, round, and reactive to light.   Cardiovascular:      Rate and Rhythm: Normal rate and regular rhythm.      Pulses: Normal pulses.      Heart sounds: Normal heart sounds. No murmur heard.    No gallop.   Pulmonary:      Effort: Pulmonary effort is normal. No respiratory distress.      Breath sounds: Normal breath sounds. No stridor. No wheezing or rhonchi.      Comments: +ve for coarse breath sounds  Abdominal:      General: Bowel sounds are normal. There is no distension.      Palpations: There is no mass.      Tenderness: There is no abdominal tenderness. There is no guarding.   Musculoskeletal:         General: No swelling, tenderness, deformity or signs of injury. Normal range of motion.      Cervical back: Normal range of motion and neck supple. No rigidity. No muscular tenderness.      Right lower leg: No edema.      Left lower leg: No edema.   Skin:     General:  Skin is warm and dry.      Coloration: Skin is not jaundiced or pale.      Findings: Rash present. No bruising or lesion.   Neurological:      General: No focal deficit present.      Mental Status: She is alert and oriented to person, place, and time.      Cranial Nerves: No cranial nerve deficit.      Sensory: No sensory deficit.      Motor: No weakness.      Gait: Gait normal.   Psychiatric:         Mood and Affect: Mood normal.         Behavior: Behavior normal.         Thought Content: Thought content normal.       There were no vitals filed for this visit.There is no height or weight on file to calculate BSA.  Imaging:  Ct c/a/p 22:  CT Abdomen Pelvis  Without Contrast                                RADIOLOGY REPORT        PT NAME: PATRICIA BASHIR [facility]     : 1965 F 56             [address]    ACCT: [id number]                                              [place] Rec #: [id number]                                        [id number]    Patient Location: [facility]             Procedure: CT ABD   PELVIS WO/W CONTRAST    REQUISITION #: [id number]      REPORT #: [id number]           DATE OF EXAM: 22    TIME OF EXAM: 1000       CMS MANDATED QUALITY DATA-CT radiation-436        All CT scans at this facility utilize dose modulation, iterative   reconstruction, and/or weight based dosing when appropriate to reduce   radiation dose to as low as reasonably achievable.            CT Abdomen and pelvis without and with contrast        Clinical History:    Follow-up small cell lung cancer.        Technique:    Acquisition: Contiguous scan slices were obtained from the top of the   hemidiaphragm through the pelvis.    Reconstructions: Axial, coronal and sagittal reconstructions.         Contrast:    IV contrast: 100 cc Isovue 300    Oral contrast: Given    Phases: Pre and postcontrast        Estimated radiation dose: 8.1 mSv.    All CT scans at this facility use dose  modulation, iterative reconstruction    and/or weight based dosing when appropriate to reduce radiation dose to as   low as reasonably achievable.        Limitations: None        Comparison:    No prior relevant studies are available at this time.        Findings:    Lower chest: Normal.        Abdomen:    Liver: Fatty liver.        Spleen: Normal.        Gallbladder and biliary tree: Normal.        Pancreas: Normal.        Adrenal glands: Normal.        Urinary tract:    Kidneys: Normal.    Pelvocalyceal systems: Normal. No hydronephrosis.    Ureters: Normal. No hydroureter        Retroperitoneum: Normal.        Mesentery and gastrointestinal tract: There are some colonic diverticuli.   Mild pericolonic stranding adjacent to the mid descending colon. Correlate   clinically for mild diverticulitis. No obstruction. No fluid collection. No    free air.        Pelvis:    Urinary bladder: Normal.    Anteverted uterus. There are fallopian tube closure implants bilaterally.    Inguinal regions: Normal.        Vasculature: Scattered arterial calcific atherosclerotic changes.        Osseous structures: DJD. L5 spondylolysis.        Abdominal wall: Normal.        Additional findings: None seen.        Impression:        1.  Fatty liver.        2.  Mild stranding adjacent to the mid descending colon. Correlate   clinically for mild diverticulitis.        3.  Otherwise negative. Notes of metastatic malignancy.        DICTATING PHYSICIAN:  SAKINA ZABALA MD                   Date Dictated: 07/11/22 1108        Signed By:  SAKINA ZABALA MD <Electronically signed by SAKINA ZABALA MD in OV>    Date Signed:  07/11/22 1112     CC: SARA JAUREGUI NP ; SARA JAUREGUI NP      ADMITTING PHYSICIAN:                                                                                                    ORDERING PHY: SARA JAUREGUI NP                                                                                                                                                       ATTENDING PHY: SARA JAUREGUI NP    Patient Status:  REG CLI    Admit Service Date: 22       [facility] UNKNOWN RAD EAP                                RADIOLOGY REPORT        PT NAME: PATRICIA BASHIR [facility]     : 1965 F 56             [address]    ACCT: [id number]                                              [facility] Rec #: [id number]                                        [id number]    Patient Location: [facility]             Procedure: CHEST THORAX  WO/W CONT    REQUISITION #: [id number]      REPORT #: [id number]           DATE OF EXAM: 22    TIME OF EXAM: 1045       CMS MANDATED QUALITY DATA-CT radiation-436        All CT scans at this facility utilize dose modulation, iterative   reconstruction, and/or weight based dosing when appropriate to reduce   radiation dose to as low as reasonably achievable.        CT chest with and without contrast        Clinical data:    Small cell lung cancer follow-up        Technique:    Acquisition: Contiguous scan slices were obtained from the apex of the lungs   through the diaphragms.    Reconstructions: Axial, coronal and sagittal. reconstructions of the data   set were obtained.    Contrast:    IV: 100 cc of Isovue 300 was intravenously administered for the examination.    Other: None administered.    Phases: Pre and postcontrast    Estimated radiation dose: 1.9 mSv.    All CT scans at this facility use dose modulation, iterative reconstruction    and/or weight based dosing when appropriate to reduce radiation dose to as   low as reasonably achievable.        Limitations: The images appear technically satisfactory.        Comparison:    February 10, 2022        Findings:    Lungs, pleura and large airways: No left lower lobe infrahilar mass   measuring up to about 3 cm without significant change. There is associated   adjacent left perihilar patchy airspace and  interstitial infiltration as   well involving the upper and lower lobe with some increase from prior exam.    Left hemidiaphragm elevation. Trace left pleural effusion.        Heart: Normal.        Thoracic vasculature: Moderate coronary artery calcification.        Pulmonary vasculature: Normal.        Mediastinal and hilar structures: Normal.        Chest wall, axilla and lower neck: Left side central venous port.        Upper abdomen: Fatty liver.        Osseous structures: DJD.        Additional findings: None seen.        Impression        1.  Left lower lobe mass is not grossly changed. There is generalized   increase in the left perihilar infiltrate from prior exam. Follow-up   suggested.        DICTATING PHYSICIAN:  SAKINA ZABALA MD                   Date Dictated: 07/11/22 1100        Signed By:  SAKINA ZABALA MD <Electronically signed by SAKINA ZABALA MD in OV>    Date Signed:  07/11/22 1107     CC: SARA JAUREGUI NP ; SARA JAUREGUI NP      ADMITTING PHYSICIAN:                                                                                                    ORDERING PHY: SARA JAUREGUI NP                                                                                                                                                      ATTENDING PHY: SARA JAUREGUI NP    Patient Status:  REG CLI    Admit Service Date: 07/11/22           MRI Brain 7/5/22:        Assessment/Plan:      ECOG 1    1. Limited Stage Small cell carcinoma: T4N0M0, Stage IIIA    == Diagnosis via Left lower lobe EBUS: FNA.  == I do not feel resectability would be an option which is the preferred option per NCCN guidelines., looking at the size and location. No evidence of metastatic disease on imaging. However, on the last chest X-ray  she was noted to have suspected left sided effusion, this did not correlate with PET scan but obviously concerning.  She would like to seek a second opinion at [facility]  and  I discussed this with her that while she does this, she should not delay start of her treatment considering the size of the mass. Our plan should be for concurrent chemoradiation. Referral for port placement placed. Patient has been discussed in [facility] before and it was decided to get EBUS FNA to get the diagnosis and to start treatment as soon as possible  11/23/21:  Patient in clinic today to discuss chemotherapy side effects, risks versus benefits, and expected outcomes.  Consents have been signed and all questions answered and medications have been sent to pharmacy.  Patient will tentatively begin chemotherapy next week once radiation is ready to begin.  Labs to be drawn today prior to beginning treatment next week.   12/21/21: patient states tolerated cycle 1 chemo well with only occasional nausea. Well controlled with phenergan. Labs reviewed and pt cleared for cycle 2.   1/11/22:  Patient continues to tolerate chemotherapy very well.  She denies any nausea vomiting diarrhea or malaise.  She states she completes radiation on Thursday.  Labs reviewed and patient is cleared for cycle 3 chemotherapy is planned for today 3 Thursday.  She is requesting referral to smoking cessation which was placed today.   2/1/22:  Patient in clinic today with complaints of progressive fatigue, dyspnea noted when walking to her bathroom, and sleeping excessively.  Labs reviewed and hemoglobin 7.5 will plan to proceed with transfusion today and patient will continue with her chemo as planned.  She denies any chest pains or palpitations.  She is encouraged to reach out to clinic if her symptoms do not resolve in the next 1-2 days.  Otherwise we will see her as planned in 3 weeks prior to cycle 5.  2/22/22:  Patient continues to tolerate chemotherapy very well, she denies any chest palpitations shortness of breath at rest or excessive fatigue today.  Patient had recent CT chest for Dr. Lopez showing dramatic improvement in lung  mass now 4.1 cm compared to 10 cm pretreatment.  Patient is to proceed with cycle 5 chemotherapy as planned for today.  She is to have an upcoming brain MRI and evaluation for prophylactic cranial irradiation with Dr. Lopez and after she completes chemotherapy.  Patient is aware that after completion of chemotherapy she will transition to immunotherapy for 1 year.  2/24/22:  Patient walked into clinic today due to complaints of new onset red itchy rash.  Rash is noted to her upper chest, arms, face and scalp.  She denies any shortness of breath difficulty breathing wheezing or stridor.  Patient received cycle 5 of carbo etoposide day 1 yesterday and had returned for cycle 5 day 2 of etoposide.  She states with cycle 4 she noticed a very small rash that was similar in nature but did not report it to clinic or infusion.  Cycle 5 day 2 and 3 will be held at this time due to allergic reaction.  Atarax and Medrol Dosepak have been sent to pharmacy.  Patient is to go to ER if rash progresses, or she has any respiratory difficulties.  3/15/22:  Patient states allergic reaction resolved immediately after beginning Medrol Dosepak.  Plan is to increase premed Decadron to 20 mg prior to cycle cycle 6.  Additional Medrol Dosepak sent to pharmacy in the event patient has resumption of allergic reaction symptom of itching.  Patient is to complete cycle 6 of 6 chemotherapy this week.  She is scheduled for brain MRI prior to seeing Dr. Lopez in mid April.  We will request PET-CT to be done in early April as well.  4/13/22:   06/13/2022: Patient with limited stage small cell lung cancer completed Carboplatin/Etoposide on 3/17/2022. CT PET reviewed which was completed on 04/05/2022 showed partial response to treatment. She is also followed by Dr. Lopez who saw her on 5/27/2022 and completed prophylactic whole brain radiation. She  Is due for next ct chest, abd, pelvis as well as MRI brain. She developed headaches and nausea after XRT  and was referred for to GI.   7/22/22: review of recent CT C/A/P shows stable disease with a 3 cm LLL mass noted with no significant change as compared to 2/2022, but questionable diverticulitis noted she c/o dark stools daily and has an appt in [place] on 7/26/22 with Dr Birmingham. Previously referred to GI. MRI Brain shows RADHA.       2. Current Smoker:    == Smoking cessation counseling done. She is trying to cut down, reports her  is seeing smoking cessation counselor tomorrow and they will try to quit together  ==Educated on high recurrence risk for small cell lung cancer with smoking    3. Hypertension  ==/90. Patient reports generally bp better than today, she was nervous regarding visit.   ==Instructed to take at home, keep log and to notify pcp if continues to be elevated  ==I will forward today's note to pcp (Dr. Robertson)    4. Nausea/dark tarry stools   ==Ondansetron refilled  ==she has a GI eval next week with Dr Birmingham in [place]    To Do:  ==cbc cmp fobt today  ==RTC in 2 months         Total time spent in counseling and discussion about further management options including relevant lab work, treatment,  prognosis, medications and intended side effects was more than 35 minutes. More than 50% of the time was spent on counseling and coordination of care.  I spent a total of 35 minutes on the day of the visit.This includes face to face time and non-face to face time preparing to see the patient (eg, review of tests), Obtaining and/or reviewing separately obtained history, Documenting clinical information in the electronic or other health record, Independently interpreting resultsand communicating results to the patient/family/caregiver, or Care coordination.

## 2022-07-28 DIAGNOSIS — I10 HYPERTENSION, UNSPECIFIED TYPE: ICD-10-CM

## 2022-07-28 NOTE — TELEPHONE ENCOUNTER
"----- Message from Yue Mac sent at 7/28/2022  2:25 PM CDT -----  Contact: SELF  Type:  RX Refill Request    Who Called:  Diana Prather   Refill or New Rx: REFILL  RX Name and Strength: nortriptyline (PAMELOR) 50 MG capsule  How is the patient currently taking it? (ex. 1XDay): 1 NIGHTLY  Is this a 30 day or 90 day RX: 30 DAY  Preferred Pharmacy with phone number:   [facility] - [address] [place] [id number]  Phone: [phone] Fax: [phone]    Local or Mail Order: LOCAL  Ordering Provider: NANO BOYKIN  Would the patient rather a call back or a response via [facility]?  CALL BACK   Best Call Back Number: [phone]   Additional Information: N/A        "

## 2022-07-29 DIAGNOSIS — I10 HYPERTENSION, UNSPECIFIED TYPE: ICD-10-CM

## 2022-07-29 RX ORDER — NORTRIPTYLINE HYDROCHLORIDE 50 MG/1
CAPSULE ORAL
Qty: 30 CAPSULE | Refills: 3 | Status: SHIPPED | OUTPATIENT
Start: 2022-07-29 | End: 2022-07-29 | Stop reason: SDUPTHER

## 2022-07-29 RX ORDER — NORTRIPTYLINE HYDROCHLORIDE 50 MG/1
CAPSULE ORAL
Qty: 30 CAPSULE | Refills: 3 | Status: SHIPPED | OUTPATIENT
Start: 2022-07-29 | End: 2022-11-25 | Stop reason: ALTCHOICE

## 2022-07-29 RX ORDER — NORTRIPTYLINE HYDROCHLORIDE 50 MG/1
CAPSULE ORAL
Qty: 30 CAPSULE | Refills: 3 | Status: SHIPPED | OUTPATIENT
Start: 2022-07-29 | End: 2022-07-29

## 2022-07-29 NOTE — TELEPHONE ENCOUNTER
----- Message from Vivien Dougherty sent at 7/29/2022  3:13 PM CDT -----  please send nortriptyline refill to [facility] instead..[phone] (home)

## 2022-08-02 NOTE — TELEPHONE ENCOUNTER
E-Prescribed Message Needing Your Attention  Received: 4 days ago  Interface, Surescripts Out  P [facility] Staff  An error occurred while processing the e-prescribing message.     The message was not sent electronically to the requested pharmacy. Contact the pharmacy about the new prescription.

## 2022-09-16 ENCOUNTER — TELEPHONE (OUTPATIENT)
Dept: HEMATOLOGY/ONCOLOGY | Facility: CLINIC | Age: 57
End: 2022-09-16

## 2022-09-16 ENCOUNTER — OFFICE VISIT (OUTPATIENT)
Dept: HEMATOLOGY/ONCOLOGY | Facility: CLINIC | Age: 57
End: 2022-09-16
Payer: MEDICAID

## 2022-09-16 VITALS
HEIGHT: 66 IN | DIASTOLIC BLOOD PRESSURE: 87 MMHG | TEMPERATURE: 97 F | WEIGHT: 127.13 LBS | SYSTOLIC BLOOD PRESSURE: 135 MMHG | HEART RATE: 114 BPM | RESPIRATION RATE: 20 BRPM | BODY MASS INDEX: 20.43 KG/M2

## 2022-09-16 DIAGNOSIS — R06.09 DOE (DYSPNEA ON EXERTION): Primary | ICD-10-CM

## 2022-09-16 DIAGNOSIS — C34.90 SMALL CELL LUNG CANCER: ICD-10-CM

## 2022-09-16 PROCEDURE — 1159F PR MEDICATION LIST DOCUMENTED IN MEDICAL RECORD: ICD-10-PCS | Mod: CPTII,S$GLB,, | Performed by: NURSE PRACTITIONER

## 2022-09-16 PROCEDURE — 99214 PR OFFICE/OUTPT VISIT, EST, LEVL IV, 30-39 MIN: ICD-10-PCS | Mod: S$GLB,,, | Performed by: NURSE PRACTITIONER

## 2022-09-16 PROCEDURE — 3075F SYST BP GE 130 - 139MM HG: CPT | Mod: CPTII,S$GLB,, | Performed by: NURSE PRACTITIONER

## 2022-09-16 PROCEDURE — 3008F BODY MASS INDEX DOCD: CPT | Mod: CPTII,S$GLB,, | Performed by: NURSE PRACTITIONER

## 2022-09-16 PROCEDURE — 3079F DIAST BP 80-89 MM HG: CPT | Mod: CPTII,S$GLB,, | Performed by: NURSE PRACTITIONER

## 2022-09-16 PROCEDURE — 1159F MED LIST DOCD IN RCRD: CPT | Mod: CPTII,S$GLB,, | Performed by: NURSE PRACTITIONER

## 2022-09-16 PROCEDURE — 3008F PR BODY MASS INDEX (BMI) DOCUMENTED: ICD-10-PCS | Mod: CPTII,S$GLB,, | Performed by: NURSE PRACTITIONER

## 2022-09-16 PROCEDURE — 99214 OFFICE O/P EST MOD 30 MIN: CPT | Mod: S$GLB,,, | Performed by: NURSE PRACTITIONER

## 2022-09-16 PROCEDURE — 3075F PR MOST RECENT SYSTOLIC BLOOD PRESS GE 130-139MM HG: ICD-10-PCS | Mod: CPTII,S$GLB,, | Performed by: NURSE PRACTITIONER

## 2022-09-16 PROCEDURE — 3079F PR MOST RECENT DIASTOLIC BLOOD PRESSURE 80-89 MM HG: ICD-10-PCS | Mod: CPTII,S$GLB,, | Performed by: NURSE PRACTITIONER

## 2022-09-16 NOTE — TELEPHONE ENCOUNTER
Faxed patients PET scan order with face sheet, recent radiology results and last PET scan result along with office note from visit today to [facility].     Also faxed patient Chest X-ray order to central scheduling. KB LPN.

## 2022-09-16 NOTE — PROGRESS NOTES
MEDICAL ONCOLOGY FOLLOW UP CONSULTATION NOTE    Chief Complaint: Small Cell Lung Cancer    HPI: Patient is a 56 year old female current smoker who underwent screening CT scan chest by her PCP which showed a suspicious mass arising from the left lower lobe with bronchial obstruction highly suspicious for malignancy. She presents to our clinic today for further evaluation. Denies weight loss, activity and appetite changes. Does complain of cough which she attributes it to smoking and says it is chronic with no changes.     Ct Chest 2/10/22:  Left lower mass significantly decreased.  Measures 4.1 x 3.2 x 4.3 cm compared to pretreatment dimensions of 10 cm on CT scan September 27, 2021 and PET-CT on October 11, 2021.        CT scan w/o contrast: 9/27/2021    1.  Category 4x: Very suspicious lesion. Large soft tissue mass effect in   the left lower lobe associated with bronchial obstruction highly suspicious    for malignancy.      Chest CT with or without contrast, CT/PET and/or tissue sampling depending   on the probability of malignancy and comorbidities. Consultation with   pulmonology is also recommended.         PET scan: 10/11/2021  == Large left lower lobe hypermetabolic pass consistent with malignancy.  It has a maximum            diameter on the CT of approximately 10 cm it shows intense radiotracer uptake with the SUV being 17.7      DIAGNOSIS:   11/12/2021 BOWERS/kml   LUNG, LEFT LOWER LOBE, EBUS-FNA:   -- SMALL CELL CARCINOMA.   -- SEE COMMENT.   Comment:   A panel of immunohistochemical stains are performed on block 1A to further   characterize the neoplasm.  The tumor cells are positive for pancytokeratin,   CK7, CK8/18, and CD56.  The cells of interest are negative for P40,   synaptophysin, TTF1, CD3, CD20, CK20, and S100.  The CK7 demonstrates some   dot-like pattern expression.  Controls performed as expected.  The above   findings support the diagnosis of small cell carcinoma.             Labs:  Lab  Results   Component Value Date    WBC 7.4 06/13/2022    HGB 15.0 06/13/2022    HCT 43.6 06/13/2022    .9 (H) 06/13/2022    LABPLAT 265 06/13/2022      Platelets   Date Value Ref Range Status   06/13/2022 265 142 - 424 10*3/uL Final     CMP  Sodium   Date Value Ref Range Status   06/13/2022 138 135 - 145 mmol/L Final     Potassium   Date Value Ref Range Status   06/13/2022 4.2 3.6 - 5.2 mmol/L Final     Chloride   Date Value Ref Range Status   06/13/2022 101 100 - 108 mmol/L Final     CO2   Date Value Ref Range Status   06/13/2022 27 21 - 32 mmol/L Final     Glucose   Date Value Ref Range Status   06/13/2022 128 (H) 70 - 110 mg/dL Final     BUN   Date Value Ref Range Status   06/13/2022 3 (L) 7 - 18 mg/dL Final     Creatinine   Date Value Ref Range Status   06/13/2022 0.69 0.55 - 1.02 mg/dL Final     Calcium   Date Value Ref Range Status   06/13/2022 8.8 8.8 - 10.5 mg/dL Final     Total Protein   Date Value Ref Range Status   06/13/2022 7.6 6.4 - 8.2 g/dL Final     Albumin   Date Value Ref Range Status   06/13/2022 3.7 3.4 - 5.0 g/dL Final     Total Bilirubin   Date Value Ref Range Status   06/13/2022 0.5 0.0 - 1.0 mg/dL Final     Alkaline Phosphatase   Date Value Ref Range Status   06/13/2022 128 (H) 46 - 116 U/L Final     AST   Date Value Ref Range Status   06/13/2022 37 15 - 37 U/L Final     ALT   Date Value Ref Range Status   06/13/2022 39 12 - 78 U/L Final     Anion Gap   Date Value Ref Range Status   06/13/2022 10.0 3.0 - 11.0 mmol/L Final     eGFR if    Date Value Ref Range Status   09/17/2021 124 > OR = 60 mL/min/1.73m2 Final     eGFR if non    Date Value Ref Range Status   09/17/2021 107 > OR = 60 mL/min/1.73m2 Final            Past Medical History:   Diagnosis Date    Allergy     Esophagitis     Fatty liver 08/29/2022    Diagnosed by Ultrasound which was order by Dr. Castillo.    GERD (gastroesophageal reflux disease)     Hiatal hernia     Schatzki's ring of distal  esophagus     Small cell lung cancer 11/15/2021     Family History   Problem Relation Age of Onset    Emphysema Mother     Diabetes Mother     Diabetes Father     Alcohol abuse Father     No Known Problems Sister     No Known Problems Brother     No Known Problems Maternal Aunt     No Known Problems Maternal Uncle     No Known Problems Paternal Aunt     No Known Problems Paternal Uncle     No Known Problems Maternal Grandmother     No Known Problems Maternal Grandfather     No Known Problems Paternal Grandmother     No Known Problems Paternal Grandfather     No Known Problems Daughter      Social History     Socioeconomic History    Marital status:    Tobacco Use    Smoking status: Every Day     Packs/day: 0.50     Years: 40.00     Pack years: 20.00     Types: Cigarettes    Smokeless tobacco: Never   Substance and Sexual Activity    Alcohol use: Yes     Alcohol/week: 10.0 standard drinks     Types: 10 Cans of beer per week    Drug use: Never    Sexual activity: Yes     Partners: Male     Birth control/protection: None     Past Surgical History:   Procedure Laterality Date    COLONOSCOPY       in [place] and he did the dialation     COLONOSCOPY  08/17/2022    ESOPHAGEAL DILATION      ESOPHAGOGASTRODUODENOSCOPY  08/10/2022    SPINAL FUSION      neck         Review of systems:  Review of Systems   Constitutional:  Negative for activity change, appetite change, chills, diaphoresis, fatigue and unexpected weight change.   HENT:  Negative for congestion, facial swelling, hearing loss, mouth sores, trouble swallowing and voice change.    Eyes:  Negative for photophobia, pain, discharge and itching.   Respiratory:  Negative for apnea, cough, choking, chest tightness, shortness of breath, wheezing and stridor.    Cardiovascular:  Negative for chest pain, palpitations and leg swelling.   Gastrointestinal:  Positive for nausea. Negative for abdominal distention, abdominal pain, anal bleeding and blood in stool.    Endocrine: Negative for cold intolerance, heat intolerance, polydipsia and polyphagia.   Genitourinary:  Negative for difficulty urinating, dysuria, flank pain and hematuria.   Musculoskeletal:  Negative for arthralgias, back pain, joint swelling, myalgias, neck pain and neck stiffness.        +ve for joint pain   Skin:  Positive for rash. Negative for color change, pallor and wound.   Allergic/Immunologic: Negative for environmental allergies, food allergies and immunocompromised state.   Neurological:  Positive for headaches. Negative for dizziness, seizures, facial asymmetry, speech difficulty, light-headedness and numbness.   Hematological:  Negative for adenopathy. Does not bruise/bleed easily.   Psychiatric/Behavioral:  Negative for agitation, behavioral problems, confusion, decreased concentration and sleep disturbance.      Physical Exam  Vitals and nursing note reviewed.   Constitutional:       General: She is not in acute distress.     Appearance: Normal appearance. She is not ill-appearing.   HENT:      Head: Normocephalic and atraumatic.      Nose: No congestion or rhinorrhea.   Eyes:      General: No scleral icterus.     Extraocular Movements: Extraocular movements intact.      Pupils: Pupils are equal, round, and reactive to light.   Cardiovascular:      Rate and Rhythm: Normal rate and regular rhythm.      Pulses: Normal pulses.      Heart sounds: Normal heart sounds. No murmur heard.    No gallop.   Pulmonary:      Effort: Pulmonary effort is normal. No respiratory distress.      Breath sounds: Normal breath sounds. No stridor. No wheezing or rhonchi.      Comments: +ve for coarse breath sounds  Abdominal:      General: Bowel sounds are normal. There is no distension.      Palpations: There is no mass.      Tenderness: There is no abdominal tenderness. There is no guarding.   Musculoskeletal:         General: No swelling, tenderness, deformity or signs of injury. Normal range of motion.       Cervical back: Normal range of motion and neck supple. No rigidity. No muscular tenderness.      Right lower leg: No edema.      Left lower leg: No edema.   Skin:     General: Skin is warm and dry.      Coloration: Skin is not jaundiced or pale.      Findings: Rash present. No bruising or lesion.   Neurological:      General: No focal deficit present.      Mental Status: She is alert and oriented to person, place, and time.      Cranial Nerves: No cranial nerve deficit.      Sensory: No sensory deficit.      Motor: No weakness.      Gait: Gait normal.   Psychiatric:         Mood and Affect: Mood normal.         Behavior: Behavior normal.         Thought Content: Thought content normal.     Vitals:    22 0952   BP: 135/87   Pulse: (!) 114   Resp: 20   Temp: 97.2 °F (36.2 °C)   Body surface area is 1.64 meters squared.  Imaging:  Ct c/a/p 22:  CT Abdomen Pelvis  Without Contrast                                RADIOLOGY REPORT        PT NAME: PATRICIA BASHIR [facility]     : 1965 F 56             [address]    ACCT: [id number]                                              [facility] Rec #: [id number]                                        [id number]    Patient Location: [facility].RAD/             Procedure: CT ABD   PELVIS WO/W CONTRAST    REQUISITION #: [id number]      REPORT #: [id number]           DATE OF EXAM: 22    TIME OF EXAM: 1000       CMS MANDATED QUALITY DATA-CT radiation-436        All CT scans at this facility utilize dose modulation, iterative   reconstruction, and/or weight based dosing when appropriate to reduce   radiation dose to as low as reasonably achievable.            CT Abdomen and pelvis without and with contrast        Clinical History:    Follow-up small cell lung cancer.        Technique:    Acquisition: Contiguous scan slices were obtained from the top of the   hemidiaphragm through the pelvis.    Reconstructions: Axial, coronal and  sagittal reconstructions.         Contrast:    IV contrast: 100 cc Isovue 300    Oral contrast: Given    Phases: Pre and postcontrast        Estimated radiation dose: 8.1 mSv.    All CT scans at this facility use dose modulation, iterative reconstruction    and/or weight based dosing when appropriate to reduce radiation dose to as   low as reasonably achievable.        Limitations: None        Comparison:    No prior relevant studies are available at this time.        Findings:    Lower chest: Normal.        Abdomen:    Liver: Fatty liver.        Spleen: Normal.        Gallbladder and biliary tree: Normal.        Pancreas: Normal.        Adrenal glands: Normal.        Urinary tract:    Kidneys: Normal.    Pelvocalyceal systems: Normal. No hydronephrosis.    Ureters: Normal. No hydroureter        Retroperitoneum: Normal.        Mesentery and gastrointestinal tract: There are some colonic diverticuli.   Mild pericolonic stranding adjacent to the mid descending colon. Correlate   clinically for mild diverticulitis. No obstruction. No fluid collection. No    free air.        Pelvis:    Urinary bladder: Normal.    Anteverted uterus. There are fallopian tube closure implants bilaterally.    Inguinal regions: Normal.        Vasculature: Scattered arterial calcific atherosclerotic changes.        Osseous structures: DJD. L5 spondylolysis.        Abdominal wall: Normal.        Additional findings: None seen.        Impression:        1.  Fatty liver.        2.  Mild stranding adjacent to the mid descending colon. Correlate   clinically for mild diverticulitis.        3.  Otherwise negative. Notes of metastatic malignancy.        DICTATING PHYSICIAN:  SAKINA ZABALA MD                   Date Dictated: 07/11/22 1108        Signed By:  SAKINA ZABALA MD <Electronically signed by SAKINA ZABALA MD in OV>    Date Signed:  07/11/22 1112     CC: SARA JAUREGUI NP ; SARA JAUREGUI NP      ADMITTING PHYSICIAN:                                                                                                     ORDERING PHY: SARA JAUREGUI NP                                                                                                                                                      ATTENDING PHY: SARA JAUREGUI NP    Patient Status:  REG CLI    Admit Service Date: 22       [facility] UNKNOWN RAD EAP                                RADIOLOGY REPORT        PT NAME: PATRICIA BASHIR [facility]     : 1965 F 56             [address]    ACCT: [id number]                                              [facility] Rec #: [id number]                                        [id number]    Patient Location: [facility]/             Procedure: CHEST THORAX  WO/W CONT    REQUISITION #: [id number]      REPORT #: [id number]           DATE OF EXAM: 22    TIME OF EXAM: 1045       CMS MANDATED QUALITY DATA-CT radiation-436        All CT scans at this facility utilize dose modulation, iterative   reconstruction, and/or weight based dosing when appropriate to reduce   radiation dose to as low as reasonably achievable.        CT chest with and without contrast        Clinical data:    Small cell lung cancer follow-up        Technique:    Acquisition: Contiguous scan slices were obtained from the apex of the lungs   through the diaphragms.    Reconstructions: Axial, coronal and sagittal. reconstructions of the data   set were obtained.    Contrast:    IV: 100 cc of Isovue 300 was intravenously administered for the examination.    Other: None administered.    Phases: Pre and postcontrast    Estimated radiation dose: 1.9 mSv.    All CT scans at this facility use dose modulation, iterative reconstruction    and/or weight based dosing when appropriate to reduce radiation dose to as   low as reasonably achievable.        Limitations: The images appear technically satisfactory.        Comparison:    February 10,  2022        Findings:    Lungs, pleura and large airways: No left lower lobe infrahilar mass   measuring up to about 3 cm without significant change. There is associated   adjacent left perihilar patchy airspace and interstitial infiltration as   well involving the upper and lower lobe with some increase from prior exam.    Left hemidiaphragm elevation. Trace left pleural effusion.        Heart: Normal.        Thoracic vasculature: Moderate coronary artery calcification.        Pulmonary vasculature: Normal.        Mediastinal and hilar structures: Normal.        Chest wall, axilla and lower neck: Left side central venous port.        Upper abdomen: Fatty liver.        Osseous structures: DJD.        Additional findings: None seen.        Impression        1.  Left lower lobe mass is not grossly changed. There is generalized   increase in the left perihilar infiltrate from prior exam. Follow-up   suggested.        DICTATING PHYSICIAN:  SAKINA ZABALA MD                   Date Dictated: 07/11/22 1100        Signed By:  SAKINA ZABALA MD <Electronically signed by SAKINA ZABALA MD in OV>    Date Signed:  07/11/22 1107     CC: SARA JAUREGUI NP ; SARA JAUREGUI NP      ADMITTING PHYSICIAN:                                                                                                    ORDERING PHY: SARA JAUREGUI NP                                                                                                                                                      ATTENDING PHY: SARA JAUREGUI NP    Patient Status:  REG CLI    Admit Service Date: 07/11/22           MRI Brain 7/5/22:        Assessment/Plan:      ECOG 1    1. Limited Stage Small cell carcinoma: [id number], Stage IIIA    == Diagnosis via Left lower lobe EBUS: FNA.  == I do not feel resectability would be an option which is the preferred option per NCCN guidelines., looking at the size and location. No evidence of metastatic disease on  imaging. However, on the last chest X-ray  she was noted to have suspected left sided effusion, this did not correlate with PET scan but obviously concerning.  She would like to seek a second opinion at [facility] and  I discussed this with her that while she does this, she should not delay start of her treatment considering the size of the mass. Our plan should be for concurrent chemoradiation.  12/21- 4/22 : Carbo/Etoposide + XRT completed,   04/05/2022: PET/CT showed partial response to treatment.   5/27/2022 completed prophylactic whole brain radiation  7/22/22: review of recent CT C/A/P shows stable disease with a 3 cm LLL mass noted with no significant change as compared to 2/2022,    2. Current Smoker:    == Smoking cessation counseling done. She is trying to cut down, reports her  is seeing smoking cessation counselor tomorrow and they will try to quit together  ==Educated on high recurrence risk for small cell lung cancer with smoking    3. Hypertension  ==/90. Patient reports generally bp better than today, she was nervous regarding visit.   ==Instructed to take at home, keep log and to notify pcp if continues to be elevated  ==I will forward today's note to pcp (Dr. Robertson)    4.MARTINO  ==9/16/22: c/o of MARTINO when walking long distances but improves once she sits and rests.Chest xray today WNL    To Do:  ==PET scan   ==RTC in 2 months         Total time spent in counseling and discussion about further management options including relevant lab work, treatment,  prognosis, medications and intended side effects was more than 35 minutes. More than 50% of the time was spent on counseling and coordination of care.  I spent a total of 35 minutes on the day of the visit.This includes face to face time and non-face to face time preparing to see the patient (eg, review of tests), Obtaining and/or reviewing separately obtained history, Documenting clinical information in the electronic or other health  record, Independently interpreting resultsand communicating results to the patient/family/caregiver, or Care coordination.

## 2022-09-16 NOTE — TELEPHONE ENCOUNTER
Patient took chest X-Ray order with her after appt. To have X-ray done at [facility] in [place]. KB LPN.

## 2022-09-20 ENCOUNTER — PATIENT OUTREACH (OUTPATIENT)
Dept: ADMINISTRATIVE | Facility: HOSPITAL | Age: 57
End: 2022-09-20
Payer: MEDICAID

## 2022-09-20 NOTE — PROGRESS NOTES
Working HTN gap report. Pt does not monitor her BP at home. I have spent time educating pt in the past. Of note her BP with Oncology visit last week was WNL.

## 2022-10-13 DIAGNOSIS — C34.90 SMALL CELL LUNG CANCER: Primary | ICD-10-CM

## 2022-10-13 DIAGNOSIS — C34.90 MALIGNANT NEOPLASM OF UNSPECIFIED PART OF UNSPECIFIED BRONCHUS OR LUNG: ICD-10-CM

## 2022-10-13 DIAGNOSIS — C34.92 SMALL CELL LUNG CANCER, LEFT: ICD-10-CM

## 2022-10-28 ENCOUNTER — TELEPHONE (OUTPATIENT)
Dept: PULMONOLOGY | Facility: CLINIC | Age: 57
End: 2022-10-28
Payer: MEDICAID

## 2022-10-28 ENCOUNTER — OFFICE VISIT (OUTPATIENT)
Dept: HEMATOLOGY/ONCOLOGY | Facility: CLINIC | Age: 57
End: 2022-10-28
Payer: MEDICAID

## 2022-10-28 VITALS
BODY MASS INDEX: 20.47 KG/M2 | OXYGEN SATURATION: 93 % | DIASTOLIC BLOOD PRESSURE: 87 MMHG | WEIGHT: 127.38 LBS | HEART RATE: 111 BPM | SYSTOLIC BLOOD PRESSURE: 134 MMHG | RESPIRATION RATE: 18 BRPM | HEIGHT: 66 IN | TEMPERATURE: 97 F

## 2022-10-28 DIAGNOSIS — R63.4 ABNORMAL WEIGHT LOSS: ICD-10-CM

## 2022-10-28 DIAGNOSIS — R11.2 NAUSEA AND VOMITING, UNSPECIFIED VOMITING TYPE: ICD-10-CM

## 2022-10-28 DIAGNOSIS — C34.90 SMALL CELL LUNG CANCER: Primary | ICD-10-CM

## 2022-10-28 DIAGNOSIS — J44.9 CHRONIC OBSTRUCTIVE PULMONARY DISEASE, UNSPECIFIED COPD TYPE: ICD-10-CM

## 2022-10-28 PROCEDURE — 3075F SYST BP GE 130 - 139MM HG: CPT | Mod: CPTII,S$GLB,, | Performed by: NURSE PRACTITIONER

## 2022-10-28 PROCEDURE — 1159F PR MEDICATION LIST DOCUMENTED IN MEDICAL RECORD: ICD-10-PCS | Mod: CPTII,S$GLB,, | Performed by: NURSE PRACTITIONER

## 2022-10-28 PROCEDURE — 3075F PR MOST RECENT SYSTOLIC BLOOD PRESS GE 130-139MM HG: ICD-10-PCS | Mod: CPTII,S$GLB,, | Performed by: NURSE PRACTITIONER

## 2022-10-28 PROCEDURE — 3079F PR MOST RECENT DIASTOLIC BLOOD PRESSURE 80-89 MM HG: ICD-10-PCS | Mod: CPTII,S$GLB,, | Performed by: NURSE PRACTITIONER

## 2022-10-28 PROCEDURE — 99214 OFFICE O/P EST MOD 30 MIN: CPT | Mod: S$GLB,,, | Performed by: NURSE PRACTITIONER

## 2022-10-28 PROCEDURE — 1159F MED LIST DOCD IN RCRD: CPT | Mod: CPTII,S$GLB,, | Performed by: NURSE PRACTITIONER

## 2022-10-28 PROCEDURE — 3079F DIAST BP 80-89 MM HG: CPT | Mod: CPTII,S$GLB,, | Performed by: NURSE PRACTITIONER

## 2022-10-28 PROCEDURE — 99214 PR OFFICE/OUTPT VISIT, EST, LEVL IV, 30-39 MIN: ICD-10-PCS | Mod: S$GLB,,, | Performed by: NURSE PRACTITIONER

## 2022-10-28 RX ORDER — METHYLPREDNISOLONE 4 MG/1
TABLET ORAL
Qty: 1 EACH | Refills: 0 | Status: SHIPPED | OUTPATIENT
Start: 2022-10-28 | End: 2023-01-11

## 2022-10-28 RX ORDER — PROMETHAZINE HYDROCHLORIDE 25 MG/1
TABLET ORAL
Qty: 30 TABLET | Refills: 3 | Status: SHIPPED | OUTPATIENT
Start: 2022-10-28 | End: 2023-05-12 | Stop reason: SDUPTHER

## 2022-10-28 NOTE — TELEPHONE ENCOUNTER
Have patient scheduled attempted to call patient no answer did leave detailed voicemail with appointment information and call back number. LB  ----- Message from Smita Plaza NP sent at 10/28/2022 10:01 AM CDT -----   She needs a f.u visit please. Her copd seems to be worsening.

## 2022-10-28 NOTE — PROGRESS NOTES
MEDICAL ONCOLOGY FOLLOW UP CONSULTATION NOTE    Chief Complaint: Small Cell Lung Cancer    HPI: Patient is a 56 year old female current smoker who underwent screening CT scan chest by her PCP which showed a suspicious mass arising from the left lower lobe with bronchial obstruction highly suspicious for malignancy. She presents to our clinic today for further evaluation. Denies weight loss, activity and appetite changes. Does complain of cough which she attributes it to smoking and says it is chronic with no changes.     Ct Chest 2/10/22:  Left lower mass significantly decreased.  Measures 4.1 x 3.2 x 4.3 cm compared to pretreatment dimensions of 10 cm on CT scan September 27, 2021 and PET-CT on October 11, 2021.        CT scan w/o contrast: 9/27/2021    1.  Category 4x: Very suspicious lesion. Large soft tissue mass effect in   the left lower lobe associated with bronchial obstruction highly suspicious    for malignancy.      Chest CT with or without contrast, CT/PET and/or tissue sampling depending   on the probability of malignancy and comorbidities. Consultation with   pulmonology is also recommended.         PET scan: 10/11/2021  == Large left lower lobe hypermetabolic pass consistent with malignancy.  It has a maximum            diameter on the CT of approximately 10 cm it shows intense radiotracer uptake with the SUV being 17.7      DIAGNOSIS:   11/12/2021 BOWERS/kml   LUNG, LEFT LOWER LOBE, EBUS-FNA:   -- SMALL CELL CARCINOMA.   -- SEE COMMENT.   Comment:   A panel of immunohistochemical stains are performed on block 1A to further   characterize the neoplasm.  The tumor cells are positive for pancytokeratin,   CK7, CK8/18, and CD56.  The cells of interest are negative for P40,   synaptophysin, TTF1, CD3, CD20, CK20, and S100.  The CK7 demonstrates some   dot-like pattern expression.  Controls performed as expected.  The above   findings support the diagnosis of small cell carcinoma.             Labs:  Lab  Results   Component Value Date    WBC 7.4 06/13/2022    HGB 15.0 06/13/2022    HCT 43.6 06/13/2022    .9 (H) 06/13/2022    LABPLAT 265 06/13/2022      Platelets   Date Value Ref Range Status   06/13/2022 265 142 - 424 10*3/uL Final     CMP  Sodium   Date Value Ref Range Status   06/13/2022 138 135 - 145 mmol/L Final     Potassium   Date Value Ref Range Status   06/13/2022 4.2 3.6 - 5.2 mmol/L Final     Chloride   Date Value Ref Range Status   06/13/2022 101 100 - 108 mmol/L Final     CO2   Date Value Ref Range Status   06/13/2022 27 21 - 32 mmol/L Final     Glucose   Date Value Ref Range Status   06/13/2022 128 (H) 70 - 110 mg/dL Final     BUN   Date Value Ref Range Status   06/13/2022 3 (L) 7 - 18 mg/dL Final     Creatinine   Date Value Ref Range Status   06/13/2022 0.69 0.55 - 1.02 mg/dL Final     Calcium   Date Value Ref Range Status   06/13/2022 8.8 8.8 - 10.5 mg/dL Final     Total Protein   Date Value Ref Range Status   06/13/2022 7.6 6.4 - 8.2 g/dL Final     Albumin   Date Value Ref Range Status   06/13/2022 3.7 3.4 - 5.0 g/dL Final     Total Bilirubin   Date Value Ref Range Status   06/13/2022 0.5 0.0 - 1.0 mg/dL Final     Alkaline Phosphatase   Date Value Ref Range Status   06/13/2022 128 (H) 46 - 116 U/L Final     AST   Date Value Ref Range Status   06/13/2022 37 15 - 37 U/L Final     ALT   Date Value Ref Range Status   06/13/2022 39 12 - 78 U/L Final     Anion Gap   Date Value Ref Range Status   06/13/2022 10.0 3.0 - 11.0 mmol/L Final     eGFR if    Date Value Ref Range Status   09/17/2021 124 > OR = 60 mL/min/1.73m2 Final     eGFR if non    Date Value Ref Range Status   09/17/2021 107 > OR = 60 mL/min/1.73m2 Final            Past Medical History:   Diagnosis Date    Allergy     Esophagitis     Fatty liver 08/29/2022    Diagnosed by Ultrasound which was order by Dr. Castillo.    GERD (gastroesophageal reflux disease)     Hiatal hernia     Schatzki's ring of distal  esophagus     Small cell lung cancer 11/15/2021     Family History   Problem Relation Age of Onset    Emphysema Mother     Diabetes Mother     Diabetes Father     Alcohol abuse Father     No Known Problems Sister     No Known Problems Brother     No Known Problems Maternal Aunt     No Known Problems Maternal Uncle     No Known Problems Paternal Aunt     No Known Problems Paternal Uncle     No Known Problems Maternal Grandmother     No Known Problems Maternal Grandfather     No Known Problems Paternal Grandmother     No Known Problems Paternal Grandfather     No Known Problems Daughter      Social History     Socioeconomic History    Marital status:    Tobacco Use    Smoking status: Every Day     Packs/day: 0.50     Years: 40.00     Pack years: 20.00     Types: Cigarettes    Smokeless tobacco: Never   Substance and Sexual Activity    Alcohol use: Yes     Alcohol/week: 10.0 standard drinks     Types: 10 Cans of beer per week    Drug use: Never    Sexual activity: Yes     Partners: Male     Birth control/protection: None     Past Surgical History:   Procedure Laterality Date    COLONOSCOPY       in [place] and he did the dialation     COLONOSCOPY  08/17/2022    ESOPHAGEAL DILATION      ESOPHAGOGASTRODUODENOSCOPY  08/10/2022    SPINAL FUSION      neck         Review of systems:  Review of Systems   Constitutional:  Negative for activity change, appetite change, chills, diaphoresis, fatigue and unexpected weight change.   HENT:  Negative for congestion, facial swelling, hearing loss, mouth sores, trouble swallowing and voice change.    Eyes:  Negative for photophobia, pain, discharge and itching.   Respiratory:  Negative for apnea, cough, choking, chest tightness, shortness of breath, wheezing and stridor.    Cardiovascular:  Negative for chest pain, palpitations and leg swelling.   Gastrointestinal:  Positive for nausea. Negative for abdominal distention, abdominal pain, anal bleeding and blood in stool.    Endocrine: Negative for cold intolerance, heat intolerance, polydipsia and polyphagia.   Genitourinary:  Negative for difficulty urinating, dysuria, flank pain and hematuria.   Musculoskeletal:  Negative for arthralgias, back pain, joint swelling, myalgias, neck pain and neck stiffness.        +ve for joint pain   Skin:  Positive for rash. Negative for color change, pallor and wound.   Allergic/Immunologic: Negative for environmental allergies, food allergies and immunocompromised state.   Neurological:  Positive for headaches. Negative for dizziness, seizures, facial asymmetry, speech difficulty, light-headedness and numbness.   Hematological:  Negative for adenopathy. Does not bruise/bleed easily.   Psychiatric/Behavioral:  Negative for agitation, behavioral problems, confusion, decreased concentration and sleep disturbance.    All other systems reviewed and are negative.    Physical Exam  Vitals and nursing note reviewed.   Constitutional:       General: She is not in acute distress.     Appearance: Normal appearance. She is not ill-appearing.   HENT:      Head: Normocephalic and atraumatic.      Nose: No congestion or rhinorrhea.   Eyes:      General: No scleral icterus.     Extraocular Movements: Extraocular movements intact.      Pupils: Pupils are equal, round, and reactive to light.   Cardiovascular:      Rate and Rhythm: Normal rate and regular rhythm.      Pulses: Normal pulses.      Heart sounds: Normal heart sounds. No murmur heard.    No gallop.   Pulmonary:      Effort: Pulmonary effort is normal. No respiratory distress.      Breath sounds: Normal breath sounds. No stridor. No wheezing or rhonchi.      Comments: +ve for coarse breath sounds  Abdominal:      General: Bowel sounds are normal. There is no distension.      Palpations: There is no mass.      Tenderness: There is no abdominal tenderness. There is no guarding.   Musculoskeletal:         General: No swelling, tenderness, deformity or signs  of injury. Normal range of motion.      Cervical back: Normal range of motion and neck supple. No rigidity. No muscular tenderness.      Right lower leg: No edema.      Left lower leg: No edema.   Skin:     General: Skin is warm and dry.      Coloration: Skin is not jaundiced or pale.      Findings: Rash present. No bruising or lesion.   Neurological:      General: No focal deficit present.      Mental Status: She is alert and oriented to person, place, and time.      Cranial Nerves: No cranial nerve deficit.      Sensory: No sensory deficit.      Motor: No weakness.      Gait: Gait normal.   Psychiatric:         Mood and Affect: Mood normal.         Behavior: Behavior normal.         Thought Content: Thought content normal.     Vitals:    10/28/22 0946   BP: 134/87   Pulse: (!) 111   Resp: 18   Temp: 97.2 °F (36.2 °C)   Body surface area is 1.64 meters squared.  Imaging:                MRI Brain 7/5/22:        Assessment/Plan:      ECOG 1    1. Limited Stage Small cell carcinoma: T4N0M0, Stage IIIA    == Diagnosis via Left lower lobe EBUS: FNA.  == I do not feel resectability would be an option which is the preferred option per NCCN guidelines., looking at the size and location. No evidence of metastatic disease on imaging. However, on the last chest X-ray  she was noted to have suspected left sided effusion, this did not correlate with PET scan but obviously concerning.  She would like to seek a second opinion at [facility] and  I discussed this with her that while she does this, she should not delay start of her treatment considering the size of the mass. Our plan should be for concurrent chemoradiation.  12/21- 4/22 : Carbo/Etoposide + XRT completed,   04/05/2022: PET/CT showed partial response to treatment.   5/27/2022 completed prophylactic whole brain radiation  7/22/22: review of recent CT C/A/P shows stable disease with a 3 cm LLL mass noted with no significant change as compared to 2/2022,  10/28/22:  review of recent PET shows stable disease with decrease size and SUV of previous lung lesion. She reports weight loss, chronic nausea, fatigue, cough and SOB. Will reach out to pcp r/g weight loss and nausea, will add phenergan as well. Advised to f/u with pulm for chronic cough and SOB, will send out medrol dose pack.       To Do:   ==RTC in 2 months, CT chest         Total time spent in counseling and discussion about further management options including relevant lab work, treatment,  prognosis, medications and intended side effects was more than 35 minutes. More than 50% of the time was spent on counseling and coordination of care.  I spent a total of 35 minutes on the day of the visit.This includes face to face time and non-face to face time preparing to see the patient (eg, review of tests), Obtaining and/or reviewing separately obtained history, Documenting clinical information in the electronic or other health record, Independently interpreting resultsand communicating results to the patient/family/caregiver, or Care coordination.

## 2022-11-01 DIAGNOSIS — K21.9 GASTROESOPHAGEAL REFLUX DISEASE, UNSPECIFIED WHETHER ESOPHAGITIS PRESENT: ICD-10-CM

## 2022-11-01 DIAGNOSIS — M25.559 HIP PAIN: ICD-10-CM

## 2022-11-01 NOTE — TELEPHONE ENCOUNTER
----- Message from Brittany Wolfe sent at 11/1/2022  2:21 PM CDT -----  Contact: pt  Pt calling for refill on gabapentin (NEURONTIN) 300 MG and pantoprazole (PROTONIX) 40 MG.  Pt can be reached at [phone].  Pt would like change pharmacy to [facility]  [address] [place] [address]  Phone: [phone]    Thanks,

## 2022-11-02 RX ORDER — PANTOPRAZOLE SODIUM 40 MG/1
40 TABLET, DELAYED RELEASE ORAL DAILY
Qty: 30 TABLET | Refills: 3 | Status: SHIPPED | OUTPATIENT
Start: 2022-11-02 | End: 2024-02-26 | Stop reason: SDUPTHER

## 2022-11-02 RX ORDER — GABAPENTIN 300 MG/1
300 CAPSULE ORAL 2 TIMES DAILY
Qty: 60 CAPSULE | Refills: 3 | Status: SHIPPED | OUTPATIENT
Start: 2022-11-02 | End: 2023-02-27

## 2022-11-16 ENCOUNTER — PATIENT MESSAGE (OUTPATIENT)
Dept: ADMINISTRATIVE | Facility: HOSPITAL | Age: 57
End: 2022-11-16
Payer: MEDICAID

## 2022-11-21 ENCOUNTER — OFFICE VISIT (OUTPATIENT)
Dept: PRIMARY CARE CLINIC | Facility: CLINIC | Age: 57
End: 2022-11-21
Payer: MEDICAID

## 2022-11-21 VITALS
HEART RATE: 104 BPM | WEIGHT: 126 LBS | OXYGEN SATURATION: 94 % | HEIGHT: 66 IN | BODY MASS INDEX: 20.25 KG/M2 | SYSTOLIC BLOOD PRESSURE: 141 MMHG | DIASTOLIC BLOOD PRESSURE: 91 MMHG

## 2022-11-21 DIAGNOSIS — J44.9 CHRONIC OBSTRUCTIVE PULMONARY DISEASE, UNSPECIFIED COPD TYPE: ICD-10-CM

## 2022-11-21 DIAGNOSIS — C34.90 SMALL CELL LUNG CANCER: ICD-10-CM

## 2022-11-21 DIAGNOSIS — Z12.39 ENCOUNTER FOR SCREENING FOR MALIGNANT NEOPLASM OF BREAST, UNSPECIFIED SCREENING MODALITY: ICD-10-CM

## 2022-11-21 DIAGNOSIS — F17.200 SMOKING ADDICTION: ICD-10-CM

## 2022-11-21 DIAGNOSIS — Z23 FLU VACCINE NEED: ICD-10-CM

## 2022-11-21 DIAGNOSIS — R11.2 NAUSEA AND VOMITING, UNSPECIFIED VOMITING TYPE: Primary | ICD-10-CM

## 2022-11-21 PROCEDURE — 3077F SYST BP >= 140 MM HG: CPT | Mod: CPTII,S$GLB,, | Performed by: INTERNAL MEDICINE

## 2022-11-21 PROCEDURE — 3008F BODY MASS INDEX DOCD: CPT | Mod: CPTII,S$GLB,, | Performed by: INTERNAL MEDICINE

## 2022-11-21 PROCEDURE — 90694 FLU VACCINE - QUADRIVALENT - ADJUVANTED: ICD-10-PCS | Mod: S$GLB,,, | Performed by: INTERNAL MEDICINE

## 2022-11-21 PROCEDURE — 99214 OFFICE O/P EST MOD 30 MIN: CPT | Mod: 25,S$GLB,, | Performed by: INTERNAL MEDICINE

## 2022-11-21 PROCEDURE — 90471 FLU VACCINE - QUADRIVALENT - ADJUVANTED: ICD-10-PCS | Mod: S$GLB,,, | Performed by: INTERNAL MEDICINE

## 2022-11-21 PROCEDURE — 3008F PR BODY MASS INDEX (BMI) DOCUMENTED: ICD-10-PCS | Mod: CPTII,S$GLB,, | Performed by: INTERNAL MEDICINE

## 2022-11-21 PROCEDURE — 99214 PR OFFICE/OUTPT VISIT, EST, LEVL IV, 30-39 MIN: ICD-10-PCS | Mod: 25,S$GLB,, | Performed by: INTERNAL MEDICINE

## 2022-11-21 PROCEDURE — 3080F PR MOST RECENT DIASTOLIC BLOOD PRESSURE >= 90 MM HG: ICD-10-PCS | Mod: CPTII,S$GLB,, | Performed by: INTERNAL MEDICINE

## 2022-11-21 PROCEDURE — 90471 IMMUNIZATION ADMIN: CPT | Mod: S$GLB,,, | Performed by: INTERNAL MEDICINE

## 2022-11-21 PROCEDURE — 90694 VACC AIIV4 NO PRSRV 0.5ML IM: CPT | Mod: S$GLB,,, | Performed by: INTERNAL MEDICINE

## 2022-11-21 PROCEDURE — 3080F DIAST BP >= 90 MM HG: CPT | Mod: CPTII,S$GLB,, | Performed by: INTERNAL MEDICINE

## 2022-11-21 PROCEDURE — 3077F PR MOST RECENT SYSTOLIC BLOOD PRESSURE >= 140 MM HG: ICD-10-PCS | Mod: CPTII,S$GLB,, | Performed by: INTERNAL MEDICINE

## 2022-11-21 RX ORDER — FLUTICASONE PROPIONATE 50 MCG
1 SPRAY, SUSPENSION (ML) NASAL DAILY
Qty: 16 G | Refills: 0 | Status: SHIPPED | OUTPATIENT
Start: 2022-11-21

## 2022-11-21 RX ORDER — CETIRIZINE HYDROCHLORIDE 10 MG/1
10 TABLET ORAL DAILY
Qty: 30 TABLET | Refills: 3 | Status: SHIPPED | OUTPATIENT
Start: 2022-11-21 | End: 2023-08-09

## 2022-11-21 RX ORDER — AZITHROMYCIN 250 MG/1
TABLET, FILM COATED ORAL
Qty: 6 TABLET | Refills: 0 | Status: SHIPPED | OUTPATIENT
Start: 2022-11-21 | End: 2022-11-26

## 2022-11-21 NOTE — PROGRESS NOTES
"Subjective:      Patient ID: Diana Prather is a 57 y.o. female.    Chief Complaint: Follow-up and ears clogged (She states she has tried "everything otc." )    HPI    As above.     Past Medical History:   Diagnosis Date    Allergy     Esophagitis     Fatty liver 08/29/2022    Diagnosed by Ultrasound which was order by Dr. Castillo.    GERD (gastroesophageal reflux disease)     Hiatal hernia     Schatzki's ring of distal esophagus     Small cell lung cancer 11/15/2021     Another complaint is patient has long standing nausea and her appetite is not what it used to be. She is on protonix   She is about 5 lbs less than when I first saw her. She states she is very busy taking care of her  who is also ill  Her gastroenterologist is Dr Birmingham and she has had several testing done  Abdominal US done showed fatty liver and contracted GB, HIDA scan was normal. CT is august showed fatty liver and some mild diverticulitis. She has had EGD and colonoscopy done by Dr Birmingham        Review of Systems   Constitutional:  Negative for chills and fever.   Respiratory:  Positive for cough and shortness of breath. Negative for wheezing.    Cardiovascular:  Negative for chest pain, palpitations and leg swelling.   Gastrointestinal:  Positive for nausea. Negative for abdominal pain, constipation, diarrhea and vomiting.   Musculoskeletal:  Negative for falls.   Skin:  Negative for rash.   Neurological:  Negative for dizziness and headaches.   Psychiatric/Behavioral:  Positive for depression. Negative for substance abuse.    Objective:     Physical Exam  Vitals reviewed.   Constitutional:       Appearance: She is ill-appearing.   HENT:      Head: Normocephalic.      Right Ear: Tympanic membrane normal. There is no impacted cerumen.      Left Ear: Tympanic membrane normal. There is no impacted cerumen.   Eyes:      Extraocular Movements: Extraocular movements intact.      Conjunctiva/sclera: Conjunctivae normal.      Pupils: " "Pupils are equal, round, and reactive to light.   Pulmonary:      Effort: Pulmonary effort is normal.      Breath sounds: No stridor. Wheezing present.   Chest:      Chest wall: No tenderness.   Abdominal:      General: Bowel sounds are normal.   Musculoskeletal:         General: Normal range of motion.   Skin:     General: Skin is warm.   Neurological:      General: No focal deficit present.      Mental Status: She is alert and oriented to person, place, and time.   Psychiatric:         Mood and Affect: Mood normal.     BP (!) 141/91 (BP Location: Left arm, Patient Position: Sitting, BP Method: Medium (Automatic))   Pulse 104   Ht 5' 6" (1.676 m)   Wt 57.2 kg (126 lb)   LMP  (LMP Unknown)   SpO2 (!) 94%   BMI 20.34 kg/m²     Assessment:       ICD-10-CM ICD-9-CM   1. Nausea and vomiting, unspecified vomiting type  R11.2 787.01   2. Encounter for screening for malignant neoplasm of breast, unspecified screening modality  Z12.39 V76.10   3. Flu vaccine need  Z23 V04.81   4. Chronic obstructive pulmonary disease, unspecified COPD type  J44.9 496   5. Smoking addiction  F17.200 305.1   6. Small cell lung cancer  C34.90 162.9       Plan:     Medication List with Changes/Refills   New Medications    AMITRIPTYLINE (ELAVIL) 10 MG TABLET    Take 1 tablet (10 mg total) by mouth every evening.    AZITHROMYCIN (Z-PANDA) 250 MG TABLET    Take 2 tablets by mouth on day 1; Take 1 tablet by mouth on days 2-5    CETIRIZINE (ZYRTEC) 10 MG TABLET    Take 1 tablet (10 mg total) by mouth once daily.    FAMOTIDINE (PEPCID) 20 MG TABLET    Take 1 tablet (20 mg total) by mouth 2 (two) times daily.    FLUTICASONE PROPIONATE (FLONASE) 50 MCG/ACTUATION NASAL SPRAY    1 spray (50 mcg total) by Each Nostril route once daily.   Current Medications    ALBUTEROL (PROVENTIL/VENTOLIN HFA) 90 MCG/ACTUATION INHALER    INHALE 2 PUFFS INTO THE LUNGS EVERY 6 HOURS AS NEEDED FOR WHEEZING, RESCUE    ALBUTEROL (PROVENTIL/VENTOLIN HFA) 90 MCG/ACTUATION " INHALER    INHALE 2 PUFFS INTO THE LUNGS EVERY 6 HOURS AS NEEDED FOR WHEEZING, RESCUE    BUDESONIDE-FORMOTEROL 160-4.5 MCG (SYMBICORT) 160-4.5 MCG/ACTUATION HFAA    Inhale 2 puffs into the lungs every 12 (twelve) hours. Controller    GABAPENTIN (NEURONTIN) 300 MG CAPSULE    Take 1 capsule (300 mg total) by mouth 2 (two) times daily.    LIDOCAINE VISCOUS 2 % SOLUTION        MEMANTINE (NAMENDA) 5 MG TAB    Take 5 mg by mouth once daily at 6am.    METHYLPREDNISOLONE (MEDROL DOSEPACK) 4 MG TABLET    Take 1 tablet (4 mg total) by mouth 6 (six) times daily, THEN 1 tablet (4 mg total) 5 (five) times daily, THEN 1 tablet (4 mg total) 4 (four) times daily, THEN 1 tablet (4 mg total) 3 (three) times daily, THEN 1 tablet (4 mg total) 2 (two) times daily, THEN 1 tablet (4 mg total) once daily. use as directed.    ONDANSETRON (ZOFRAN-ODT) 8 MG TBDL    Take 1 tablet (8 mg total) by mouth every 6 (six) hours as needed.    PANTOPRAZOLE (PROTONIX) 40 MG TABLET    Take 1 tablet (40 mg total) by mouth once daily.    PROMETHAZINE (PHENERGAN) 25 MG TABLET    TAKE 1 TABLET(25 MG) BY MOUTH EVERY 8 HOURS Strength: 25 mg   Discontinued Medications    HYDROXYZINE HCL (ATARAX) 25 MG TABLET    Take 1 tablet (25 mg total) by mouth 3 (three) times daily as needed for Itching.    NORTRIPTYLINE (PAMELOR) 50 MG CAPSULE    TAKE 1 CAPSULE(50 MG) BY MOUTH EVERY NIGHT        Nausea and vomiting, unspecified vomiting type  -     famotidine (PEPCID) 20 MG tablet; Take 1 tablet (20 mg total) by mouth 2 (two) times daily.  Dispense: 60 tablet; Refill: 11  -     amitriptyline (ELAVIL) 10 MG tablet; Take 1 tablet (10 mg total) by mouth every evening.  Dispense: 30 tablet; Refill: 3    Encounter for screening for malignant neoplasm of breast, unspecified screening modality  -     Mammo Digital Screening Bilat; Future; Expected date: 11/21/2022    Flu vaccine need  -     Influenza (FLUAD) - Quadrivalent (Adjuvanted) *Preferred* (65+) (PF)    Chronic  obstructive pulmonary disease, unspecified COPD type    Smoking addiction    Small cell lung cancer    Other orders  -     fluticasone propionate (FLONASE) 50 mcg/actuation nasal spray; 1 spray (50 mcg total) by Each Nostril route once daily.  Dispense: 16 g; Refill: 0  -     cetirizine (ZYRTEC) 10 MG tablet; Take 1 tablet (10 mg total) by mouth once daily.  Dispense: 30 tablet; Refill: 3  -     azithromycin (Z-PANDA) 250 MG tablet; Take 2 tablets by mouth on day 1; Take 1 tablet by mouth on days 2-5  Dispense: 6 tablet; Refill: 0       Future Appointments   Date Time Provider Department Center   11/28/2022 12:40 PM [facility] [address]   12/30/2022 11:00 AM Smita Plaza NP [facility] [address]   1/11/2023  1:00 PM Darby Lind NP [id number]         Assistance with smoking cessation was offered, including:  []  Medications  [x]  Counseling  []  Printed Information on Smoking Cessation  []  Referral to a Smoking Cessation Program    Patient was counseled regarding smoking for 3-10 minutes.     Patient states she has not been using her breathing treatments and I counseled her on the number of times she has to use symbicort and albuterol. She needs to be compliant.   Only diagnosis that has been noted with nausea/bloating is gastritis not controlled with protonix. She may need pH monitoring by her gastroenterologist. She needs to quit smoking. Will add pepcid to protonix and try amitriptyline, she states the nortryptiline doesn't really help with sleep

## 2022-11-22 NOTE — ASSESSMENT & PLAN NOTE
Non compliant with medications   Reviewed medications and she has refills   Stressed to use symbicort 2 puffs twice a day and albuterol Q4-6 hours as needed for cough etc

## 2022-11-25 RX ORDER — FAMOTIDINE 20 MG/1
20 TABLET, FILM COATED ORAL 2 TIMES DAILY
Qty: 60 TABLET | Refills: 11 | Status: SHIPPED | OUTPATIENT
Start: 2022-11-25 | End: 2023-05-12 | Stop reason: SDUPTHER

## 2022-11-25 RX ORDER — AMITRIPTYLINE HYDROCHLORIDE 10 MG/1
10 TABLET, FILM COATED ORAL NIGHTLY
Qty: 30 TABLET | Refills: 3 | Status: SHIPPED | OUTPATIENT
Start: 2022-11-25 | End: 2023-03-24

## 2022-11-28 ENCOUNTER — OFFICE VISIT (OUTPATIENT)
Dept: PULMONOLOGY | Facility: CLINIC | Age: 57
End: 2022-11-28
Payer: MEDICAID

## 2022-11-28 VITALS
WEIGHT: 128 LBS | RESPIRATION RATE: 16 BRPM | BODY MASS INDEX: 20.57 KG/M2 | DIASTOLIC BLOOD PRESSURE: 99 MMHG | HEART RATE: 119 BPM | HEIGHT: 66 IN | SYSTOLIC BLOOD PRESSURE: 151 MMHG | OXYGEN SATURATION: 92 %

## 2022-11-28 DIAGNOSIS — J44.9 CHRONIC OBSTRUCTIVE PULMONARY DISEASE, UNSPECIFIED COPD TYPE: ICD-10-CM

## 2022-11-28 PROCEDURE — 3080F PR MOST RECENT DIASTOLIC BLOOD PRESSURE >= 90 MM HG: ICD-10-PCS | Mod: CPTII,S$GLB,,

## 2022-11-28 PROCEDURE — 3008F PR BODY MASS INDEX (BMI) DOCUMENTED: ICD-10-PCS | Mod: CPTII,S$GLB,,

## 2022-11-28 PROCEDURE — 99215 PR OFFICE/OUTPT VISIT, EST, LEVL V, 40-54 MIN: ICD-10-PCS | Mod: S$GLB,,,

## 2022-11-28 PROCEDURE — 3080F DIAST BP >= 90 MM HG: CPT | Mod: CPTII,S$GLB,,

## 2022-11-28 PROCEDURE — 1159F PR MEDICATION LIST DOCUMENTED IN MEDICAL RECORD: ICD-10-PCS | Mod: CPTII,S$GLB,,

## 2022-11-28 PROCEDURE — 3077F PR MOST RECENT SYSTOLIC BLOOD PRESSURE >= 140 MM HG: ICD-10-PCS | Mod: CPTII,S$GLB,,

## 2022-11-28 PROCEDURE — 99215 OFFICE O/P EST HI 40 MIN: CPT | Mod: S$GLB,,,

## 2022-11-28 PROCEDURE — 3008F BODY MASS INDEX DOCD: CPT | Mod: CPTII,S$GLB,,

## 2022-11-28 PROCEDURE — 1159F MED LIST DOCD IN RCRD: CPT | Mod: CPTII,S$GLB,,

## 2022-11-28 PROCEDURE — 3077F SYST BP >= 140 MM HG: CPT | Mod: CPTII,S$GLB,,

## 2022-11-28 NOTE — PROGRESS NOTES
Subjective:    Patient Identification:  Patient ID: Diana Prather is a 57 y.o. female.    Referring Provider:  No ref. provider found     Chief Complaint:  Chronic obstructive pulmonary disease, unspecified COPD type      History of Present Illness:    Diana Prather is a 57 y.o. female who presents for COPD follow up. Also has a history on SCLC. She has not been using her Symbicort inhaler or her rescue albuterol because it makes her extremely jittery. Due to not being able to use the appropriate control medications she does struggle with SOBOE. She has had a previous walk test done that showed no exertional hypoxia, but since then she has had chemo and radiation.     Review of Systems:  Review of Systems   Constitutional:  Negative for fever, chills, appetite change, night sweats and weakness.   HENT:  Negative for postnasal drip, rhinorrhea, sore throat, trouble swallowing, voice change, congestion and ear pain.    Respiratory:  Positive for cough, shortness of breath and dyspnea on extertion. Negative for hemoptysis.    Cardiovascular:  Negative for chest pain, palpitations and leg swelling.   Genitourinary:  Negative for difficulty urinating and hematuria.   Endocrine:  Negative for polydipsia, polyphagia, cold intolerance, heat intolerance and polyuria.    Musculoskeletal:  Negative for joint swelling and myalgias.   Skin:  Negative for rash.   Gastrointestinal:  Negative for nausea, vomiting, abdominal pain and abdominal distention.   Neurological:  Negative for dizziness, syncope, light-headedness and headaches.   Psychiatric/Behavioral:  Negative for confusion and sleep disturbance. The patient is not nervous/anxious.      Allergies:   Review of patient's allergies indicates:   Allergen Reactions    Pyridium [phenazopyridine] Hives       Medications:      Past Medical History:      Past Medical History:   Diagnosis Date    Allergy     Esophagitis     Fatty liver 08/29/2022    Diagnosed by  Ultrasound which was order by Dr. Castillo.    GERD (gastroesophageal reflux disease)     Hiatal hernia     Schatzki's ring of distal esophagus     Small cell lung cancer 11/15/2021       Family History:      Family History   Problem Relation Age of Onset    Emphysema Mother     Diabetes Mother     Diabetes Father     Alcohol abuse Father     No Known Problems Sister     No Known Problems Brother     No Known Problems Maternal Aunt     No Known Problems Maternal Uncle     No Known Problems Paternal Aunt     No Known Problems Paternal Uncle     No Known Problems Maternal Grandmother     No Known Problems Maternal Grandfather     No Known Problems Paternal Grandmother     No Known Problems Paternal Grandfather     No Known Problems Daughter         Social History:      Past Surgical History:   Procedure Laterality Date    COLONOSCOPY       in [place] and he did the dialation     COLONOSCOPY  08/17/2022    ESOPHAGEAL DILATION      ESOPHAGOGASTRODUODENOSCOPY  08/10/2022    SPINAL FUSION      neck       Physical Exam:  Vitals:    11/28/22 1315   BP: (!) 151/99   Pulse: (!) 119   Resp: 16     Physical Exam   Constitutional: She is oriented to person, place, and time. She appears not cachectic. No distress.   HENT:   Head: Normocephalic.   Right Ear: External ear normal.   Left Ear: External ear normal.   Nose: Nose normal. No mucosal edema. No polyps.   Mouth/Throat: Oropharynx is clear and moist. Normal dentition. No oropharyngeal exudate. Mallampati Score: III.   Neck: No JVD present. No tracheal deviation present. No thyromegaly present.   Cardiovascular: Normal rate, regular rhythm, normal heart sounds and intact distal pulses. Exam reveals no gallop and no friction rub.   No murmur heard.  Pulmonary/Chest: Normal expansion, symmetric chest wall expansion and effort normal. No stridor. No respiratory distress. She has decreased breath sounds. She has no wheezes. She has no rhonchi. She has no rales. Chest wall  is not dull to percussion. She exhibits no tenderness. Negative for egophony. Negative for tactile fremitus.   Abdominal: Soft. Bowel sounds are normal. She exhibits no distension and no mass. There is no hepatosplenomegaly. There is no abdominal tenderness. There is no rebound and no guarding. No hernia.   Musculoskeletal:         General: No tenderness, deformity or edema. Normal range of motion.      Cervical back: Normal range of motion and neck supple.   Lymphadenopathy: No supraclavicular adenopathy is present.     She has no cervical adenopathy.     She has no axillary adenopathy.   Neurological: She is alert and oriented to person, place, and time. She has normal reflexes. She displays normal reflexes. No cranial nerve deficit. She exhibits normal muscle tone.   Skin: Skin is warm and dry. No rash noted. She is not diaphoretic. No cyanosis or erythema. No pallor. Nails show no clubbing.   Psychiatric: She has a normal mood and affect. Her behavior is normal. Judgment and thought content normal.         No results found for: PREFEV1, VAH5FIVPIA, PREFVC, FVCPREREF, YESDQA9RKW, RBI8DUMAPWW, EPEE5VAZ, CTXQ4ROZ, PREDLCO, DLCOSBPRRF, DLCOADJPRE, DLCOCSBRPRRF, POSTFEV1, POSTFVC, ZZROMJY9THM   CT Abdomen Pelvis  Without Contrast                                RADIOLOGY REPORT        PT NAME: PATRICIA BASHIR      [facility]     : 1965 F 56             [address]    ACCT: [id number]                                              [facility] Rec #: [id number]                                        [id number]    Patient Location: [facility]/             Procedure: CT ABD   PELVIS WO/W CONTRAST    REQUISITION #: [id number]      REPORT #: [id number]           DATE OF EXAM: 22    TIME OF EXAM: 1000       CMS MANDATED QUALITY DATA-CT radiation-436        All CT scans at this facility utilize dose modulation, iterative   reconstruction, and/or weight based dosing when appropriate to reduce    radiation dose to as low as reasonably achievable.            CT Abdomen and pelvis without and with contrast        Clinical History:    Follow-up small cell lung cancer.        Technique:    Acquisition: Contiguous scan slices were obtained from the top of the   hemidiaphragm through the pelvis.    Reconstructions: Axial, coronal and sagittal reconstructions.         Contrast:    IV contrast: 100 cc Isovue 300    Oral contrast: Given    Phases: Pre and postcontrast        Estimated radiation dose: 8.1 mSv.    All CT scans at this facility use dose modulation, iterative reconstruction    and/or weight based dosing when appropriate to reduce radiation dose to as   low as reasonably achievable.        Limitations: None        Comparison:    No prior relevant studies are available at this time.        Findings:    Lower chest: Normal.        Abdomen:    Liver: Fatty liver.        Spleen: Normal.        Gallbladder and biliary tree: Normal.        Pancreas: Normal.        Adrenal glands: Normal.        Urinary tract:    Kidneys: Normal.    Pelvocalyceal systems: Normal. No hydronephrosis.    Ureters: Normal. No hydroureter        Retroperitoneum: Normal.        Mesentery and gastrointestinal tract: There are some colonic diverticuli.   Mild pericolonic stranding adjacent to the mid descending colon. Correlate   clinically for mild diverticulitis. No obstruction. No fluid collection. No    free air.        Pelvis:    Urinary bladder: Normal.    Anteverted uterus. There are fallopian tube closure implants bilaterally.    Inguinal regions: Normal.        Vasculature: Scattered arterial calcific atherosclerotic changes.        Osseous structures: DJD. L5 spondylolysis.        Abdominal wall: Normal.        Additional findings: None seen.        Impression:        1.  Fatty liver.        2.  Mild stranding adjacent to the mid descending colon. Correlate   clinically for mild diverticulitis.        3.  Otherwise negative.  Notes of metastatic malignancy.        DICTATING PHYSICIAN:  SAKINA ZABALA MD                   Date Dictated: 22 1108        Signed By:  SAKINA ZABALA MD <Electronically signed by SAKINA ZABALA MD in OV>    Date Signed:  22 1112     CC: SARA JAUREGUI NP ; SARA JAUREGUI NP      ADMITTING PHYSICIAN:                                                                                                    ORDERING PHY: SARA JAUREGUI NP                                                                                                                                                      ATTENDING PHY: SARA JAUREGUI NP    Patient Status:  REG CLI    Admit Service Date: 22       [facility] UNKNOWN RAD EAP                                RADIOLOGY REPORT        PT NAME: PATRICIA BASHIR      [facility]     : 1965 F 56             [address]    ACCT: [id number]                                              [place] Rec #: [id number]                                        [id number]    Patient Location: [facility]/             Procedure: CHEST THORAX  WO/W CONT    REQUISITION #: [id number]      REPORT #: [id number]           DATE OF EXAM: 22    TIME OF EXAM: 1045       CMS MANDATED QUALITY DATA-CT radiation-436        All CT scans at this facility utilize dose modulation, iterative   reconstruction, and/or weight based dosing when appropriate to reduce   radiation dose to as low as reasonably achievable.        CT chest with and without contrast        Clinical data:    Small cell lung cancer follow-up        Technique:    Acquisition: Contiguous scan slices were obtained from the apex of the lungs   through the diaphragms.    Reconstructions: Axial, coronal and sagittal. reconstructions of the data   set were obtained.    Contrast:    IV: 100 cc of Isovue 300 was intravenously administered for the examination.    Other: None administered.    Phases: Pre  and postcontrast    Estimated radiation dose: 1.9 mSv.    All CT scans at this facility use dose modulation, iterative reconstruction    and/or weight based dosing when appropriate to reduce radiation dose to as   low as reasonably achievable.        Limitations: The images appear technically satisfactory.        Comparison:    February 10, 2022        Findings:    Lungs, pleura and large airways: No left lower lobe infrahilar mass   measuring up to about 3 cm without significant change. There is associated   adjacent left perihilar patchy airspace and interstitial infiltration as   well involving the upper and lower lobe with some increase from prior exam.    Left hemidiaphragm elevation. Trace left pleural effusion.        Heart: Normal.        Thoracic vasculature: Moderate coronary artery calcification.        Pulmonary vasculature: Normal.        Mediastinal and hilar structures: Normal.        Chest wall, axilla and lower neck: Left side central venous port.        Upper abdomen: Fatty liver.        Osseous structures: DJD.        Additional findings: None seen.        Impression        1.  Left lower lobe mass is not grossly changed. There is generalized   increase in the left perihilar infiltrate from prior exam. Follow-up   suggested.        DICTATING PHYSICIAN:  SAKINA ZABALA MD                   Date Dictated: 07/11/22 1100        Signed By:  SAKINA ZABALA MD <Electronically signed by SAKINA ZABALA MD in OV>    Date Signed:  07/11/22 1107     CC: SARA JAUREGUI NP ; SARA JAUREGUI NP      ADMITTING PHYSICIAN:                                                                                                    ORDERING PHY: SARA JAUREGUI NP                                                                                                                                                      ATTENDING PHY: SARA JAUREGUI NP    Patient Status:  REG CLI    Admit Service Date: 07/11/22                 Accessory Clinical Data:  Chest x-ray:    CT scan:     PFTs:     6MWT:     TTE:    Lab data:    All radiographic imaging of the chest, PFT tracings/data, and 6MWT data have been independently reviewed and interpreted unless otherwise specified.     Assessment and Plan:      Problem List Items Addressed This Visit          Pulmonary    Chronic obstructive pulmonary disease    Current Assessment & Plan     Patient was given samples of trelegy 200. She is to take 1 puff once per day. I told her that if trelegy does not work to call our office so that we can try a different inhaler. If it does work we will work on getting it approved for long term use.     She will need to follow up with a 6MWT. Follow up in 1 month         Relevant Orders    Stress test, pulmonary      Orders Placed This Encounter   Procedures    Stress test, pulmonary     Standing Status:   Future     Number of Occurrences:   1     Standing Expiration Date:   11/29/2023     Order Specific Question:   Reason for study     Answer:   Oxygen prescription          High complexity case.  60 min were spent in reviewing the important data including imaging studies on a different EMR, laboratory data, notes from other consultants and reviewing care with other providers with more than 50% of the time spent face-to-face on counseling, explaining the disease process, progression, importance of compliance with prescribed medications and the importance of follow-up.      Follow up in about 4 weeks (around 12/26/2022) for med change and 6MWT.

## 2022-11-29 NOTE — ASSESSMENT & PLAN NOTE
Patient was given samples of trelegy 200. She is to take 1 puff once per day. I told her that if trelegy does not work to call our office so that we can try a different inhaler. If it does work we will work on getting it approved for long term use.     She will need to follow up with a 6MWT. Follow up in 1 month

## 2022-12-01 ENCOUNTER — PATIENT MESSAGE (OUTPATIENT)
Dept: ADMINISTRATIVE | Facility: HOSPITAL | Age: 57
End: 2022-12-01
Payer: MEDICAID

## 2022-12-05 ENCOUNTER — PATIENT OUTREACH (OUTPATIENT)
Dept: ADMINISTRATIVE | Facility: HOSPITAL | Age: 57
End: 2022-12-05
Payer: MEDICAID

## 2022-12-12 ENCOUNTER — PATIENT MESSAGE (OUTPATIENT)
Dept: ADMINISTRATIVE | Facility: HOSPITAL | Age: 57
End: 2022-12-12
Payer: MEDICAID

## 2022-12-28 ENCOUNTER — TELEPHONE (OUTPATIENT)
Dept: HEMATOLOGY/ONCOLOGY | Facility: CLINIC | Age: 57
End: 2022-12-28
Payer: MEDICAID

## 2022-12-28 NOTE — TELEPHONE ENCOUNTER
Spoke with the patient. She wanted to let us know that she moved her scans to 1/18/23 and her follow up with Smita. Her  is having surgery and does not know if she will be back be fore then. I told her to just keep us updated on if they change. TTRN

## 2022-12-28 NOTE — TELEPHONE ENCOUNTER
----- Message from Shannan Acosta sent at 12/28/2022  3:05 PM CST -----  Contact: SELF  Pt called and asked for a call back from Roxy. Please call [phone]

## 2023-01-11 ENCOUNTER — OFFICE VISIT (OUTPATIENT)
Dept: OBSTETRICS AND GYNECOLOGY | Facility: CLINIC | Age: 58
End: 2023-01-11
Payer: MEDICAID

## 2023-01-11 VITALS
SYSTOLIC BLOOD PRESSURE: 159 MMHG | BODY MASS INDEX: 20.09 KG/M2 | HEIGHT: 66 IN | WEIGHT: 125 LBS | HEART RATE: 105 BPM | DIASTOLIC BLOOD PRESSURE: 92 MMHG

## 2023-01-11 DIAGNOSIS — R30.0 DYSURIA: ICD-10-CM

## 2023-01-11 DIAGNOSIS — Z01.419 WELL WOMAN EXAM WITH ROUTINE GYNECOLOGICAL EXAM: Primary | ICD-10-CM

## 2023-01-11 LAB
AMORPH URATE CRY URNS QL MICRO: NEGATIVE
BACTERIA #/AREA URNS HPF: ABNORMAL /[HPF]
BILIRUB UR QL STRIP: NEGATIVE
CLARITY UR: ABNORMAL
COLOR UR: ABNORMAL
EPITHELIAL CELLS: ABNORMAL
GLUCOSE (UA): NEGATIVE MG/DL
KETONES UR QL STRIP: 150 MG/DL
LEUKOCYTE ESTERASE UR QL STRIP: ABNORMAL
MUCOUS THREADS URNS QL MICRO: ABNORMAL
NITRITE UR QL STRIP: NEGATIVE
OCCULT BLOOD: ABNORMAL
PH, URINE: 6 (ref 5–7.5)
PROT UR QL STRIP: 75 MG/DL
RBC/HPF: ABNORMAL
SP GR UR STRIP: 1.01 (ref 1–1.03)
UROBILINOGEN, URINE: 1 E.U./DL (ref 0–1)
WBC/HPF: ABNORMAL

## 2023-01-11 PROCEDURE — 1159F MED LIST DOCD IN RCRD: CPT | Mod: CPTII,S$GLB,, | Performed by: NURSE PRACTITIONER

## 2023-01-11 PROCEDURE — 99396 PR PREVENTIVE VISIT,EST,40-64: ICD-10-PCS | Mod: S$GLB,,, | Performed by: NURSE PRACTITIONER

## 2023-01-11 PROCEDURE — 3008F BODY MASS INDEX DOCD: CPT | Mod: CPTII,S$GLB,, | Performed by: NURSE PRACTITIONER

## 2023-01-11 PROCEDURE — 3080F DIAST BP >= 90 MM HG: CPT | Mod: CPTII,S$GLB,, | Performed by: NURSE PRACTITIONER

## 2023-01-11 PROCEDURE — 1160F PR REVIEW ALL MEDS BY PRESCRIBER/CLIN PHARMACIST DOCUMENTED: ICD-10-PCS | Mod: CPTII,S$GLB,, | Performed by: NURSE PRACTITIONER

## 2023-01-11 PROCEDURE — 99396 PREV VISIT EST AGE 40-64: CPT | Mod: S$GLB,,, | Performed by: NURSE PRACTITIONER

## 2023-01-11 PROCEDURE — 3077F SYST BP >= 140 MM HG: CPT | Mod: CPTII,S$GLB,, | Performed by: NURSE PRACTITIONER

## 2023-01-11 PROCEDURE — 1159F PR MEDICATION LIST DOCUMENTED IN MEDICAL RECORD: ICD-10-PCS | Mod: CPTII,S$GLB,, | Performed by: NURSE PRACTITIONER

## 2023-01-11 PROCEDURE — 3077F PR MOST RECENT SYSTOLIC BLOOD PRESSURE >= 140 MM HG: ICD-10-PCS | Mod: CPTII,S$GLB,, | Performed by: NURSE PRACTITIONER

## 2023-01-11 PROCEDURE — 1160F RVW MEDS BY RX/DR IN RCRD: CPT | Mod: CPTII,S$GLB,, | Performed by: NURSE PRACTITIONER

## 2023-01-11 PROCEDURE — 3080F PR MOST RECENT DIASTOLIC BLOOD PRESSURE >= 90 MM HG: ICD-10-PCS | Mod: CPTII,S$GLB,, | Performed by: NURSE PRACTITIONER

## 2023-01-11 PROCEDURE — 3008F PR BODY MASS INDEX (BMI) DOCUMENTED: ICD-10-PCS | Mod: CPTII,S$GLB,, | Performed by: NURSE PRACTITIONER

## 2023-01-11 NOTE — PROGRESS NOTES
"    Subjective:       Patient ID: Diana Prather is a 57 y.o. female.    Chief Complaint:  Well Woman and Abdominal Pain      History of Present Illness   Presents for annual gyn exam. History and past labs reviewed with patient.    Complains of RLQ abdomina incomplete bladder emptying and dysuria.   Last pap negative -HPV MMG scheduled next week.  Currently in remission from lung cancer  Plans to quit smoking soon.    recently dxed w/bladder cancer   No LMP recorded (lmp unknown). Patient is postmenopausal.      OB History          3    Para   1    Term   1            AB   2    Living   1         SAB        IAB   2    Ectopic        Multiple        Live Births   1                  Review of Systems  Review of Systems   Constitutional:  Negative for chills and fever.   Respiratory:  Negative for shortness of breath.    Cardiovascular:  Negative for chest pain.   Gastrointestinal:  Positive for abdominal pain and nausea. Negative for blood in stool, constipation, diarrhea, vomiting and reflux.   Genitourinary:  Positive for dysuria, frequency and urgency. Negative for dysmenorrhea, dyspareunia, hematuria, hot flashes, menorrhagia, menstrual problem, pelvic pain, vaginal bleeding, vaginal discharge, postcoital bleeding and vaginal dryness.   Musculoskeletal:  Negative for arthralgias and joint swelling.   Integumentary:  Negative for rash, hair changes, breast mass, nipple discharge and breast skin changes.   Psychiatric/Behavioral:  Negative for depression. The patient is not nervous/anxious.    Breast: Negative for asymmetry, lump, mass, nipple discharge and skin changes        Objective:     Vitals:    23 1318   BP: (!) 159/92   Pulse: 105   Weight: 56.7 kg (125 lb)   Height: 5' 6" (1.676 m)        Physical Exam:   Constitutional: She is oriented to person, place, and time. She appears well-developed and well-nourished.    HENT:   Head: Normocephalic and atraumatic.    Eyes: Pupils " are equal, round, and reactive to light. Conjunctivae are normal.    Neck: No thyromegaly present.    Cardiovascular:  Normal rate, regular rhythm and normal heart sounds.             Pulmonary/Chest: Effort normal and breath sounds normal.        Abdominal: Soft. She exhibits no mass. There is no abdominal tenderness. There is no guarding.     Genitourinary:    Inguinal canal, vagina, uterus, right adnexa and left adnexa normal.      Pelvic exam was performed with patient supine.   The external female genitalia was normal.   Genitalia hair distrobution normal .   Labial bartholins normal.Cervix is normal. Right adnexum displays no mass. Left adnexum displays no mass. No  no vaginal discharge in the vagina. Vaginal atrophy noted. Uterus consistancy normal. and Uerus contour normal  Uterus is not tender.           Musculoskeletal: Normal range of motion and moves all extremeties.       Neurological: She is alert and oriented to person, place, and time. She has normal reflexes.    Skin: Skin is warm and dry.    Psychiatric: She has a normal mood and affect. Her behavior is normal. Judgment and thought content normal.     breast exam wnl- no nipple dc, skin changes, masses or lymph nodes palpated bilaterally    Assessment:     1. Well woman exam with routine gynecological exam    2. Dysuria              Plan:       Well woman exam with routine gynecological exam    Dysuria  -     Urinalysis  -     Urine Culture High Risk         Colonoscopy UTD  Risk assessment for inherited gyn cancer done   Healthy diet, lifestyle, and exercise   OTC Women's multi vitamin   KA for MMG  Follow up in about 1 year (around 1/11/2024), or if symptoms worsen or fail to improve.

## 2023-01-12 ENCOUNTER — OFFICE VISIT (OUTPATIENT)
Dept: PULMONOLOGY | Facility: CLINIC | Age: 58
End: 2023-01-12
Payer: MEDICAID

## 2023-01-12 ENCOUNTER — CLINICAL SUPPORT (OUTPATIENT)
Dept: PULMONOLOGY | Facility: CLINIC | Age: 58
End: 2023-01-12
Payer: MEDICAID

## 2023-01-12 VITALS
HEIGHT: 66 IN | BODY MASS INDEX: 20.09 KG/M2 | RESPIRATION RATE: 16 BRPM | BODY MASS INDEX: 19.93 KG/M2 | HEART RATE: 96 BPM | SYSTOLIC BLOOD PRESSURE: 121 MMHG | OXYGEN SATURATION: 95 % | WEIGHT: 124 LBS | DIASTOLIC BLOOD PRESSURE: 84 MMHG | WEIGHT: 125 LBS | HEIGHT: 66 IN

## 2023-01-12 DIAGNOSIS — J44.9 CHRONIC OBSTRUCTIVE PULMONARY DISEASE, UNSPECIFIED COPD TYPE: ICD-10-CM

## 2023-01-12 PROCEDURE — 99214 PR OFFICE/OUTPT VISIT, EST, LEVL IV, 30-39 MIN: ICD-10-PCS | Mod: 25,S$GLB,,

## 2023-01-12 PROCEDURE — 3074F PR MOST RECENT SYSTOLIC BLOOD PRESSURE < 130 MM HG: ICD-10-PCS | Mod: CPTII,S$GLB,,

## 2023-01-12 PROCEDURE — 3074F SYST BP LT 130 MM HG: CPT | Mod: CPTII,S$GLB,,

## 2023-01-12 PROCEDURE — 3079F DIAST BP 80-89 MM HG: CPT | Mod: CPTII,S$GLB,,

## 2023-01-12 PROCEDURE — 1159F PR MEDICATION LIST DOCUMENTED IN MEDICAL RECORD: ICD-10-PCS | Mod: CPTII,S$GLB,,

## 2023-01-12 PROCEDURE — 1159F MED LIST DOCD IN RCRD: CPT | Mod: CPTII,S$GLB,,

## 2023-01-12 PROCEDURE — 94618 PULMONARY STRESS TESTING: CPT | Mod: S$GLB,,, | Performed by: INTERNAL MEDICINE

## 2023-01-12 PROCEDURE — 3079F PR MOST RECENT DIASTOLIC BLOOD PRESSURE 80-89 MM HG: ICD-10-PCS | Mod: CPTII,S$GLB,,

## 2023-01-12 PROCEDURE — 3008F PR BODY MASS INDEX (BMI) DOCUMENTED: ICD-10-PCS | Mod: CPTII,S$GLB,,

## 2023-01-12 PROCEDURE — 3008F BODY MASS INDEX DOCD: CPT | Mod: CPTII,S$GLB,,

## 2023-01-12 PROCEDURE — 94618 PULMONARY STRESS TESTING: ICD-10-PCS | Mod: S$GLB,,, | Performed by: INTERNAL MEDICINE

## 2023-01-12 PROCEDURE — 99214 OFFICE O/P EST MOD 30 MIN: CPT | Mod: 25,S$GLB,,

## 2023-01-12 RX ORDER — ALBUTEROL SULFATE 108 UG/1
2 AEROSOL, METERED RESPIRATORY (INHALATION) EVERY 4 HOURS PRN
Qty: 6.7 G | Refills: 2 | Status: SHIPPED | OUTPATIENT
Start: 2023-01-12 | End: 2023-04-12

## 2023-01-12 RX ORDER — FLUTICASONE FUROATE, UMECLIDINIUM BROMIDE AND VILANTEROL TRIFENATATE 200; 62.5; 25 UG/1; UG/1; UG/1
1 POWDER RESPIRATORY (INHALATION) DAILY
Qty: 1 EACH | Refills: 6 | Status: SHIPPED | OUTPATIENT
Start: 2023-01-12 | End: 2023-08-09 | Stop reason: ALTCHOICE

## 2023-01-12 RX ORDER — IPRATROPIUM BROMIDE AND ALBUTEROL SULFATE 2.5; .5 MG/3ML; MG/3ML
3 SOLUTION RESPIRATORY (INHALATION) EVERY 6 HOURS PRN
Qty: 75 ML | Refills: 0 | Status: SHIPPED | OUTPATIENT
Start: 2023-01-12 | End: 2024-01-12

## 2023-01-12 NOTE — PROCEDURES
"[facility] - Pulmonary Function  Six Minute Walk     SUMMARY     Ordering Provider: Dr. Gonzalez   Interpreting Provider: Dr. Gonzalez  Performing nurse/tech/RT: Lucía RRT  Diagnosis: COPD  Height: 5' 6" (167.6 cm)  Weight: 56.7 kg (125 lb)  BMI (Calculated): 20.2   Patient Race:             Phase Oxygen Assessment Supplemental O2 Heart   Rate Blood Pressure Dennis Dyspnea Scale Rating   Resting 97 % Room Air 92 bpm 123/84 2   Exercise        Minute        1 93 % Room Air 104 bpm     2 94 % Room Air 103 bpm     3 94 % Room Air 102 bpm     4 95 % Room Air 105 bpm     5 94 % Room Air 102 bpm     6  96 % Room Air 103 bpm 134/76 3   Recovery        Minute        1 98 % Room Air 104 bpm     2 98 % Room Air 100 bpm     3 98 % Room Air 99 bpm     4 98 % Room Air 98 bpm 122/88 2     Six Minute Walk Summary  6MWT Status: completed without stopping  Patient Reported: Other (Comment) (back and hip pain)     Interpretation:  Did the patient stop or pause?: No                                         Total Time Walked (Calculated): 360 seconds  Final Partial Lap Distance (feet): 150 feet  Total Distance Meters (Calculated): 350.52 meters  Predicted Distance Meters (Calculated): 549.77 meters  Percentage of Predicted (Calculated): 63.76  Peak VO2 (Calculated): 14.5  Mets: 4.14  Has The Patient Had a Previous Six Minute Walk Test?: No       Previous 6MWT Results  Has The Patient Had a Previous Six Minute Walk Test?: No    Physician Interpretation:  No exertional hypoxia. Patient walked 350 meters (63% of predicted distance) in 6 minutes.  -Zuly Gonzalez MD    "

## 2023-01-13 LAB — URINE CULTURE, ROUTINE: NORMAL

## 2023-01-13 NOTE — ASSESSMENT & PLAN NOTE
Continue with Trelegy 200. She was given 2 samples from our office. [facility].    We will order her a nebulizer machine with DuoNeb solution to be used every 4-6 hours as needed.
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
## 2023-02-08 ENCOUNTER — PATIENT OUTREACH (OUTPATIENT)
Dept: ADMINISTRATIVE | Facility: HOSPITAL | Age: 58
End: 2023-02-08
Payer: MEDICAID

## 2023-02-08 ENCOUNTER — TELEPHONE (OUTPATIENT)
Dept: PRIMARY CARE CLINIC | Facility: CLINIC | Age: 58
End: 2023-02-08
Payer: MEDICAID

## 2023-02-08 VITALS — SYSTOLIC BLOOD PRESSURE: 136 MMHG | DIASTOLIC BLOOD PRESSURE: 90 MMHG

## 2023-02-08 NOTE — PROGRESS NOTES
Pt returned call, remote bp today 136/90, agreed to scheduling, 6mo follow up scheduled 05/12/23 with Dr Mirian Robertson.

## 2023-02-08 NOTE — PROGRESS NOTES
HTN Report: Attempted to contact pt in regards to remote bp and offer follow up scheduling with provider, no ans, lvm.

## 2023-02-16 LAB — BCS RECOMMENDATION EXT: NORMAL

## 2023-02-28 ENCOUNTER — PATIENT MESSAGE (OUTPATIENT)
Dept: PRIMARY CARE CLINIC | Facility: CLINIC | Age: 58
End: 2023-02-28
Payer: MEDICAID

## 2023-03-31 ENCOUNTER — PATIENT MESSAGE (OUTPATIENT)
Dept: FAMILY MEDICINE | Facility: CLINIC | Age: 58
End: 2023-03-31
Payer: MEDICAID

## 2023-05-09 ENCOUNTER — TELEPHONE (OUTPATIENT)
Dept: SMOKING CESSATION | Facility: CLINIC | Age: 58
End: 2023-05-09
Payer: MEDICAID

## 2023-05-09 ENCOUNTER — PATIENT OUTREACH (OUTPATIENT)
Dept: ADMINISTRATIVE | Facility: HOSPITAL | Age: 58
End: 2023-05-09
Payer: MEDICAID

## 2023-05-09 NOTE — PROGRESS NOTES
Working Mammogram Report:     Pt overdue for mammogram. Called to offer scheduling.  Uploaded Mammogram from 02/16/2023 from [facility] everywhere.

## 2023-05-09 NOTE — TELEPHONE ENCOUNTER
Spoke with patient after being notified through basket messages that patient requested a call back.  Message stated that patient needed a refill for Wellbutrin.  Patient stated that she needs a refill for Trelegy and was going to request in from her primary care physician during her next scheduled appointment Friday.

## 2023-05-12 ENCOUNTER — OFFICE VISIT (OUTPATIENT)
Dept: PRIMARY CARE CLINIC | Facility: CLINIC | Age: 58
End: 2023-05-12
Payer: MEDICAID

## 2023-05-12 VITALS
HEIGHT: 66 IN | DIASTOLIC BLOOD PRESSURE: 90 MMHG | OXYGEN SATURATION: 97 % | WEIGHT: 119.81 LBS | HEART RATE: 98 BPM | BODY MASS INDEX: 19.25 KG/M2 | SYSTOLIC BLOOD PRESSURE: 121 MMHG

## 2023-05-12 DIAGNOSIS — R11.0 NAUSEA: ICD-10-CM

## 2023-05-12 DIAGNOSIS — G25.81 RESTLESS LEG: ICD-10-CM

## 2023-05-12 DIAGNOSIS — R11.2 NAUSEA AND VOMITING, UNSPECIFIED VOMITING TYPE: Primary | ICD-10-CM

## 2023-05-12 DIAGNOSIS — C34.90 SMALL CELL LUNG CANCER: ICD-10-CM

## 2023-05-12 PROCEDURE — 3074F PR MOST RECENT SYSTOLIC BLOOD PRESSURE < 130 MM HG: ICD-10-PCS | Mod: CPTII,S$GLB,, | Performed by: INTERNAL MEDICINE

## 2023-05-12 PROCEDURE — 3008F BODY MASS INDEX DOCD: CPT | Mod: CPTII,S$GLB,, | Performed by: INTERNAL MEDICINE

## 2023-05-12 PROCEDURE — 3080F PR MOST RECENT DIASTOLIC BLOOD PRESSURE >= 90 MM HG: ICD-10-PCS | Mod: CPTII,S$GLB,, | Performed by: INTERNAL MEDICINE

## 2023-05-12 PROCEDURE — 1159F MED LIST DOCD IN RCRD: CPT | Mod: CPTII,S$GLB,, | Performed by: INTERNAL MEDICINE

## 2023-05-12 PROCEDURE — 1159F PR MEDICATION LIST DOCUMENTED IN MEDICAL RECORD: ICD-10-PCS | Mod: CPTII,S$GLB,, | Performed by: INTERNAL MEDICINE

## 2023-05-12 PROCEDURE — 99214 OFFICE O/P EST MOD 30 MIN: CPT | Mod: S$GLB,,, | Performed by: INTERNAL MEDICINE

## 2023-05-12 PROCEDURE — 3008F PR BODY MASS INDEX (BMI) DOCUMENTED: ICD-10-PCS | Mod: CPTII,S$GLB,, | Performed by: INTERNAL MEDICINE

## 2023-05-12 PROCEDURE — 3074F SYST BP LT 130 MM HG: CPT | Mod: CPTII,S$GLB,, | Performed by: INTERNAL MEDICINE

## 2023-05-12 PROCEDURE — 3080F DIAST BP >= 90 MM HG: CPT | Mod: CPTII,S$GLB,, | Performed by: INTERNAL MEDICINE

## 2023-05-12 PROCEDURE — 99214 PR OFFICE/OUTPT VISIT, EST, LEVL IV, 30-39 MIN: ICD-10-PCS | Mod: S$GLB,,, | Performed by: INTERNAL MEDICINE

## 2023-05-12 RX ORDER — GABAPENTIN 400 MG/1
400 CAPSULE ORAL 3 TIMES DAILY
Qty: 90 CAPSULE | Refills: 11 | Status: SHIPPED | OUTPATIENT
Start: 2023-05-12 | End: 2024-05-11

## 2023-05-12 RX ORDER — FAMOTIDINE 20 MG/1
20 TABLET, FILM COATED ORAL 2 TIMES DAILY
Qty: 60 TABLET | Refills: 11 | Status: SHIPPED | OUTPATIENT
Start: 2023-05-12 | End: 2024-03-06

## 2023-05-12 RX ORDER — PROMETHAZINE HYDROCHLORIDE 25 MG/1
TABLET ORAL
Qty: 30 TABLET | Refills: 3 | Status: SHIPPED | OUTPATIENT
Start: 2023-05-12 | End: 2023-08-14 | Stop reason: ALTCHOICE

## 2023-05-12 RX ORDER — AMITRIPTYLINE HYDROCHLORIDE 25 MG/1
25 TABLET, FILM COATED ORAL NIGHTLY PRN
Qty: 30 TABLET | Refills: 3 | Status: SHIPPED | OUTPATIENT
Start: 2023-05-12 | End: 2023-09-05

## 2023-05-12 NOTE — PROGRESS NOTES
Subjective:      Patient ID: Diana Prather is a 57 y.o. female.    Chief Complaint: Follow-up, Nausea, and leg cramps    HPI      Past Medical History:   Diagnosis Date    Allergy     Esophagitis     Fatty liver 08/29/2022    Diagnosed by Ultrasound which was order by Dr. Castillo.    GERD (gastroesophageal reflux disease)     Hiatal hernia     Schatzki's ring of distal esophagus     Small cell lung cancer 11/15/2021       Patient is here for follow up. She has a h/o small cell lung cancer incidentally detected on CT lung cancer screening     COPD diagnosed due to chronic smoking history and is under the care of Pulmonology Sim [facility] and is on Trelegy with albuterol rescue inhaler    She has a h/o  long standing nausea and her appetite is not what it used to be. She is on protonix. She continues to lose weight    She states she is very busy taking care of her  who is also ill  Her gastroenterologist is Dr Birmingham and she has had several testing done  Abdominal US done showed fatty liver and contracted GB, HIDA scan was normal. CT is august showed fatty liver and some mild diverticulitis. She has had EGD and colonoscopy done by Dr Birmingham    She states zofran does not help with her nausea and states phenergen helps more. She does try marijuana once in a while which does help as well    She would also like her gabapentin to be increased            Review of Systems   Constitutional:  Positive for malaise/fatigue and weight loss. Negative for chills and fever.   HENT:  Negative for hearing loss.    Eyes:  Negative for blurred vision.   Respiratory:  Positive for cough. Negative for shortness of breath and wheezing.    Cardiovascular:  Negative for chest pain, palpitations and leg swelling.   Gastrointestinal:  Positive for nausea. Negative for abdominal pain, blood in stool, constipation, diarrhea, melena and vomiting.   Genitourinary:  Negative for dysuria, frequency and urgency.   Musculoskeletal:   "Negative for falls.   Skin:  Negative for rash.   Neurological:  Negative for dizziness and headaches.   Endo/Heme/Allergies:  Does not bruise/bleed easily.   Psychiatric/Behavioral:  Negative for depression. The patient is nervous/anxious.    Objective:     Physical Exam  Vitals reviewed.   Constitutional:       Appearance: Normal appearance. She is ill-appearing.      Comments: Losing weight   HENT:      Head: Normocephalic.      Mouth/Throat:      Mouth: Mucous membranes are moist.      Pharynx: Oropharynx is clear.   Eyes:      Extraocular Movements: Extraocular movements intact.      Conjunctiva/sclera: Conjunctivae normal.      Pupils: Pupils are equal, round, and reactive to light.   Cardiovascular:      Rate and Rhythm: Normal rate and regular rhythm.   Pulmonary:      Effort: Pulmonary effort is normal.      Breath sounds: Normal breath sounds.   Abdominal:      General: Bowel sounds are normal.   Musculoskeletal:      Right lower leg: No edema.      Left lower leg: No edema.   Skin:     General: Skin is warm.      Capillary Refill: Capillary refill takes less than 2 seconds.   Neurological:      General: No focal deficit present.      Mental Status: She is alert.   Psychiatric:         Mood and Affect: Mood normal.     BP (!) 121/90 (BP Location: Right arm, Patient Position: Sitting, BP Method: Medium (Automatic))   Pulse 98   Ht 5' 6" (1.676 m)   Wt 54.3 kg (119 lb 12.8 oz)   LMP  (LMP Unknown)   SpO2 97%   BMI 19.34 kg/m²     Assessment:       ICD-10-CM ICD-9-CM   1. Nausea and vomiting, unspecified vomiting type  R11.2 787.01   2. Small cell lung cancer  C34.90 162.9   3. Nausea  R11.0 787.02   4. Restless leg  G25.81 333.94       Plan:     Medication List with Changes/Refills   New Medications    AMITRIPTYLINE (ELAVIL) 25 MG TABLET    Take 1 tablet (25 mg total) by mouth nightly as needed for Pain.    GABAPENTIN (NEURONTIN) 400 MG CAPSULE    Take 1 capsule (400 mg total) by mouth 3 (three) times " daily.   Current Medications    ALBUTEROL (PROVENTIL/VENTOLIN HFA) 90 MCG/ACTUATION INHALER    INHALE 2 PUFFS INTO THE LUNGS EVERY 6 HOURS AS NEEDED FOR WHEEZING, RESCUE    ALBUTEROL-IPRATROPIUM (DUO-NEB) 2.5 MG-0.5 MG/3 ML NEBULIZER SOLUTION    Take 3 mLs by nebulization every 6 (six) hours as needed for Wheezing. Rescue    CETIRIZINE (ZYRTEC) 10 MG TABLET    Take 1 tablet (10 mg total) by mouth once daily.    FLUTICASONE PROPIONATE (FLONASE) 50 MCG/ACTUATION NASAL SPRAY    1 spray (50 mcg total) by Each Nostril route once daily.    FLUTICASONE-UMECLIDIN-VILANTER (TRELEGY ELLIPTA) 200-62.5-25 MCG INHALER    Inhale 1 puff into the lungs once daily.    LIDOCAINE VISCOUS 2 % SOLUTION        ONDANSETRON (ZOFRAN-ODT) 8 MG TBDL    Take 1 tablet (8 mg total) by mouth every 6 (six) hours as needed.    PANTOPRAZOLE (PROTONIX) 40 MG TABLET    Take 1 tablet (40 mg total) by mouth once daily.   Changed and/or Refilled Medications    Modified Medication Previous Medication    FAMOTIDINE (PEPCID) 20 MG TABLET famotidine (PEPCID) 20 MG tablet       Take 1 tablet (20 mg total) by mouth 2 (two) times daily.    Take 1 tablet (20 mg total) by mouth 2 (two) times daily.    PROMETHAZINE (PHENERGAN) 25 MG TABLET promethazine (PHENERGAN) 25 MG tablet       TAKE 1 TABLET(25 MG) BY MOUTH EVERY 8 HOURS Strength: 25 mg    TAKE 1 TABLET(25 MG) BY MOUTH EVERY 8 HOURS Strength: 25 mg   Discontinued Medications    AMITRIPTYLINE (ELAVIL) 10 MG TABLET    TAKE 1 TABLET (10 MG TOTAL) BY MOUTH EVERY EVENING.    GABAPENTIN (NEURONTIN) 300 MG CAPSULE    TAKE 1 CAPSULE BY MOUTH TWICE DAILY    MEMANTINE (NAMENDA) 5 MG TAB    Take 5 mg by mouth once daily at 6am.        1. Nausea and vomiting, unspecified vomiting type  -     famotidine (PEPCID) 20 MG tablet; Take 1 tablet (20 mg total) by mouth 2 (two) times daily.  Dispense: 60 tablet; Refill: 11  -     promethazine (PHENERGAN) 25 MG tablet; TAKE 1 TABLET(25 MG) BY MOUTH EVERY 8 HOURS Strength: 25 mg   Dispense: 30 tablet; Refill: 3  -     amitriptyline (ELAVIL) 25 MG tablet; Take 1 tablet (25 mg total) by mouth nightly as needed for Pain.  Dispense: 30 tablet; Refill: 3  -     Ambulatory referral/consult to Gastroenterology; Future; Expected date: 05/19/2023    2. Small cell lung cancer  -     promethazine (PHENERGAN) 25 MG tablet; TAKE 1 TABLET(25 MG) BY MOUTH EVERY 8 HOURS Strength: 25 mg  Dispense: 30 tablet; Refill: 3    3. Nausea    4. Restless leg  -     gabapentin (NEURONTIN) 400 MG capsule; Take 1 capsule (400 mg total) by mouth 3 (three) times daily.  Dispense: 90 capsule; Refill: 11           Future Appointments   Date Time Provider Department Center   7/13/2023 10:00 AM Krystin Gomez NP [facility] PULM  [place]   8/14/2023  1:00 PM Mirian Robertson MD [facility] PRICG5 [facility]   1/16/2024  1:30 PM Darby Lind NP [facility] [id number] [place]

## 2023-05-17 RX ORDER — SODIUM CHLORIDE 0.9 % (FLUSH) 0.9 %
10 SYRINGE (ML) INJECTION
OUTPATIENT
Start: 2023-05-17

## 2023-05-17 RX ORDER — HEPARIN 100 UNIT/ML
500 SYRINGE INTRAVENOUS
OUTPATIENT
Start: 2023-05-17

## 2023-05-24 ENCOUNTER — TELEPHONE (OUTPATIENT)
Dept: HEMATOLOGY/ONCOLOGY | Facility: CLINIC | Age: 58
End: 2023-05-24
Payer: MEDICAID

## 2023-05-24 NOTE — TELEPHONE ENCOUNTER
Called the patient to schedule a follow up in the clinic. Patient did not answer. I was able to leave a Vm with my direct number requesting a call back. TTRN

## 2023-06-01 ENCOUNTER — OFFICE VISIT (OUTPATIENT)
Dept: HEMATOLOGY/ONCOLOGY | Facility: CLINIC | Age: 58
End: 2023-06-01
Payer: MEDICAID

## 2023-06-01 VITALS
OXYGEN SATURATION: 98 % | HEART RATE: 62 BPM | DIASTOLIC BLOOD PRESSURE: 89 MMHG | HEIGHT: 66 IN | WEIGHT: 120.31 LBS | TEMPERATURE: 98 F | SYSTOLIC BLOOD PRESSURE: 137 MMHG | RESPIRATION RATE: 16 BRPM | BODY MASS INDEX: 19.33 KG/M2

## 2023-06-01 DIAGNOSIS — R63.4 WEIGHT LOSS: ICD-10-CM

## 2023-06-01 DIAGNOSIS — C34.90 SMALL CELL LUNG CANCER: Primary | ICD-10-CM

## 2023-06-01 DIAGNOSIS — F32.A DEPRESSION, UNSPECIFIED DEPRESSION TYPE: ICD-10-CM

## 2023-06-01 PROCEDURE — 3008F BODY MASS INDEX DOCD: CPT | Mod: CPTII,S$GLB,, | Performed by: NURSE PRACTITIONER

## 2023-06-01 PROCEDURE — 3075F PR MOST RECENT SYSTOLIC BLOOD PRESS GE 130-139MM HG: ICD-10-PCS | Mod: CPTII,S$GLB,, | Performed by: NURSE PRACTITIONER

## 2023-06-01 PROCEDURE — 3079F PR MOST RECENT DIASTOLIC BLOOD PRESSURE 80-89 MM HG: ICD-10-PCS | Mod: CPTII,S$GLB,, | Performed by: NURSE PRACTITIONER

## 2023-06-01 PROCEDURE — 99215 OFFICE O/P EST HI 40 MIN: CPT | Mod: S$GLB,,, | Performed by: NURSE PRACTITIONER

## 2023-06-01 PROCEDURE — 3075F SYST BP GE 130 - 139MM HG: CPT | Mod: CPTII,S$GLB,, | Performed by: NURSE PRACTITIONER

## 2023-06-01 PROCEDURE — 1159F MED LIST DOCD IN RCRD: CPT | Mod: CPTII,S$GLB,, | Performed by: NURSE PRACTITIONER

## 2023-06-01 PROCEDURE — 1159F PR MEDICATION LIST DOCUMENTED IN MEDICAL RECORD: ICD-10-PCS | Mod: CPTII,S$GLB,, | Performed by: NURSE PRACTITIONER

## 2023-06-01 PROCEDURE — 99215 PR OFFICE/OUTPT VISIT, EST, LEVL V, 40-54 MIN: ICD-10-PCS | Mod: S$GLB,,, | Performed by: NURSE PRACTITIONER

## 2023-06-01 PROCEDURE — 3008F PR BODY MASS INDEX (BMI) DOCUMENTED: ICD-10-PCS | Mod: CPTII,S$GLB,, | Performed by: NURSE PRACTITIONER

## 2023-06-01 PROCEDURE — 3079F DIAST BP 80-89 MM HG: CPT | Mod: CPTII,S$GLB,, | Performed by: NURSE PRACTITIONER

## 2023-06-01 RX ORDER — SERTRALINE HYDROCHLORIDE 50 MG/1
50 TABLET, FILM COATED ORAL DAILY
Qty: 30 TABLET | Refills: 2 | Status: SHIPPED | OUTPATIENT
Start: 2023-06-01 | End: 2023-06-30 | Stop reason: SDUPTHER

## 2023-06-01 NOTE — PROGRESS NOTES
MEDICAL ONCOLOGY FOLLOW UP CONSULTATION NOTE    Chief Complaint: Small Cell Lung Cancer    HPI: Patient is a 56 year old female current smoker who underwent screening CT scan chest by her PCP which showed a suspicious mass arising from the left lower lobe with bronchial obstruction highly suspicious for malignancy. She presents to our clinic today for further evaluation. Denies weight loss, activity and appetite changes. Does complain of cough which she attributes it to smoking and says it is chronic with no changes.     Imaging:      Ct Chest 2/10/22:  Left lower mass significantly decreased.  Measures 4.1 x 3.2 x 4.3 cm compared to pretreatment dimensions of 10 cm on CT scan September 27, 2021 and PET-CT on October 11, 2021.        CT scan w/o contrast: 9/27/2021    1.  Category 4x: Very suspicious lesion. Large soft tissue mass effect in   the left lower lobe associated with bronchial obstruction highly suspicious    for malignancy.      Chest CT with or without contrast, CT/PET and/or tissue sampling depending   on the probability of malignancy and comorbidities. Consultation with   pulmonology is also recommended.         PET scan: 10/11/2021  == Large left lower lobe hypermetabolic pass consistent with malignancy.  It has a maximum            diameter on the CT of approximately 10 cm it shows intense radiotracer uptake with the SUV being 17.7      DIAGNOSIS:   11/12/2021 BOWERS/kml   LUNG, LEFT LOWER LOBE, EBUS-FNA:   -- SMALL CELL CARCINOMA.   -- SEE COMMENT.   Comment:   A panel of immunohistochemical stains are performed on block 1A to further   characterize the neoplasm.  The tumor cells are positive for pancytokeratin,   CK7, CK8/18, and CD56.  The cells of interest are negative for P40,   synaptophysin, TTF1, CD3, CD20, CK20, and S100.  The CK7 demonstrates some   dot-like pattern expression.  Controls performed as expected.  The above   findings support the diagnosis of small cell carcinoma.              Labs:  Lab Results   Component Value Date    WBC 7.4 06/13/2022    HGB 15.0 06/13/2022    HCT 43.6 06/13/2022    .9 (H) 06/13/2022    LABPLAT 265 06/13/2022      Platelets   Date Value Ref Range Status   06/13/2022 265 142 - 424 10*3/uL Final     CMP  Sodium   Date Value Ref Range Status   06/13/2022 138 135 - 145 mmol/L Final     Potassium   Date Value Ref Range Status   06/13/2022 4.2 3.6 - 5.2 mmol/L Final     Chloride   Date Value Ref Range Status   06/13/2022 101 100 - 108 mmol/L Final     CO2   Date Value Ref Range Status   06/13/2022 27 21 - 32 mmol/L Final     Glucose   Date Value Ref Range Status   06/13/2022 128 (H) 70 - 110 mg/dL Final     BUN   Date Value Ref Range Status   06/13/2022 3 (L) 7 - 18 mg/dL Final     Creatinine   Date Value Ref Range Status   06/13/2022 0.69 0.55 - 1.02 mg/dL Final     Calcium   Date Value Ref Range Status   06/13/2022 8.8 8.8 - 10.5 mg/dL Final     Total Protein   Date Value Ref Range Status   06/13/2022 7.6 6.4 - 8.2 g/dL Final     Albumin   Date Value Ref Range Status   06/13/2022 3.7 3.4 - 5.0 g/dL Final     Total Bilirubin   Date Value Ref Range Status   06/13/2022 0.5 0.0 - 1.0 mg/dL Final     Alkaline Phosphatase   Date Value Ref Range Status   06/13/2022 128 (H) 46 - 116 U/L Final     AST   Date Value Ref Range Status   06/13/2022 37 15 - 37 U/L Final     ALT   Date Value Ref Range Status   06/13/2022 39 12 - 78 U/L Final     Anion Gap   Date Value Ref Range Status   06/13/2022 10.0 3.0 - 11.0 mmol/L Final     eGFR if    Date Value Ref Range Status   09/17/2021 124 > OR = 60 mL/min/1.73m2 Final     eGFR if non    Date Value Ref Range Status   09/17/2021 107 > OR = 60 mL/min/1.73m2 Final            Past Medical History:   Diagnosis Date    Allergy     Esophagitis     Fatty liver 08/29/2022    Diagnosed by Ultrasound which was order by Dr. Castillo.    GERD (gastroesophageal reflux disease)     Hiatal hernia      Schatzki's ring of distal esophagus     Small cell lung cancer 11/15/2021     Family History   Problem Relation Age of Onset    Emphysema Mother     Diabetes Mother     Diabetes Father     Alcohol abuse Father     No Known Problems Sister     No Known Problems Brother     No Known Problems Maternal Aunt     No Known Problems Maternal Uncle     No Known Problems Paternal Aunt     No Known Problems Paternal Uncle     No Known Problems Maternal Grandmother     No Known Problems Maternal Grandfather     No Known Problems Paternal Grandmother     No Known Problems Paternal Grandfather     No Known Problems Daughter      Social History     Socioeconomic History    Marital status:    Tobacco Use    Smoking status: Every Day     Packs/day: 0.50     Years: 40.00     Pack years: 20.00     Types: Cigarettes    Smokeless tobacco: Never   Substance and Sexual Activity    Alcohol use: Yes     Alcohol/week: 10.0 standard drinks     Types: 10 Cans of beer per week    Drug use: Never    Sexual activity: Yes     Partners: Male     Birth control/protection: None     Past Surgical History:   Procedure Laterality Date    COLONOSCOPY       in [place] and he did the dialation     COLONOSCOPY  08/17/2022    ESOPHAGEAL DILATION      ESOPHAGOGASTRODUODENOSCOPY  08/10/2022    SPINAL FUSION      neck         Review of systems:  Review of Systems   Constitutional:  Negative for activity change, appetite change, chills, diaphoresis, fatigue and unexpected weight change.   HENT:  Negative for congestion, facial swelling, hearing loss, mouth sores, trouble swallowing and voice change.    Eyes:  Negative for photophobia, pain, discharge and itching.   Respiratory:  Negative for apnea, cough, choking, chest tightness, shortness of breath, wheezing and stridor.    Cardiovascular:  Negative for chest pain, palpitations and leg swelling.   Gastrointestinal:  Positive for nausea. Negative for abdominal distention, abdominal pain, anal  bleeding and blood in stool.   Endocrine: Negative for cold intolerance, heat intolerance, polydipsia and polyphagia.   Genitourinary:  Negative for difficulty urinating, dysuria, flank pain and hematuria.   Musculoskeletal:  Negative for arthralgias, back pain, joint swelling, myalgias, neck pain and neck stiffness.        +ve for joint pain   Skin:  Positive for rash. Negative for color change, pallor and wound.   Allergic/Immunologic: Negative for environmental allergies, food allergies and immunocompromised state.   Neurological:  Positive for headaches. Negative for dizziness, seizures, facial asymmetry, speech difficulty, light-headedness and numbness.   Hematological:  Negative for adenopathy. Does not bruise/bleed easily.   Psychiatric/Behavioral:  Negative for agitation, behavioral problems, confusion, decreased concentration and sleep disturbance.    All other systems reviewed and are negative.    Physical Exam  Vitals and nursing note reviewed.   Constitutional:       General: She is not in acute distress.     Appearance: Normal appearance. She is not ill-appearing.   HENT:      Head: Normocephalic and atraumatic.      Nose: No congestion or rhinorrhea.   Eyes:      General: No scleral icterus.     Extraocular Movements: Extraocular movements intact.      Pupils: Pupils are equal, round, and reactive to light.   Cardiovascular:      Rate and Rhythm: Normal rate and regular rhythm.      Pulses: Normal pulses.      Heart sounds: Normal heart sounds. No murmur heard.    No gallop.   Pulmonary:      Effort: Pulmonary effort is normal. No respiratory distress.      Breath sounds: Normal breath sounds. No stridor. No wheezing or rhonchi.      Comments: +ve for coarse breath sounds  Abdominal:      General: Bowel sounds are normal. There is no distension.      Palpations: There is no mass.      Tenderness: There is no abdominal tenderness. There is no guarding.   Musculoskeletal:         General: No swelling,  tenderness, deformity or signs of injury. Normal range of motion.      Cervical back: Normal range of motion and neck supple. No rigidity. No muscular tenderness.      Right lower leg: No edema.      Left lower leg: No edema.   Skin:     General: Skin is warm and dry.      Coloration: Skin is not jaundiced or pale.      Findings: Rash present. No bruising or lesion.   Neurological:      General: No focal deficit present.      Mental Status: She is alert and oriented to person, place, and time.      Cranial Nerves: No cranial nerve deficit.      Sensory: No sensory deficit.      Motor: No weakness.      Gait: Gait normal.   Psychiatric:         Mood and Affect: Mood normal.         Behavior: Behavior normal.         Thought Content: Thought content normal.     Vitals:    06/01/23 1259   BP: 137/89   Pulse: 62   Resp: 16   Temp: 97.6 °F (36.4 °C)   Body surface area is 1.59 meters squared.  Imaging:                MRI Brain 7/5/22:        Assessment/Plan:      ECOG 1    1. Limited Stage Small cell carcinoma: [id number], Stage IIIA    == Diagnosis via Left lower lobe EBUS: FNA.  == I do not feel resectability would be an option which is the preferred option per NCCN guidelines., looking at the size and location. No evidence of metastatic disease on imaging. However, on the last chest X-ray  she was noted to have suspected left sided effusion, this did not correlate with PET scan but obviously concerning.  She would like to seek a second opinion at [facility] and  I discussed this with her that while she does this, she should not delay start of her treatment considering the size of the mass. Our plan should be for concurrent chemoradiation.  12/21- 4/22 : Carbo/Etoposide + XRT completed,   04/05/2022: PET/CT showed partial response to treatment.   5/27/2022 completed prophylactic whole brain radiation  7/22/22: review of recent CT C/A/P shows stable disease with a 3 cm LLL mass noted with no significant change as compared  to 2/2022,  10/28/22: review of recent PET shows stable disease with decrease size and SUV of previous lung lesion. She reports weight loss, chronic nausea, fatigue, cough and SOB. Will reach out to pcp r/g weight loss and nausea, will add phenergan as well. Advised to f/u with pulm for chronic cough and SOB, will send out medrol dose pack.   6/1/23: doing ok, some weight loss due to chronic nausea, depression and fatigue. Requesting to try anit-depression medication, will send out zoloft and refer to nutritional services. Ambu ref to surgery for port removal     To Do:   ==RTC in 1 month in [place] for med check         Total time spent in counseling and discussion about further management options including relevant lab work, treatment,  prognosis, medications and intended side effects was more than 45 minutes. More than 50% of the time was spent on counseling and coordination of care.  I spent a total of 45 minutes on the day of the visit.This includes face to face time and non-face to face time preparing to see the patient (eg, review of tests), Obtaining and/or reviewing separately obtained history, Documenting clinical information in the electronic or other health record, Independently interpreting resultsand communicating results to the patient/family/caregiver, or Care coordination.

## 2023-06-05 DIAGNOSIS — C34.90 SMALL CELL LUNG CANCER: Primary | ICD-10-CM

## 2023-06-07 ENCOUNTER — OFFICE VISIT (OUTPATIENT)
Dept: SURGERY | Facility: CLINIC | Age: 58
End: 2023-06-07
Payer: MEDICAID

## 2023-06-07 DIAGNOSIS — Z85.118 PERSONAL HISTORY OF LUNG CANCER: Primary | ICD-10-CM

## 2023-06-07 DIAGNOSIS — C34.90 SMALL CELL LUNG CANCER: ICD-10-CM

## 2023-06-07 PROCEDURE — 1159F PR MEDICATION LIST DOCUMENTED IN MEDICAL RECORD: ICD-10-PCS | Mod: CPTII,S$GLB,, | Performed by: SURGERY

## 2023-06-07 PROCEDURE — 1160F RVW MEDS BY RX/DR IN RCRD: CPT | Mod: CPTII,S$GLB,, | Performed by: SURGERY

## 2023-06-07 PROCEDURE — 1160F PR REVIEW ALL MEDS BY PRESCRIBER/CLIN PHARMACIST DOCUMENTED: ICD-10-PCS | Mod: CPTII,S$GLB,, | Performed by: SURGERY

## 2023-06-07 PROCEDURE — 99213 OFFICE O/P EST LOW 20 MIN: CPT | Mod: S$GLB,,, | Performed by: SURGERY

## 2023-06-07 PROCEDURE — 99213 PR OFFICE/OUTPT VISIT, EST, LEVL III, 20-29 MIN: ICD-10-PCS | Mod: S$GLB,,, | Performed by: SURGERY

## 2023-06-07 PROCEDURE — 1159F MED LIST DOCD IN RCRD: CPT | Mod: CPTII,S$GLB,, | Performed by: SURGERY

## 2023-06-07 NOTE — PROGRESS NOTES
History & Physical    SUBJECTIVE:     History of Present Illness:    57-year-old female with personal history of lung cancer referred for port removal.  Completion of chemotherapy.  Patient desires removal left subclavian venous access MediPort    Chief Complaint   Patient presents with    Other     Port removal          Review of patient's allergies indicates:  Review of patient's allergies indicates:   Allergen Reactions    Pyridium [phenazopyridine] Hives       Current Outpatient Medications on File Prior to Visit   Medication Sig Dispense Refill    albuterol (PROVENTIL/VENTOLIN HFA) 90 mcg/actuation inhaler INHALE 2 PUFFS INTO THE LUNGS EVERY 6 HOURS AS NEEDED FOR WHEEZING, RESCUE 6.7 g 6    albuterol-ipratropium (DUO-NEB) 2.5 mg-0.5 mg/3 mL nebulizer solution Take 3 mLs by nebulization every 6 (six) hours as needed for Wheezing. Rescue 75 mL 0    amitriptyline (ELAVIL) 25 MG tablet Take 1 tablet (25 mg total) by mouth nightly as needed for Pain. 30 tablet 3    cetirizine (ZYRTEC) 10 MG tablet Take 1 tablet (10 mg total) by mouth once daily. 30 tablet 3    famotidine (PEPCID) 20 MG tablet Take 1 tablet (20 mg total) by mouth 2 (two) times daily. 60 tablet 11    fluticasone propionate (FLONASE) 50 mcg/actuation nasal spray 1 spray (50 mcg total) by Each Nostril route once daily. 16 g 0    fluticasone-umeclidin-vilanter (TRELEGY ELLIPTA) 200-62.5-25 mcg inhaler Inhale 1 puff into the lungs once daily. 1 each 6    gabapentin (NEURONTIN) 400 MG capsule Take 1 capsule (400 mg total) by mouth 3 (three) times daily. 90 capsule 11    LIDOCAINE VISCOUS 2 % solution       ondansetron (ZOFRAN-ODT) 8 MG TbDL Take 1 tablet (8 mg total) by mouth every 6 (six) hours as needed. 30 tablet 3    pantoprazole (PROTONIX) 40 MG tablet Take 1 tablet (40 mg total) by mouth once daily. 30 tablet 3    promethazine (PHENERGAN) 25 MG tablet TAKE 1 TABLET(25 MG) BY MOUTH EVERY 8 HOURS Strength: 25 mg 30 tablet 3    sertraline (ZOLOFT) 50 MG  tablet Take 1 tablet (50 mg total) by mouth once daily. 30 tablet 2     No current facility-administered medications on file prior to visit.       Past Medical History:   Diagnosis Date    Allergy     Esophagitis     Fatty liver 08/29/2022    Diagnosed by Ultrasound which was order by Dr. Castillo.    GERD (gastroesophageal reflux disease)     Hiatal hernia     Schatzki's ring of distal esophagus     Small cell lung cancer 11/15/2021     Past Surgical History:   Procedure Laterality Date    COLONOSCOPY       in [place] and he did the dialation     COLONOSCOPY  08/17/2022    ESOPHAGEAL DILATION      ESOPHAGOGASTRODUODENOSCOPY  08/10/2022    SPINAL FUSION      neck     Family History   Problem Relation Age of Onset    Emphysema Mother     Diabetes Mother     Diabetes Father     Alcohol abuse Father     No Known Problems Sister     No Known Problems Brother     No Known Problems Maternal Aunt     No Known Problems Maternal Uncle     No Known Problems Paternal Aunt     No Known Problems Paternal Uncle     No Known Problems Maternal Grandmother     No Known Problems Maternal Grandfather     No Known Problems Paternal Grandmother     No Known Problems Paternal Grandfather     No Known Problems Daughter        Social History     Socioeconomic History    Marital status:    Tobacco Use    Smoking status: Every Day     Packs/day: 0.50     Years: 40.00     Pack years: 20.00     Types: Cigarettes    Smokeless tobacco: Never   Substance and Sexual Activity    Alcohol use: Yes     Alcohol/week: 10.0 standard drinks     Types: 10 Cans of beer per week    Drug use: Never    Sexual activity: Yes     Partners: Male     Birth control/protection: None          Review of Systems   Constitutional: Negative.    Respiratory: Negative.     Cardiovascular: Negative.    Gastrointestinal: Negative.    Musculoskeletal: Negative.    Neurological: Negative.    Endo/Heme/Allergies: Negative.    Psychiatric/Behavioral: Negative.        OBJECTIVE:     There were no vitals filed for this visit.              Physical Exam:  Physical Exam  Constitutional:       Appearance: Normal appearance. She is normal weight.   Eyes:      Pupils: Pupils are equal, round, and reactive to light.   Cardiovascular:      Rate and Rhythm: Normal rate and regular rhythm.   Pulmonary:      Effort: Pulmonary effort is normal.      Breath sounds: Normal breath sounds.   Chest:          Comments: Venous access MediPort palpable left anterior chest wall with catheter insertion left infraclavicular subclavian vein  Abdominal:      General: Abdomen is flat. Bowel sounds are normal.      Palpations: Abdomen is soft.   Musculoskeletal:         General: Normal range of motion.      Cervical back: Normal range of motion.   Skin:     General: Skin is warm and dry.   Neurological:      General: No focal deficit present.      Mental Status: She is alert and oriented to person, place, and time.      Cranial Nerves: No cranial nerve deficit.   Psychiatric:         Mood and Affect: Mood normal.         Behavior: Behavior normal.           ASSESSMENT/PLAN:   Personal history lung cancer, completion of therapy  PLAN:  Plan removal left subclavian venous access MediPort which can be done under either local sedation or a light general, patient choice.

## 2023-06-19 LAB
ANION GAP SERPL CALC-SCNC: 12 MMOL/L (ref 3–11)
BANDS: 2 % (ref 0–5)
BUN SERPL-MCNC: 3 MG/DL (ref 7–18)
BUN/CREAT SERPL: 4.91 RATIO (ref 7–18)
CALCIUM SERPL-MCNC: 9.5 MG/DL (ref 8.8–10.5)
CELLS COUNTED: 100
CHLORIDE SERPL-SCNC: 92 MMOL/L (ref 100–108)
CO2 SERPL-SCNC: 26 MMOL/L (ref 21–32)
CREAT SERPL-MCNC: 0.61 MG/DL (ref 0.55–1.02)
EOSINOPHIL NFR BLD: 1 % (ref 1–3)
ERYTHROCYTE [DISTWIDTH] IN BLOOD BY AUTOMATED COUNT: 13 % (ref 12.5–18)
GFR ESTIMATION: > 60
GLUCOSE SERPL-MCNC: 93 MG/DL (ref 70–110)
HCT VFR BLD AUTO: 46.6 % (ref 37–47)
HGB BLD-MCNC: 17.5 G/DL (ref 12–16)
LYMPHOCYTES NFR BLD: 11 % (ref 25–40)
MCH RBC QN AUTO: 35.3 PG (ref 27–31.2)
MCHC RBC AUTO-ENTMCNC: 37.6 G/DL (ref 31.8–35.4)
MCV RBC AUTO: 94 FL (ref 80–97)
MONOCYTES NFR BLD: 16 % (ref 1–15)
NEUTROPHILS # BLD AUTO: 6.6 10*3/UL (ref 1.8–7.7)
NEUTROPHILS NFR BLD: 70 % (ref 37–80)
NUCLEATED RED BLOOD CELLS: 0 %
PLATELETS: 225 10*3/UL (ref 142–424)
POTASSIUM SERPL-SCNC: 3.1 MMOL/L (ref 3.6–5.2)
RBC # BLD AUTO: 4.96 10*6/UL (ref 4.2–5.4)
RBC MORPH BLD: ABNORMAL
SMALL PLATELETS BLD QL SMEAR: ADEQUATE
SODIUM BLD-SCNC: 130 MMOL/L (ref 135–145)
WBC # BLD: 9.1 10*3/UL (ref 4.6–10.2)

## 2023-06-20 ENCOUNTER — OUTSIDE PLACE OF SERVICE (OUTPATIENT)
Dept: SURGERY | Facility: CLINIC | Age: 58
End: 2023-06-20

## 2023-06-20 PROCEDURE — 36590 PR REMOVAL TUNNELED CV CATH W SUBQ PORT OR PUMP: ICD-10-PCS | Mod: LT,,, | Performed by: SURGERY

## 2023-06-20 PROCEDURE — 36590 REMOVAL TUNNELED CV CATH: CPT | Mod: LT,,, | Performed by: SURGERY

## 2023-06-26 ENCOUNTER — OFFICE VISIT (OUTPATIENT)
Dept: SURGERY | Facility: CLINIC | Age: 58
End: 2023-06-26
Payer: MEDICAID

## 2023-06-26 DIAGNOSIS — Z98.890 POST-OPERATIVE STATE: Primary | ICD-10-CM

## 2023-06-26 PROCEDURE — 1160F RVW MEDS BY RX/DR IN RCRD: CPT | Mod: CPTII,S$GLB,, | Performed by: SURGERY

## 2023-06-26 PROCEDURE — 1159F MED LIST DOCD IN RCRD: CPT | Mod: CPTII,S$GLB,, | Performed by: SURGERY

## 2023-06-26 PROCEDURE — 1159F PR MEDICATION LIST DOCUMENTED IN MEDICAL RECORD: ICD-10-PCS | Mod: CPTII,S$GLB,, | Performed by: SURGERY

## 2023-06-26 PROCEDURE — 99024 PR POST-OP FOLLOW-UP VISIT: ICD-10-PCS | Mod: S$GLB,,, | Performed by: SURGERY

## 2023-06-26 PROCEDURE — 99024 POSTOP FOLLOW-UP VISIT: CPT | Mod: S$GLB,,, | Performed by: SURGERY

## 2023-06-26 PROCEDURE — 1160F PR REVIEW ALL MEDS BY PRESCRIBER/CLIN PHARMACIST DOCUMENTED: ICD-10-PCS | Mod: CPTII,S$GLB,, | Performed by: SURGERY

## 2023-06-26 NOTE — PROGRESS NOTES
HPI:  Status post left subclavian venous access port removal.  Here for suture removal    PHYSICAL EXAM:  Incision clean and dry with no redness or drainage noted.  Subcuticular suture removed  ASSESSMENT:    Stable  PLAN:      Revisit as needed

## 2023-06-29 ENCOUNTER — TELEPHONE (OUTPATIENT)
Dept: PRIMARY CARE CLINIC | Facility: CLINIC | Age: 58
End: 2023-06-29
Payer: MEDICAID

## 2023-06-29 NOTE — TELEPHONE ENCOUNTER
PER PT'S INS PT HAS REACHED  DAY OUT  FOR HER PANTOPRAZOLE --PT INFORMED CAN STILL GET MED BUT WILL NEED TO PAY OUT OF POCKET $21.80  --PHARMACY INFORMED PT STATED SHE WILL PAY OUT OF POCKET

## 2023-06-30 ENCOUNTER — OFFICE VISIT (OUTPATIENT)
Dept: HEMATOLOGY/ONCOLOGY | Facility: CLINIC | Age: 58
End: 2023-06-30
Payer: MEDICAID

## 2023-06-30 VITALS
BODY MASS INDEX: 19.42 KG/M2 | RESPIRATION RATE: 18 BRPM | HEIGHT: 66 IN | HEART RATE: 94 BPM | SYSTOLIC BLOOD PRESSURE: 157 MMHG | DIASTOLIC BLOOD PRESSURE: 84 MMHG

## 2023-06-30 DIAGNOSIS — R11.0 CHRONIC NAUSEA: Primary | ICD-10-CM

## 2023-06-30 DIAGNOSIS — C34.90 SMALL CELL LUNG CANCER: ICD-10-CM

## 2023-06-30 DIAGNOSIS — F32.A DEPRESSION, UNSPECIFIED DEPRESSION TYPE: ICD-10-CM

## 2023-06-30 PROCEDURE — 3077F PR MOST RECENT SYSTOLIC BLOOD PRESSURE >= 140 MM HG: ICD-10-PCS | Mod: CPTII,S$GLB,, | Performed by: NURSE PRACTITIONER

## 2023-06-30 PROCEDURE — 3008F BODY MASS INDEX DOCD: CPT | Mod: CPTII,S$GLB,, | Performed by: NURSE PRACTITIONER

## 2023-06-30 PROCEDURE — 3077F SYST BP >= 140 MM HG: CPT | Mod: CPTII,S$GLB,, | Performed by: NURSE PRACTITIONER

## 2023-06-30 PROCEDURE — 3008F PR BODY MASS INDEX (BMI) DOCUMENTED: ICD-10-PCS | Mod: CPTII,S$GLB,, | Performed by: NURSE PRACTITIONER

## 2023-06-30 PROCEDURE — 1160F RVW MEDS BY RX/DR IN RCRD: CPT | Mod: CPTII,S$GLB,, | Performed by: NURSE PRACTITIONER

## 2023-06-30 PROCEDURE — 99214 PR OFFICE/OUTPT VISIT, EST, LEVL IV, 30-39 MIN: ICD-10-PCS | Mod: S$GLB,,, | Performed by: NURSE PRACTITIONER

## 2023-06-30 PROCEDURE — 1159F MED LIST DOCD IN RCRD: CPT | Mod: CPTII,S$GLB,, | Performed by: NURSE PRACTITIONER

## 2023-06-30 PROCEDURE — 1160F PR REVIEW ALL MEDS BY PRESCRIBER/CLIN PHARMACIST DOCUMENTED: ICD-10-PCS | Mod: CPTII,S$GLB,, | Performed by: NURSE PRACTITIONER

## 2023-06-30 PROCEDURE — 1159F PR MEDICATION LIST DOCUMENTED IN MEDICAL RECORD: ICD-10-PCS | Mod: CPTII,S$GLB,, | Performed by: NURSE PRACTITIONER

## 2023-06-30 PROCEDURE — 99214 OFFICE O/P EST MOD 30 MIN: CPT | Mod: S$GLB,,, | Performed by: NURSE PRACTITIONER

## 2023-06-30 PROCEDURE — 3079F DIAST BP 80-89 MM HG: CPT | Mod: CPTII,S$GLB,, | Performed by: NURSE PRACTITIONER

## 2023-06-30 PROCEDURE — 3079F PR MOST RECENT DIASTOLIC BLOOD PRESSURE 80-89 MM HG: ICD-10-PCS | Mod: CPTII,S$GLB,, | Performed by: NURSE PRACTITIONER

## 2023-06-30 RX ORDER — HYDROCODONE BITARTRATE AND ACETAMINOPHEN 5; 325 MG/1; MG/1
TABLET ORAL
COMMUNITY
Start: 2023-06-20

## 2023-06-30 RX ORDER — METOCLOPRAMIDE 5 MG/1
5 TABLET ORAL
Qty: 120 TABLET | Refills: 1 | Status: SHIPPED | OUTPATIENT
Start: 2023-06-30 | End: 2024-03-08

## 2023-06-30 RX ORDER — PROCHLORPERAZINE MALEATE 10 MG
10 TABLET ORAL 3 TIMES DAILY
Qty: 90 TABLET | Refills: 11 | Status: SHIPPED | OUTPATIENT
Start: 2023-06-30 | End: 2024-06-29

## 2023-06-30 RX ORDER — SERTRALINE HYDROCHLORIDE 50 MG/1
50 TABLET, FILM COATED ORAL DAILY
Qty: 30 TABLET | Refills: 2 | Status: SHIPPED | OUTPATIENT
Start: 2023-06-30 | End: 2023-10-02

## 2023-06-30 NOTE — PROGRESS NOTES
MEDICAL ONCOLOGY FOLLOW UP CONSULTATION NOTE    Chief Complaint: Small Cell Lung Cancer    HPI: Patient is a 56 year old female current smoker who underwent screening CT scan chest by her PCP which showed a suspicious mass arising from the left lower lobe with bronchial obstruction highly suspicious for malignancy. She presents to our clinic today for further evaluation. Denies weight loss, activity and appetite changes. Does complain of cough which she attributes it to smoking and says it is chronic with no changes.     Ct Chest 2/10/22:  Left lower mass significantly decreased.  Measures 4.1 x 3.2 x 4.3 cm compared to pretreatment dimensions of 10 cm on CT scan September 27, 2021 and PET-CT on October 11, 2021.        CT scan w/o contrast: 9/27/2021    1.  Category 4x: Very suspicious lesion. Large soft tissue mass effect in   the left lower lobe associated with bronchial obstruction highly suspicious    for malignancy.      Chest CT with or without contrast, CT/PET and/or tissue sampling depending   on the probability of malignancy and comorbidities. Consultation with   pulmonology is also recommended.         PET scan: 10/11/2021  == Large left lower lobe hypermetabolic pass consistent with malignancy.  It has a maximum            diameter on the CT of approximately 10 cm it shows intense radiotracer uptake with the SUV being 17.7      DIAGNOSIS:   11/12/2021 BOWERS/kml   LUNG, LEFT LOWER LOBE, EBUS-FNA:   -- SMALL CELL CARCINOMA.   -- SEE COMMENT.   Comment:   A panel of immunohistochemical stains are performed on block 1A to further   characterize the neoplasm.  The tumor cells are positive for pancytokeratin,   CK7, CK8/18, and CD56.  The cells of interest are negative for P40,   synaptophysin, TTF1, CD3, CD20, CK20, and S100.  The CK7 demonstrates some   dot-like pattern expression.  Controls performed as expected.  The above   findings support the diagnosis of small cell carcinoma.             Labs:  Lab  Results   Component Value Date    WBC 9.1 06/19/2023    HGB 17.5 (H) 06/19/2023    HCT 46.6 06/19/2023    MCV 94.0 06/19/2023    LABPLAT 225 06/19/2023      Platelets   Date Value Ref Range Status   06/19/2023 225 142 - 424 10*3/uL Final     CMP  Sodium   Date Value Ref Range Status   06/19/2023 130 (L) 135 - 145 mmol/L Final     Potassium   Date Value Ref Range Status   06/19/2023 3.1 (L) 3.6 - 5.2 mmol/L Final     Chloride   Date Value Ref Range Status   06/19/2023 92 (L) 100 - 108 mmol/L Final     CO2   Date Value Ref Range Status   06/19/2023 26 21 - 32 mmol/L Final     Glucose   Date Value Ref Range Status   06/19/2023 93 70 - 110 mg/dL Final     BUN   Date Value Ref Range Status   06/19/2023 3 (L) 7 - 18 mg/dL Final     Creatinine   Date Value Ref Range Status   06/19/2023 0.61 0.55 - 1.02 mg/dL Final     Calcium   Date Value Ref Range Status   06/19/2023 9.5 8.8 - 10.5 mg/dL Final     Total Protein   Date Value Ref Range Status   06/13/2022 7.6 6.4 - 8.2 g/dL Final     Albumin   Date Value Ref Range Status   06/13/2022 3.7 3.4 - 5.0 g/dL Final     Total Bilirubin   Date Value Ref Range Status   06/13/2022 0.5 0.0 - 1.0 mg/dL Final     Alkaline Phosphatase   Date Value Ref Range Status   06/13/2022 128 (H) 46 - 116 U/L Final     AST   Date Value Ref Range Status   06/13/2022 37 15 - 37 U/L Final     ALT   Date Value Ref Range Status   06/13/2022 39 12 - 78 U/L Final     Anion Gap   Date Value Ref Range Status   06/19/2023 12.0 (H) 3.0 - 11.0 mmol/L Final     eGFR if    Date Value Ref Range Status   09/17/2021 124 > OR = 60 mL/min/1.73m2 Final     eGFR if non    Date Value Ref Range Status   09/17/2021 107 > OR = 60 mL/min/1.73m2 Final            Past Medical History:   Diagnosis Date    Allergy     Esophagitis     Fatty liver 08/29/2022    Diagnosed by Ultrasound which was order by Dr. Castillo.    GERD (gastroesophageal reflux disease)     Hiatal hernia     Schatzki's ring of  distal esophagus     Small cell lung cancer 11/15/2021     Family History   Problem Relation Age of Onset    Emphysema Mother     Diabetes Mother     Diabetes Father     Alcohol abuse Father     No Known Problems Sister     No Known Problems Brother     No Known Problems Maternal Aunt     No Known Problems Maternal Uncle     No Known Problems Paternal Aunt     No Known Problems Paternal Uncle     No Known Problems Maternal Grandmother     No Known Problems Maternal Grandfather     No Known Problems Paternal Grandmother     No Known Problems Paternal Grandfather     No Known Problems Daughter      Social History     Socioeconomic History    Marital status:    Tobacco Use    Smoking status: Every Day     Packs/day: 0.50     Years: 40.00     Pack years: 20.00     Types: Cigarettes    Smokeless tobacco: Never   Substance and Sexual Activity    Alcohol use: Yes     Alcohol/week: 10.0 standard drinks     Types: 10 Cans of beer per week    Drug use: Never    Sexual activity: Yes     Partners: Male     Birth control/protection: None     Past Surgical History:   Procedure Laterality Date    COLONOSCOPY       in [place] and he did the dialation     COLONOSCOPY  08/17/2022    ESOPHAGEAL DILATION      ESOPHAGOGASTRODUODENOSCOPY  08/10/2022    SPINAL FUSION      neck         Review of systems:  Review of Systems   Constitutional:  Negative for activity change, appetite change, chills, diaphoresis, fatigue and unexpected weight change.   HENT:  Negative for congestion, facial swelling, hearing loss, mouth sores, trouble swallowing and voice change.    Eyes:  Negative for photophobia, pain, discharge and itching.   Respiratory:  Negative for apnea, cough, choking, chest tightness, shortness of breath, wheezing and stridor.    Cardiovascular:  Negative for chest pain, palpitations and leg swelling.   Gastrointestinal:  Positive for nausea. Negative for abdominal distention, abdominal pain, anal bleeding and blood in  stool.   Endocrine: Negative for cold intolerance, heat intolerance, polydipsia and polyphagia.   Genitourinary:  Negative for difficulty urinating, dysuria, flank pain and hematuria.   Musculoskeletal:  Negative for arthralgias, back pain, joint swelling, myalgias, neck pain and neck stiffness.        +ve for joint pain   Skin:  Positive for rash. Negative for color change, pallor and wound.   Allergic/Immunologic: Negative for environmental allergies, food allergies and immunocompromised state.   Neurological:  Positive for headaches. Negative for dizziness, seizures, facial asymmetry, speech difficulty, light-headedness and numbness.   Hematological:  Negative for adenopathy. Does not bruise/bleed easily.   Psychiatric/Behavioral:  Negative for agitation, behavioral problems, confusion, decreased concentration and sleep disturbance.    All other systems reviewed and are negative.    Physical Exam  Vitals and nursing note reviewed.   Constitutional:       General: She is not in acute distress.     Appearance: Normal appearance. She is not ill-appearing.   HENT:      Head: Normocephalic and atraumatic.      Nose: No congestion or rhinorrhea.   Eyes:      General: No scleral icterus.     Extraocular Movements: Extraocular movements intact.      Pupils: Pupils are equal, round, and reactive to light.   Cardiovascular:      Rate and Rhythm: Normal rate and regular rhythm.      Pulses: Normal pulses.      Heart sounds: Normal heart sounds. No murmur heard.    No gallop.   Pulmonary:      Effort: Pulmonary effort is normal. No respiratory distress.      Breath sounds: Normal breath sounds. No stridor. No wheezing or rhonchi.      Comments: +ve for coarse breath sounds  Abdominal:      General: Bowel sounds are normal. There is no distension.      Palpations: There is no mass.      Tenderness: There is no abdominal tenderness. There is no guarding.   Musculoskeletal:         General: No swelling, tenderness, deformity or  signs of injury. Normal range of motion.      Cervical back: Normal range of motion and neck supple. No rigidity. No muscular tenderness.      Right lower leg: No edema.      Left lower leg: No edema.   Skin:     General: Skin is warm and dry.      Coloration: Skin is not jaundiced or pale.      Findings: Rash present. No bruising or lesion.   Neurological:      General: No focal deficit present.      Mental Status: She is alert and oriented to person, place, and time.      Cranial Nerves: No cranial nerve deficit.      Sensory: No sensory deficit.      Motor: No weakness.      Gait: Gait normal.   Psychiatric:         Mood and Affect: Mood normal.         Behavior: Behavior normal.         Thought Content: Thought content normal.     Vitals:    06/30/23 0953   BP: (!) 157/84   Pulse: 94   Resp: 18   Body surface area is 1.59 meters squared.  Imaging:                MRI Brain 7/5/22:        Assessment/Plan:      ECOG 1    1. Limited Stage Small cell carcinoma: T4N0M0, Stage IIIA    == Diagnosis via Left lower lobe EBUS: FNA.  == I do not feel resectability would be an option which is the preferred option per NCCN guidelines., looking at the size and location. No evidence of metastatic disease on imaging. However, on the last chest X-ray  she was noted to have suspected left sided effusion, this did not correlate with PET scan but obviously concerning.  She would like to seek a second opinion at [facility] and  I discussed this with her that while she does this, she should not delay start of her treatment considering the size of the mass. Our plan should be for concurrent chemoradiation.  12/21- 4/22 : Carbo/Etoposide + XRT completed,   04/05/2022: PET/CT showed partial response to treatment.   5/27/2022 completed prophylactic whole brain radiation  7/22/22: review of recent CT C/A/P shows stable disease with a 3 cm LLL mass noted with no significant change as compared to 2/2022,  10/28/22: review of recent PET  shows stable disease with decrease size and SUV of previous lung lesion. She reports weight loss, chronic nausea, fatigue, cough and SOB. Will reach out to pcp r/g weight loss and nausea, will add phenergan as well. Advised to f/u with pulm for chronic cough and SOB, will send out medrol dose pack.       To Do:   ==RTC in 2 months, CT chest         Total time spent in counseling and discussion about further management options including relevant lab work, treatment,  prognosis, medications and intended side effects was more than 35 minutes. More than 50% of the time was spent on counseling and coordination of care.  I spent a total of 35 minutes on the day of the visit.This includes face to face time and non-face to face time preparing to see the patient (eg, review of tests), Obtaining and/or reviewing separately obtained history, Documenting clinical information in the electronic or other health record, Independently interpreting resultsand communicating results to the patient/family/caregiver, or Care coordination.

## 2023-06-30 NOTE — PROGRESS NOTES
MEDICAL ONCOLOGY FOLLOW UP CONSULTATION NOTE    Chief Complaint: Small Cell Lung Cancer    HPI: Patient is a 56 year old female current smoker who underwent screening CT scan chest by her PCP which showed a suspicious mass arising from the left lower lobe with bronchial obstruction highly suspicious for malignancy. She presents to our clinic today for further evaluation. Denies weight loss, activity and appetite changes. Does complain of cough which she attributes it to smoking and says it is chronic with no changes.     Imaging:      Ct Chest 2/10/22:  Left lower mass significantly decreased.  Measures 4.1 x 3.2 x 4.3 cm compared to pretreatment dimensions of 10 cm on CT scan September 27, 2021 and PET-CT on October 11, 2021.        CT scan w/o contrast: 9/27/2021    1.  Category 4x: Very suspicious lesion. Large soft tissue mass effect in   the left lower lobe associated with bronchial obstruction highly suspicious    for malignancy.      Chest CT with or without contrast, CT/PET and/or tissue sampling depending   on the probability of malignancy and comorbidities. Consultation with   pulmonology is also recommended.         PET scan: 10/11/2021  == Large left lower lobe hypermetabolic pass consistent with malignancy.  It has a maximum            diameter on the CT of approximately 10 cm it shows intense radiotracer uptake with the SUV being 17.7      DIAGNOSIS:   11/12/2021 BOWERS/kml   LUNG, LEFT LOWER LOBE, EBUS-FNA:   -- SMALL CELL CARCINOMA.   -- SEE COMMENT.   Comment:   A panel of immunohistochemical stains are performed on block 1A to further   characterize the neoplasm.  The tumor cells are positive for pancytokeratin,   CK7, CK8/18, and CD56.  The cells of interest are negative for P40,   synaptophysin, TTF1, CD3, CD20, CK20, and S100.  The CK7 demonstrates some   dot-like pattern expression.  Controls performed as expected.  The above   findings support the diagnosis of small cell carcinoma.              Labs:  Lab Results   Component Value Date    WBC 9.1 06/19/2023    HGB 17.5 (H) 06/19/2023    HCT 46.6 06/19/2023    MCV 94.0 06/19/2023    LABPLAT 225 06/19/2023      Platelets   Date Value Ref Range Status   06/19/2023 225 142 - 424 10*3/uL Final     CMP  Sodium   Date Value Ref Range Status   06/19/2023 130 (L) 135 - 145 mmol/L Final     Potassium   Date Value Ref Range Status   06/19/2023 3.1 (L) 3.6 - 5.2 mmol/L Final     Chloride   Date Value Ref Range Status   06/19/2023 92 (L) 100 - 108 mmol/L Final     CO2   Date Value Ref Range Status   06/19/2023 26 21 - 32 mmol/L Final     Glucose   Date Value Ref Range Status   06/19/2023 93 70 - 110 mg/dL Final     BUN   Date Value Ref Range Status   06/19/2023 3 (L) 7 - 18 mg/dL Final     Creatinine   Date Value Ref Range Status   06/19/2023 0.61 0.55 - 1.02 mg/dL Final     Calcium   Date Value Ref Range Status   06/19/2023 9.5 8.8 - 10.5 mg/dL Final     Total Protein   Date Value Ref Range Status   06/13/2022 7.6 6.4 - 8.2 g/dL Final     Albumin   Date Value Ref Range Status   06/13/2022 3.7 3.4 - 5.0 g/dL Final     Total Bilirubin   Date Value Ref Range Status   06/13/2022 0.5 0.0 - 1.0 mg/dL Final     Alkaline Phosphatase   Date Value Ref Range Status   06/13/2022 128 (H) 46 - 116 U/L Final     AST   Date Value Ref Range Status   06/13/2022 37 15 - 37 U/L Final     ALT   Date Value Ref Range Status   06/13/2022 39 12 - 78 U/L Final     Anion Gap   Date Value Ref Range Status   06/19/2023 12.0 (H) 3.0 - 11.0 mmol/L Final     eGFR if    Date Value Ref Range Status   09/17/2021 124 > OR = 60 mL/min/1.73m2 Final     eGFR if non    Date Value Ref Range Status   09/17/2021 107 > OR = 60 mL/min/1.73m2 Final            Past Medical History:   Diagnosis Date    Allergy     Esophagitis     Fatty liver 08/29/2022    Diagnosed by Ultrasound which was order by Dr. Castillo.    GERD (gastroesophageal reflux disease)     Hiatal hernia      Schatzki's ring of distal esophagus     Small cell lung cancer 11/15/2021     Family History   Problem Relation Age of Onset    Emphysema Mother     Diabetes Mother     Diabetes Father     Alcohol abuse Father     No Known Problems Sister     No Known Problems Brother     No Known Problems Maternal Aunt     No Known Problems Maternal Uncle     No Known Problems Paternal Aunt     No Known Problems Paternal Uncle     No Known Problems Maternal Grandmother     No Known Problems Maternal Grandfather     No Known Problems Paternal Grandmother     No Known Problems Paternal Grandfather     No Known Problems Daughter      Social History     Socioeconomic History    Marital status:    Tobacco Use    Smoking status: Every Day     Packs/day: 0.50     Years: 40.00     Pack years: 20.00     Types: Cigarettes    Smokeless tobacco: Never   Substance and Sexual Activity    Alcohol use: Yes     Alcohol/week: 10.0 standard drinks     Types: 10 Cans of beer per week    Drug use: Never    Sexual activity: Yes     Partners: Male     Birth control/protection: None     Past Surgical History:   Procedure Laterality Date    COLONOSCOPY       in [place] and he did the dialation     COLONOSCOPY  08/17/2022    ESOPHAGEAL DILATION      ESOPHAGOGASTRODUODENOSCOPY  08/10/2022    SPINAL FUSION      neck         Review of systems:  Review of Systems   Constitutional:  Negative for activity change, appetite change, chills, diaphoresis, fatigue and unexpected weight change.   HENT:  Negative for congestion, facial swelling, hearing loss, mouth sores, trouble swallowing and voice change.    Eyes:  Negative for photophobia, pain, discharge and itching.   Respiratory:  Negative for apnea, cough, choking, chest tightness, shortness of breath, wheezing and stridor.    Cardiovascular:  Negative for chest pain, palpitations and leg swelling.   Gastrointestinal:  Positive for nausea. Negative for abdominal distention, abdominal pain, anal  bleeding and blood in stool.   Endocrine: Negative for cold intolerance, heat intolerance, polydipsia and polyphagia.   Genitourinary:  Negative for difficulty urinating, dysuria, flank pain and hematuria.   Musculoskeletal:  Negative for arthralgias, back pain, joint swelling, myalgias, neck pain and neck stiffness.        +ve for joint pain   Skin:  Positive for rash. Negative for color change, pallor and wound.   Allergic/Immunologic: Negative for environmental allergies, food allergies and immunocompromised state.   Neurological:  Positive for headaches. Negative for dizziness, seizures, facial asymmetry, speech difficulty, light-headedness and numbness.   Hematological:  Negative for adenopathy. Does not bruise/bleed easily.   Psychiatric/Behavioral:  Negative for agitation, behavioral problems, confusion, decreased concentration and sleep disturbance.    All other systems reviewed and are negative.    Physical Exam  Vitals and nursing note reviewed.   Constitutional:       General: She is not in acute distress.     Appearance: Normal appearance. She is not ill-appearing.   HENT:      Head: Normocephalic and atraumatic.      Nose: No congestion or rhinorrhea.   Eyes:      General: No scleral icterus.     Extraocular Movements: Extraocular movements intact.      Pupils: Pupils are equal, round, and reactive to light.   Cardiovascular:      Rate and Rhythm: Normal rate and regular rhythm.      Pulses: Normal pulses.      Heart sounds: Normal heart sounds. No murmur heard.    No gallop.   Pulmonary:      Effort: Pulmonary effort is normal. No respiratory distress.      Breath sounds: Normal breath sounds. No stridor. No wheezing or rhonchi.      Comments: +ve for coarse breath sounds  Abdominal:      General: Bowel sounds are normal. There is no distension.      Palpations: There is no mass.      Tenderness: There is no abdominal tenderness. There is no guarding.   Musculoskeletal:         General: No swelling,  tenderness, deformity or signs of injury. Normal range of motion.      Cervical back: Normal range of motion and neck supple. No rigidity. No muscular tenderness.      Right lower leg: No edema.      Left lower leg: No edema.   Skin:     General: Skin is warm and dry.      Coloration: Skin is not jaundiced or pale.      Findings: Rash present. No bruising or lesion.   Neurological:      General: No focal deficit present.      Mental Status: She is alert and oriented to person, place, and time.      Cranial Nerves: No cranial nerve deficit.      Sensory: No sensory deficit.      Motor: No weakness.      Gait: Gait normal.   Psychiatric:         Mood and Affect: Mood normal.         Behavior: Behavior normal.         Thought Content: Thought content normal.     Vitals:    06/30/23 0953   BP: (!) 157/84   Pulse: 94   Resp: 18   Body surface area is 1.59 meters squared.  Imaging:                MRI Brain 7/5/22:        Assessment/Plan:      ECOG 1    1. Limited Stage Small cell carcinoma: T4N0M0, Stage IIIA    == Diagnosis via Left lower lobe EBUS: FNA.  == I do not feel resectability would be an option which is the preferred option per NCCN guidelines., looking at the size and location. No evidence of metastatic disease on imaging. However, on the last chest X-ray  she was noted to have suspected left sided effusion, this did not correlate with PET scan but obviously concerning.  She would like to seek a second opinion at [facility] and  I discussed this with her that while she does this, she should not delay start of her treatment considering the size of the mass. Our plan should be for concurrent chemoradiation.  12/21- 4/22 : Carbo/Etoposide + XRT completed,   04/05/2022: PET/CT showed partial response to treatment.   5/27/2022 completed prophylactic whole brain radiation  7/22/22: review of recent CT C/A/P shows stable disease with a 3 cm LLL mass noted with no significant change as compared to 2/2022,  10/28/22:  review of recent PET shows stable disease with decrease size and SUV of previous lung lesion. She reports weight loss, chronic nausea, fatigue, cough and SOB. Will reach out to pcp r/g weight loss and nausea, will add phenergan as well. Advised to f/u with pulm for chronic cough and SOB, will send out medrol dose pack.   6/1/23: doing ok, some weight loss due to chronic nausea, depression and fatigue. Requesting to try anit-depression medication, will send out zoloft and refer to nutritional services. Ambu ref to surgery for port removal   6/30/23: continues with chronic nausea vomitting will add reglan QID and compazine TID, tolerating zoloft well, will repeat PET     To Do:   ==RTC in 1 month in [place] for med check         Total time spent in counseling and discussion about further management options including relevant lab work, treatment,  prognosis, medications and intended side effects was more than 45 minutes. More than 50% of the time was spent on counseling and coordination of care.  I spent a total of 45 minutes on the day of the visit.This includes face to face time and non-face to face time preparing to see the patient (eg, review of tests), Obtaining and/or reviewing separately obtained history, Documenting clinical information in the electronic or other health record, Independently interpreting resultsand communicating results to the patient/family/caregiver, or Care coordination.

## 2023-07-06 ENCOUNTER — TELEPHONE (OUTPATIENT)
Dept: PAIN MEDICINE | Facility: CLINIC | Age: 58
End: 2023-07-06

## 2023-07-06 NOTE — TELEPHONE ENCOUNTER
----- Message from Ophelia Bowen sent at 7/6/2023  3:27 PM CDT -----  Regarding: post surgery  Contact: PT  PT is calling to find out if she needs to come in to get her stiches removed or will they dissolve on their own, return call [phone]

## 2023-07-17 ENCOUNTER — TELEPHONE (OUTPATIENT)
Dept: HEMATOLOGY/ONCOLOGY | Facility: CLINIC | Age: 58
End: 2023-07-17
Payer: MEDICAID

## 2023-08-09 ENCOUNTER — OFFICE VISIT (OUTPATIENT)
Dept: PULMONOLOGY | Facility: CLINIC | Age: 58
End: 2023-08-09
Payer: MEDICAID

## 2023-08-09 VITALS
HEIGHT: 66 IN | DIASTOLIC BLOOD PRESSURE: 90 MMHG | SYSTOLIC BLOOD PRESSURE: 155 MMHG | BODY MASS INDEX: 18.8 KG/M2 | WEIGHT: 117 LBS | OXYGEN SATURATION: 93 % | HEART RATE: 104 BPM | RESPIRATION RATE: 17 BRPM

## 2023-08-09 DIAGNOSIS — J44.9 CHRONIC OBSTRUCTIVE PULMONARY DISEASE, UNSPECIFIED COPD TYPE: Primary | ICD-10-CM

## 2023-08-09 DIAGNOSIS — Z72.0 TOBACCO USE: ICD-10-CM

## 2023-08-09 DIAGNOSIS — J44.9 CHRONIC OBSTRUCTIVE PULMONARY DISEASE, UNSPECIFIED COPD TYPE: ICD-10-CM

## 2023-08-09 DIAGNOSIS — C34.90 SMALL CELL LUNG CANCER: ICD-10-CM

## 2023-08-09 PROCEDURE — 3008F PR BODY MASS INDEX (BMI) DOCUMENTED: ICD-10-PCS | Mod: CPTII,S$GLB,,

## 2023-08-09 PROCEDURE — 3008F BODY MASS INDEX DOCD: CPT | Mod: CPTII,S$GLB,,

## 2023-08-09 PROCEDURE — 99214 PR OFFICE/OUTPT VISIT, EST, LEVL IV, 30-39 MIN: ICD-10-PCS | Mod: S$GLB,,,

## 2023-08-09 PROCEDURE — 1159F MED LIST DOCD IN RCRD: CPT | Mod: CPTII,S$GLB,,

## 2023-08-09 PROCEDURE — 3077F PR MOST RECENT SYSTOLIC BLOOD PRESSURE >= 140 MM HG: ICD-10-PCS | Mod: CPTII,S$GLB,,

## 2023-08-09 PROCEDURE — 3077F SYST BP >= 140 MM HG: CPT | Mod: CPTII,S$GLB,,

## 2023-08-09 PROCEDURE — 3080F PR MOST RECENT DIASTOLIC BLOOD PRESSURE >= 90 MM HG: ICD-10-PCS | Mod: CPTII,S$GLB,,

## 2023-08-09 PROCEDURE — 99214 OFFICE O/P EST MOD 30 MIN: CPT | Mod: S$GLB,,,

## 2023-08-09 PROCEDURE — 1159F PR MEDICATION LIST DOCUMENTED IN MEDICAL RECORD: ICD-10-PCS | Mod: CPTII,S$GLB,,

## 2023-08-09 PROCEDURE — 3080F DIAST BP >= 90 MM HG: CPT | Mod: CPTII,S$GLB,,

## 2023-08-09 RX ORDER — TIOTROPIUM BROMIDE INHALATION SPRAY 3.12 UG/1
2 SPRAY, METERED RESPIRATORY (INHALATION) DAILY
Qty: 4 G | Refills: 6 | Status: SHIPPED | OUTPATIENT
Start: 2023-08-09 | End: 2023-08-22

## 2023-08-09 RX ORDER — BUDESONIDE AND FORMOTEROL FUMARATE DIHYDRATE 160; 4.5 UG/1; UG/1
2 AEROSOL RESPIRATORY (INHALATION) EVERY 12 HOURS
Qty: 10.2 G | Refills: 6 | Status: SHIPPED | OUTPATIENT
Start: 2023-08-09 | End: 2024-03-04 | Stop reason: ALTCHOICE

## 2023-08-09 NOTE — ASSESSMENT & PLAN NOTE
Insurance is not covering Trelegy.  We will switch to Symbicort and Spiriva for maintenance therapy.  Symbicort will be 2 puffs twice a day and Spiriva will be 2 puffs once a day.  She will continue to use her rescue inhaler as needed for shortness of breath.    She does still have her nebulizer machine in the [employer] that she may use when she needs them.

## 2023-08-09 NOTE — PROGRESS NOTES
Subjective:    Patient Identification:  Patient ID: Diana Prather is a 57 y.o. female.    Referring Provider:  No ref. provider found     Chief Complaint:  No chief complaint on file.      History of Present Illness:    Diana Prather is a 57 y.o. female who presents for a COPD follow-up.  She also has small-cell lung cancer.  She was last seen by me in January of this year and she was taking Trelegy 200 for maintenance therapy and having to use her rescue inhaler twice a day.  At the time she did not have a nebulizer machine someone was ordered with [employer]  Her insurance will not cover Trelegy.  She did get her nebulizer machine with a [employer].  She says that she only takes them when she really needs them because she does not like the taste of the neb treatments.  She does still use her rescue inhaler when needed.  She is currently still smoking about a pack per day she says that this is an increase from her usual intake.  Her  was recently treated for cancer and she says that he had his bladder and prostate taken out and she is been under a lot of stress.  However she says that she is currently in remission and all of his scans have been looking good.  At this point she is not quite ready to make the metal commitment to stop smoking.  I told her to let us know and we can refer her to the smoking cessation program.    Review of Systems:  Review of Systems   Constitutional:  Negative for fever, chills, appetite change, night sweats and weakness.   HENT:  Negative for postnasal drip, rhinorrhea, sore throat, trouble swallowing, voice change, congestion and ear pain.    Respiratory:  Positive for cough, shortness of breath and use of rescue inhaler. Negative for apnea, hemoptysis, choking, chest tightness, previous hospitialization due to pulmonary problems, pleurisy, somnolence and Paroxysmal Nocturnal Dyspnea.    Cardiovascular:  Negative for chest pain, palpitations and leg swelling.    Genitourinary:  Negative for difficulty urinating and hematuria.   Endocrine:  Negative for polydipsia, polyphagia, cold intolerance, heat intolerance and polyuria.    Musculoskeletal:  Negative for joint swelling and myalgias.   Skin:  Negative for rash.   Gastrointestinal:  Negative for nausea, vomiting, abdominal pain and abdominal distention.   Neurological:  Negative for dizziness, syncope, light-headedness and headaches.   Psychiatric/Behavioral:  Negative for confusion and sleep disturbance. The patient is not nervous/anxious.        Allergies:   Review of patient's allergies indicates:   Allergen Reactions    Pyridium [phenazopyridine] Hives       Medications:      Past Medical History:      Past Medical History:   Diagnosis Date    Allergy     Esophagitis     Fatty liver 08/29/2022    Diagnosed by Ultrasound which was order by Dr. Castillo.    GERD (gastroesophageal reflux disease)     Hiatal hernia     Schatzki's ring of distal esophagus     Small cell lung cancer 11/15/2021       Family History:      Family History   Problem Relation Age of Onset    Emphysema Mother     Diabetes Mother     Diabetes Father     Alcohol abuse Father     No Known Problems Sister     No Known Problems Brother     No Known Problems Maternal Aunt     No Known Problems Maternal Uncle     No Known Problems Paternal Aunt     No Known Problems Paternal Uncle     No Known Problems Maternal Grandmother     No Known Problems Maternal Grandfather     No Known Problems Paternal Grandmother     No Known Problems Paternal Grandfather     No Known Problems Daughter         Social History:      Past Surgical History:   Procedure Laterality Date    COLONOSCOPY       in [place] and he did the dialation     COLONOSCOPY  08/17/2022    ESOPHAGEAL DILATION      ESOPHAGOGASTRODUODENOSCOPY  08/10/2022    SPINAL FUSION      neck       Physical Exam:  BP (!) 155/90 (BP Location: Left arm, Patient Position: Sitting, BP Method: Large  "(Automatic))   Pulse 104   Resp 17   Ht 5' 6" (1.676 m)   Wt 53.1 kg (117 lb)   LMP  (LMP Unknown)   SpO2 (!) 93%   BMI 18.88 kg/m²   Physical Exam   Constitutional: She is oriented to person, place, and time. She appears not cachectic. No distress.   HENT:   Head: Normocephalic.   Right Ear: External ear normal.   Left Ear: External ear normal.   Nose: Nose normal. No mucosal edema. No polyps.   Mouth/Throat: Oropharynx is clear and moist. Normal dentition. No oropharyngeal exudate. Mallampati Score: III.   Neck: No JVD present. No tracheal deviation present. No thyromegaly present.   Cardiovascular: Normal rate, regular rhythm, normal heart sounds and intact distal pulses. Exam reveals no gallop and no friction rub.   No murmur heard.  Pulmonary/Chest: Normal expansion, symmetric chest wall expansion, effort normal and breath sounds normal. No stridor. No respiratory distress. She has no decreased breath sounds. She has no wheezes. She has no rhonchi. She has no rales. Chest wall is not dull to percussion. She exhibits no tenderness. Negative for egophony. Negative for tactile fremitus.   Abdominal: Soft. Bowel sounds are normal. She exhibits no distension and no mass. There is no hepatosplenomegaly. There is no abdominal tenderness. There is no rebound and no guarding. No hernia.   Musculoskeletal:         General: No tenderness, deformity or edema. Normal range of motion.      Cervical back: Normal range of motion and neck supple.   Lymphadenopathy: No supraclavicular adenopathy is present.     She has no cervical adenopathy.     She has no axillary adenopathy.   Neurological: She is alert and oriented to person, place, and time. She has normal reflexes. She displays normal reflexes. No cranial nerve deficit. She exhibits normal muscle tone.   Skin: Skin is warm and dry. No rash noted. She is not diaphoretic. No cyanosis or erythema. No pallor. Nails show no clubbing.   Psychiatric: She has a normal mood " "and affect. Her behavior is normal. Judgment and thought content normal.           No results found for: "PREFEV1", "OEG3LDLGOC", "PREFVC", "FVCPREREF", "LSGVVG9MMP", "XEG1PTECHFE", "PYCV2MZD", "GWEM0IEB", "PREDLCO", "DLCOSBPRRF", "DLCOADJPRE", "DLCOCSBRPRRF", "POSTFEV1", "POSTFVC", "BAXGUMS7VQE"   X-Ray Chest PA And Lateral                                RADIOLOGY REPORT        PT NAME: PATRICIA BASHIR      [facility]     : 1965 F 57             [address]    ACCT: [id number]                                              [facility] Rec #: [id number]                                        [id number]    Patient Location: [place].PRETEST/             Procedure: CHEST 2 VIEWS    REQUISITION #: [id number]      REPORT #: [id number]           DATE OF EXAM: 23    TIME OF EXAM:        CHEST 2 VIEWS        CLINICAL INDICATION: Surgical clearance        COMPARISON: None        FINDINGS: Frontal and lateral views of the chest demonstrate some   atelectasis and fibrosis in the left lung base. Right lung is clear. Small   left pleural effusion versus pleural fibrosis. No pneumothorax. The   cardiomediastinal silhouette is within normal limits. Left-sided Mediport   terminates near the innominate vein confluence.        IMPRESSION:    1. Left lung base atelectasis and fibrosis with small left pleural effusion    versus pleural fibrosis.    2. Otherwise negative chest.        Signer Name: Jayce Cardenas MD    Signed: 2023 4:42 PM CDT    ()        DICTATING PHYSICIAN:  JAYCE CARDENAS MD                   Date Dictated: 23        Signed By:  JAYCE CARDENAS MD     Date Signed:  23     CC: KEYLA LITTLE MD ; KEYLA LITTLE MD      ADMITTING PHYSICIAN:                                                                                                    ORDERING PHY: KEYLA LITTLE MD                                                                               "                                                                        ATTENDING PHY: KEYLA LITTLE MD    Patient Status:  PRE SDC    Admit Service Date: 06/08/23                Accessory Clinical Data:  Chest x-ray:    CT scan:     PFTs:     6MWT:     TTE:    Lab data:    All radiographic imaging of the chest, PFT tracings/data, and 6MWT data have been independently reviewed and interpreted unless otherwise specified.     Assessment and Plan:      Problem List Items Addressed This Visit          Pulmonary    Chronic obstructive pulmonary disease - Primary    Current Assessment & Plan     Insurance is not covering Trelegy.  We will switch to Symbicort and Spiriva for maintenance therapy.  Symbicort will be 2 puffs twice a day and Spiriva will be 2 puffs once a day.  She will continue to use her rescue inhaler as needed for shortness of breath.    She does still have her nebulizer machine in the [employer] that she may use when she needs them.         Relevant Medications    budesonide-formoterol 160-4.5 mcg (SYMBICORT) 160-4.5 mcg/actuation HFAA    tiotropium bromide (SPIRIVA RESPIMAT) 2.5 mcg/actuation inhaler       Oncology    Small cell lung cancer       Other    Tobacco use    Current Assessment & Plan     She will let us know when she is ready for referral to smoking cessation program.           No orders of the defined types were placed in this encounter.           Follow up in about 6 months (around 2/9/2024).

## 2023-08-11 ENCOUNTER — OFFICE VISIT (OUTPATIENT)
Dept: HEMATOLOGY/ONCOLOGY | Facility: CLINIC | Age: 58
End: 2023-08-11
Payer: MEDICAID

## 2023-08-11 VITALS
BODY MASS INDEX: 18.08 KG/M2 | HEART RATE: 96 BPM | SYSTOLIC BLOOD PRESSURE: 143 MMHG | OXYGEN SATURATION: 97 % | DIASTOLIC BLOOD PRESSURE: 83 MMHG | WEIGHT: 112 LBS

## 2023-08-11 DIAGNOSIS — C34.92 SMALL CELL LUNG CANCER, LEFT: ICD-10-CM

## 2023-08-11 DIAGNOSIS — F32.A DEPRESSION, UNSPECIFIED DEPRESSION TYPE: Primary | ICD-10-CM

## 2023-08-11 PROCEDURE — 1159F MED LIST DOCD IN RCRD: CPT | Mod: CPTII,S$GLB,, | Performed by: NURSE PRACTITIONER

## 2023-08-11 PROCEDURE — 1160F PR REVIEW ALL MEDS BY PRESCRIBER/CLIN PHARMACIST DOCUMENTED: ICD-10-PCS | Mod: CPTII,S$GLB,, | Performed by: NURSE PRACTITIONER

## 2023-08-11 PROCEDURE — 1160F RVW MEDS BY RX/DR IN RCRD: CPT | Mod: CPTII,S$GLB,, | Performed by: NURSE PRACTITIONER

## 2023-08-11 PROCEDURE — 3077F PR MOST RECENT SYSTOLIC BLOOD PRESSURE >= 140 MM HG: ICD-10-PCS | Mod: CPTII,S$GLB,, | Performed by: NURSE PRACTITIONER

## 2023-08-11 PROCEDURE — 3079F DIAST BP 80-89 MM HG: CPT | Mod: CPTII,S$GLB,, | Performed by: NURSE PRACTITIONER

## 2023-08-11 PROCEDURE — 99215 PR OFFICE/OUTPT VISIT, EST, LEVL V, 40-54 MIN: ICD-10-PCS | Mod: S$GLB,,, | Performed by: NURSE PRACTITIONER

## 2023-08-11 PROCEDURE — 3008F BODY MASS INDEX DOCD: CPT | Mod: CPTII,S$GLB,, | Performed by: NURSE PRACTITIONER

## 2023-08-11 PROCEDURE — 99215 OFFICE O/P EST HI 40 MIN: CPT | Mod: S$GLB,,, | Performed by: NURSE PRACTITIONER

## 2023-08-11 PROCEDURE — 3079F PR MOST RECENT DIASTOLIC BLOOD PRESSURE 80-89 MM HG: ICD-10-PCS | Mod: CPTII,S$GLB,, | Performed by: NURSE PRACTITIONER

## 2023-08-11 PROCEDURE — 1159F PR MEDICATION LIST DOCUMENTED IN MEDICAL RECORD: ICD-10-PCS | Mod: CPTII,S$GLB,, | Performed by: NURSE PRACTITIONER

## 2023-08-11 PROCEDURE — 3077F SYST BP >= 140 MM HG: CPT | Mod: CPTII,S$GLB,, | Performed by: NURSE PRACTITIONER

## 2023-08-11 PROCEDURE — 3008F PR BODY MASS INDEX (BMI) DOCUMENTED: ICD-10-PCS | Mod: CPTII,S$GLB,, | Performed by: NURSE PRACTITIONER

## 2023-08-11 NOTE — PROGRESS NOTES
MEDICAL ONCOLOGY FOLLOW UP CONSULTATION NOTE    Chief Complaint: Small Cell Lung Cancer    HPI: Patient is a 56 year old female current smoker who underwent screening CT scan chest by her PCP which showed a suspicious mass arising from the left lower lobe with bronchial obstruction highly suspicious for malignancy. She presents to our clinic today for further evaluation. Denies weight loss, activity and appetite changes. Does complain of cough which she attributes it to smoking and says it is chronic with no changes.     Imaging:      Ct Chest 2/10/22:  Left lower mass significantly decreased.  Measures 4.1 x 3.2 x 4.3 cm compared to pretreatment dimensions of 10 cm on CT scan September 27, 2021 and PET-CT on October 11, 2021.        CT scan w/o contrast: 9/27/2021    1.  Category 4x: Very suspicious lesion. Large soft tissue mass effect in   the left lower lobe associated with bronchial obstruction highly suspicious    for malignancy.      Chest CT with or without contrast, CT/PET and/or tissue sampling depending   on the probability of malignancy and comorbidities. Consultation with   pulmonology is also recommended.         PET scan: 10/11/2021  == Large left lower lobe hypermetabolic pass consistent with malignancy.  It has a maximum            diameter on the CT of approximately 10 cm it shows intense radiotracer uptake with the SUV being 17.7      DIAGNOSIS:   11/12/2021 BOWERS/kml   LUNG, LEFT LOWER LOBE, EBUS-FNA:   -- SMALL CELL CARCINOMA.   -- SEE COMMENT.   Comment:   A panel of immunohistochemical stains are performed on block 1A to further   characterize the neoplasm.  The tumor cells are positive for pancytokeratin,   CK7, CK8/18, and CD56.  The cells of interest are negative for P40,   synaptophysin, TTF1, CD3, CD20, CK20, and S100.  The CK7 demonstrates some   dot-like pattern expression.  Controls performed as expected.  The above   findings support the diagnosis of small cell carcinoma.              Labs:  Lab Results   Component Value Date    WBC 9.1 06/19/2023    HGB 17.5 (H) 06/19/2023    HCT 46.6 06/19/2023    MCV 94.0 06/19/2023    LABPLAT 225 06/19/2023      Platelets   Date Value Ref Range Status   06/19/2023 225 142 - 424 10*3/uL Final     CMP  Sodium   Date Value Ref Range Status   06/19/2023 130 (L) 135 - 145 mmol/L Final     Potassium   Date Value Ref Range Status   06/19/2023 3.1 (L) 3.6 - 5.2 mmol/L Final     Chloride   Date Value Ref Range Status   06/19/2023 92 (L) 100 - 108 mmol/L Final     CO2   Date Value Ref Range Status   06/19/2023 26 21 - 32 mmol/L Final     Glucose   Date Value Ref Range Status   06/19/2023 93 70 - 110 mg/dL Final     BUN   Date Value Ref Range Status   06/19/2023 3 (L) 7 - 18 mg/dL Final     Creatinine   Date Value Ref Range Status   06/19/2023 0.61 0.55 - 1.02 mg/dL Final     Calcium   Date Value Ref Range Status   06/19/2023 9.5 8.8 - 10.5 mg/dL Final     Total Protein   Date Value Ref Range Status   06/13/2022 7.6 6.4 - 8.2 g/dL Final     Albumin   Date Value Ref Range Status   06/13/2022 3.7 3.4 - 5.0 g/dL Final     Total Bilirubin   Date Value Ref Range Status   06/13/2022 0.5 0.0 - 1.0 mg/dL Final     Alkaline Phosphatase   Date Value Ref Range Status   06/13/2022 128 (H) 46 - 116 U/L Final     AST   Date Value Ref Range Status   06/13/2022 37 15 - 37 U/L Final     ALT   Date Value Ref Range Status   06/13/2022 39 12 - 78 U/L Final     Anion Gap   Date Value Ref Range Status   06/19/2023 12.0 (H) 3.0 - 11.0 mmol/L Final     eGFR if    Date Value Ref Range Status   09/17/2021 124 > OR = 60 mL/min/1.73m2 Final     eGFR if non    Date Value Ref Range Status   09/17/2021 107 > OR = 60 mL/min/1.73m2 Final            Past Medical History:   Diagnosis Date    Allergy     Esophagitis     Fatty liver 08/29/2022    Diagnosed by Ultrasound which was order by Dr. Castillo.    GERD (gastroesophageal reflux disease)     Hiatal hernia      Schatzki's ring of distal esophagus     Small cell lung cancer 11/15/2021     Family History   Problem Relation Age of Onset    Emphysema Mother     Diabetes Mother     Diabetes Father     Alcohol abuse Father     No Known Problems Sister     No Known Problems Brother     No Known Problems Maternal Aunt     No Known Problems Maternal Uncle     No Known Problems Paternal Aunt     No Known Problems Paternal Uncle     No Known Problems Maternal Grandmother     No Known Problems Maternal Grandfather     No Known Problems Paternal Grandmother     No Known Problems Paternal Grandfather     No Known Problems Daughter      Social History     Socioeconomic History    Marital status:    Tobacco Use    Smoking status: Every Day     Current packs/day: 0.75     Average packs/day: 0.8 packs/day for 40.0 years (30.0 ttl pk-yrs)     Types: Cigarettes    Smokeless tobacco: Never   Substance and Sexual Activity    Alcohol use: Yes     Alcohol/week: 10.0 standard drinks of alcohol     Types: 10 Cans of beer per week    Drug use: Never    Sexual activity: Yes     Partners: Male     Birth control/protection: None     Past Surgical History:   Procedure Laterality Date    COLONOSCOPY       in [place] and he did the dialation     COLONOSCOPY  08/17/2022    ESOPHAGEAL DILATION      ESOPHAGOGASTRODUODENOSCOPY  08/10/2022    SPINAL FUSION      neck         Review of systems:  Review of Systems   Constitutional:  Negative for activity change, appetite change, chills, diaphoresis, fatigue and unexpected weight change.   HENT:  Negative for congestion, facial swelling, hearing loss, mouth sores, trouble swallowing and voice change.    Eyes:  Negative for photophobia, pain, discharge and itching.   Respiratory:  Negative for apnea, cough, choking, chest tightness, shortness of breath, wheezing and stridor.    Cardiovascular:  Negative for chest pain, palpitations and leg swelling.   Gastrointestinal:  Positive for nausea.  Negative for abdominal distention, abdominal pain, anal bleeding and blood in stool.   Endocrine: Negative for cold intolerance, heat intolerance, polydipsia and polyphagia.   Genitourinary:  Negative for difficulty urinating, dysuria, flank pain and hematuria.   Musculoskeletal:  Negative for arthralgias, back pain, joint swelling, myalgias, neck pain and neck stiffness.        +ve for joint pain   Skin:  Positive for rash. Negative for color change, pallor and wound.   Allergic/Immunologic: Negative for environmental allergies, food allergies and immunocompromised state.   Neurological:  Positive for headaches. Negative for dizziness, seizures, facial asymmetry, speech difficulty, light-headedness and numbness.   Hematological:  Negative for adenopathy. Does not bruise/bleed easily.   Psychiatric/Behavioral:  Negative for agitation, behavioral problems, confusion, decreased concentration and sleep disturbance.    All other systems reviewed and are negative.    Physical Exam  Vitals and nursing note reviewed.   Constitutional:       General: She is not in acute distress.     Appearance: Normal appearance. She is not ill-appearing.   HENT:      Head: Normocephalic and atraumatic.      Nose: No congestion or rhinorrhea.   Eyes:      General: No scleral icterus.     Extraocular Movements: Extraocular movements intact.      Pupils: Pupils are equal, round, and reactive to light.   Cardiovascular:      Rate and Rhythm: Normal rate and regular rhythm.      Pulses: Normal pulses.      Heart sounds: Normal heart sounds. No murmur heard.     No gallop.   Pulmonary:      Effort: Pulmonary effort is normal. No respiratory distress.      Breath sounds: Normal breath sounds. No stridor. No wheezing or rhonchi.      Comments: +ve for coarse breath sounds  Abdominal:      General: Bowel sounds are normal. There is no distension.      Palpations: There is no mass.      Tenderness: There is no abdominal tenderness. There is no  guarding.   Musculoskeletal:         General: No swelling, tenderness, deformity or signs of injury. Normal range of motion.      Cervical back: Normal range of motion and neck supple. No rigidity. No muscular tenderness.      Right lower leg: No edema.      Left lower leg: No edema.   Skin:     General: Skin is warm and dry.      Coloration: Skin is not jaundiced or pale.      Findings: Rash present. No bruising or lesion.   Neurological:      General: No focal deficit present.      Mental Status: She is alert and oriented to person, place, and time.      Cranial Nerves: No cranial nerve deficit.      Sensory: No sensory deficit.      Motor: No weakness.      Gait: Gait normal.   Psychiatric:         Mood and Affect: Mood normal.         Behavior: Behavior normal.         Thought Content: Thought content normal.     There were no vitals filed for this visit.  There is no height or weight on file to calculate BSA.  Imaging:                MRI Brain 7/5/22:        Assessment/Plan:      ECOG 1    1. Limited Stage Small cell carcinoma: T4N0M0, Stage IIIA    == Diagnosis via Left lower lobe EBUS: FNA.  == I do not feel resectability would be an option which is the preferred option per NCCN guidelines., looking at the size and location. No evidence of metastatic disease on imaging. However, on the last chest X-ray  she was noted to have suspected left sided effusion, this did not correlate with PET scan but obviously concerning.  She would like to seek a second opinion at [facility] and  I discussed this with her that while she does this, she should not delay start of her treatment considering the size of the mass. Our plan should be for concurrent chemoradiation.  12/21- 4/22 : Carbo/Etoposide + XRT completed,   04/05/2022: PET/CT showed partial response to treatment.   5/27/2022 completed prophylactic whole brain radiation  7/22/22: review of recent CT C/A/P shows stable disease with a 3 cm LLL mass noted with no  significant change as compared to 2/2022,  10/28/22: review of recent PET shows stable disease with decrease size and SUV of previous lung lesion. She reports weight loss, chronic nausea, fatigue, cough and SOB. Will reach out to pcp r/g weight loss and nausea, will add phenergan as well. Advised to f/u with pulm for chronic cough and SOB, will send out medrol dose pack.   6/1/23: doing ok, some weight loss due to chronic nausea, depression and fatigue. Requesting to try anit-depression medication, will send out zoloft and refer to nutritional services. Ambu ref to surgery for port removal   6/30/23: continues with chronic nausea vomitting will add reglan QID and compazine TID, tolerating zoloft well, will repeat PET   8/11/23: states reglan and comapzine has dramatically improved her chronic nausea and vomitting, still not gaining weight but is able to do more ADLs, PET 8/7/23 shows RADHA.     To Do:   ==RTC in 4 months  with labs         Total time spent in counseling and discussion about further management options including relevant lab work, treatment,  prognosis, medications and intended side effects was more than 45 minutes. More than 50% of the time was spent on counseling and coordination of care.  I spent a total of 45 minutes on the day of the visit.This includes face to face time and non-face to face time preparing to see the patient (eg, review of tests), Obtaining and/or reviewing separately obtained history, Documenting clinical information in the electronic or other health record, Independently interpreting resultsand communicating results to the patient/family/caregiver, or Care coordination.

## 2023-08-14 ENCOUNTER — OFFICE VISIT (OUTPATIENT)
Dept: PRIMARY CARE CLINIC | Facility: CLINIC | Age: 58
End: 2023-08-14
Payer: MEDICAID

## 2023-08-14 VITALS
HEART RATE: 97 BPM | OXYGEN SATURATION: 97 % | BODY MASS INDEX: 18.93 KG/M2 | SYSTOLIC BLOOD PRESSURE: 139 MMHG | DIASTOLIC BLOOD PRESSURE: 96 MMHG | WEIGHT: 117.81 LBS | HEIGHT: 66 IN

## 2023-08-14 DIAGNOSIS — F17.200 SMOKING ADDICTION: ICD-10-CM

## 2023-08-14 DIAGNOSIS — C34.90 SMALL CELL LUNG CANCER: ICD-10-CM

## 2023-08-14 DIAGNOSIS — J44.9 CHRONIC OBSTRUCTIVE PULMONARY DISEASE, UNSPECIFIED COPD TYPE: ICD-10-CM

## 2023-08-14 DIAGNOSIS — G25.81 RESTLESS LEG: ICD-10-CM

## 2023-08-14 DIAGNOSIS — R11.2 NAUSEA AND VOMITING, UNSPECIFIED VOMITING TYPE: Primary | ICD-10-CM

## 2023-08-14 PROCEDURE — 3075F SYST BP GE 130 - 139MM HG: CPT | Mod: CPTII,S$GLB,, | Performed by: INTERNAL MEDICINE

## 2023-08-14 PROCEDURE — 3008F PR BODY MASS INDEX (BMI) DOCUMENTED: ICD-10-PCS | Mod: CPTII,S$GLB,, | Performed by: INTERNAL MEDICINE

## 2023-08-14 PROCEDURE — 3080F DIAST BP >= 90 MM HG: CPT | Mod: CPTII,S$GLB,, | Performed by: INTERNAL MEDICINE

## 2023-08-14 PROCEDURE — 1159F PR MEDICATION LIST DOCUMENTED IN MEDICAL RECORD: ICD-10-PCS | Mod: CPTII,S$GLB,, | Performed by: INTERNAL MEDICINE

## 2023-08-14 PROCEDURE — 3075F PR MOST RECENT SYSTOLIC BLOOD PRESS GE 130-139MM HG: ICD-10-PCS | Mod: CPTII,S$GLB,, | Performed by: INTERNAL MEDICINE

## 2023-08-14 PROCEDURE — 99213 OFFICE O/P EST LOW 20 MIN: CPT | Mod: S$GLB,,, | Performed by: INTERNAL MEDICINE

## 2023-08-14 PROCEDURE — 1159F MED LIST DOCD IN RCRD: CPT | Mod: CPTII,S$GLB,, | Performed by: INTERNAL MEDICINE

## 2023-08-14 PROCEDURE — 99213 PR OFFICE/OUTPT VISIT, EST, LEVL III, 20-29 MIN: ICD-10-PCS | Mod: S$GLB,,, | Performed by: INTERNAL MEDICINE

## 2023-08-14 PROCEDURE — 3080F PR MOST RECENT DIASTOLIC BLOOD PRESSURE >= 90 MM HG: ICD-10-PCS | Mod: CPTII,S$GLB,, | Performed by: INTERNAL MEDICINE

## 2023-08-14 PROCEDURE — 3008F BODY MASS INDEX DOCD: CPT | Mod: CPTII,S$GLB,, | Performed by: INTERNAL MEDICINE

## 2023-08-14 NOTE — PROGRESS NOTES
Subjective:      Patient ID: Diana Prather is a 57 y.o. female.    Chief Complaint: Follow-up    HPI    Past Medical History:   Diagnosis Date    Allergy     Esophagitis     Fatty liver 08/29/2022    Diagnosed by Ultrasound which was order by Dr. Castillo.    GERD (gastroesophageal reflux disease)     Hiatal hernia     Schatzki's ring of distal esophagus     Small cell lung cancer 11/15/2021     Patient with above medical problems here fro follow up  Recently placed on compazine and reglan with improvement in nausea but still not gaining weight   She had her port removed  COPD from smoking and will let her pulmonologist know when she is ready to go to smoking cessation  No acute complaints      Review of Systems   Constitutional:  Positive for malaise/fatigue and weight loss. Negative for chills and fever.   HENT:  Negative for hearing loss.    Eyes:  Negative for blurred vision.   Respiratory:  Positive for cough and shortness of breath. Negative for wheezing.    Cardiovascular:  Negative for chest pain, palpitations and leg swelling.   Gastrointestinal:  Positive for nausea. Negative for abdominal pain, blood in stool, constipation, diarrhea, melena and vomiting.   Genitourinary:  Negative for dysuria, frequency and urgency.   Musculoskeletal:  Negative for falls.   Skin:  Negative for rash.   Neurological:  Negative for dizziness and headaches.   Endo/Heme/Allergies:  Does not bruise/bleed easily.   Psychiatric/Behavioral:  Negative for depression. The patient is not nervous/anxious.      Objective:     Physical Exam  Vitals reviewed.   Constitutional:       Appearance: Normal appearance.   HENT:      Head: Normocephalic.      Mouth/Throat:      Mouth: Mucous membranes are moist.      Pharynx: Oropharynx is clear.   Eyes:      Extraocular Movements: Extraocular movements intact.      Conjunctiva/sclera: Conjunctivae normal.      Pupils: Pupils are equal, round, and reactive to light.   Cardiovascular:       "Rate and Rhythm: Normal rate and regular rhythm.   Pulmonary:      Effort: Pulmonary effort is normal.      Breath sounds: Normal breath sounds.   Abdominal:      General: Bowel sounds are normal.   Musculoskeletal:      Right lower leg: No edema.      Left lower leg: No edema.   Skin:     General: Skin is warm.      Capillary Refill: Capillary refill takes less than 2 seconds.   Neurological:      General: No focal deficit present.      Mental Status: She is alert and oriented to person, place, and time.   Psychiatric:         Mood and Affect: Mood normal.       BP (!) 139/96 (BP Location: Right arm, Patient Position: Sitting, BP Method: Medium (Automatic))   Pulse 97   Ht 5' 6" (1.676 m)   Wt 53.4 kg (117 lb 12.8 oz)   LMP  (LMP Unknown)   SpO2 97%   BMI 19.01 kg/m²     Assessment:       ICD-10-CM ICD-9-CM   1. Nausea and vomiting, unspecified vomiting type  R11.2 787.01   2. Small cell lung cancer  C34.90 162.9   3. Restless leg  G25.81 333.94   4. Smoking addiction  F17.200 305.1   5. Chronic obstructive pulmonary disease, unspecified COPD type  J44.9 496       Plan:     Medication List with Changes/Refills   Current Medications    ALBUTEROL (PROVENTIL/VENTOLIN HFA) 90 MCG/ACTUATION INHALER    INHALE 2 PUFFS INTO THE LUNGS EVERY 6 HOURS AS NEEDED FOR WHEEZING, RESCUE    ALBUTEROL-IPRATROPIUM (DUO-NEB) 2.5 MG-0.5 MG/3 ML NEBULIZER SOLUTION    Take 3 mLs by nebulization every 6 (six) hours as needed for Wheezing. Rescue    AMITRIPTYLINE (ELAVIL) 25 MG TABLET    Take 1 tablet (25 mg total) by mouth nightly as needed for Pain.    BUDESONIDE-FORMOTEROL 160-4.5 MCG (SYMBICORT) 160-4.5 MCG/ACTUATION HFAA    Inhale 2 puffs into the lungs every 12 (twelve) hours. Controller    CETIRIZINE (ZYRTEC) 10 MG TABLET    Take 1 tablet (10 mg total) by mouth once daily.    FAMOTIDINE (PEPCID) 20 MG TABLET    Take 1 tablet (20 mg total) by mouth 2 (two) times daily.    FLUTICASONE PROPIONATE (FLONASE) 50 MCG/ACTUATION NASAL " SPRAY    1 spray (50 mcg total) by Each Nostril route once daily.    GABAPENTIN (NEURONTIN) 400 MG CAPSULE    Take 1 capsule (400 mg total) by mouth 3 (three) times daily.    HYDROCODONE-ACETAMINOPHEN (NORCO) 5-325 MG PER TABLET        LIDOCAINE VISCOUS 2 % SOLUTION        METOCLOPRAMIDE HCL (REGLAN) 5 MG TABLET    Take 1 tablet (5 mg total) by mouth 4 (four) times daily before meals and nightly.    PANTOPRAZOLE (PROTONIX) 40 MG TABLET    Take 1 tablet (40 mg total) by mouth once daily.    PROCHLORPERAZINE (COMPAZINE) 10 MG TABLET    Take 1 tablet (10 mg total) by mouth 3 (three) times daily.    SERTRALINE (ZOLOFT) 50 MG TABLET    Take 1 tablet (50 mg total) by mouth once daily.    TIOTROPIUM BROMIDE (SPIRIVA RESPIMAT) 2.5 MCG/ACTUATION INHALER    Inhale 2 puffs into the lungs Daily. Controller   Discontinued Medications    PROMETHAZINE (PHENERGAN) 25 MG TABLET    TAKE 1 TABLET(25 MG) BY MOUTH EVERY 8 HOURS Strength: 25 mg        1. Nausea and vomiting, unspecified vomiting type    2. Small cell lung cancer    3. Restless leg    4. Smoking addiction    5. Chronic obstructive pulmonary disease, unspecified COPD type         Management per Oncology and Pulmonology    RTC sooner if needed         Future Appointments   Date Time Provider Department Center   12/15/2023 10:00 AM Smita Plaza NP [facility] [id number]   1/16/2024  1:30 PM Darby Lind NP [id number] [address]   2/8/2024 12:00 PM Krystin Gomez [facility]   2/14/2024  1:40 PM Mirian Robertson MD [id number] [address]

## 2023-08-22 RX ORDER — TIOTROPIUM BROMIDE INHALATION SPRAY 3.12 UG/1
2 SPRAY, METERED RESPIRATORY (INHALATION)
Qty: 4 G | Refills: 6 | Status: SHIPPED | OUTPATIENT
Start: 2023-08-22 | End: 2024-03-04 | Stop reason: ALTCHOICE

## 2023-08-31 ENCOUNTER — TELEPHONE (OUTPATIENT)
Dept: ADMINISTRATIVE | Facility: HOSPITAL | Age: 58
End: 2023-08-31
Payer: MEDICAID

## 2023-08-31 ENCOUNTER — PATIENT OUTREACH (OUTPATIENT)
Dept: ADMINISTRATIVE | Facility: HOSPITAL | Age: 58
End: 2023-08-31
Payer: MEDICAID

## 2023-08-31 VITALS — DIASTOLIC BLOOD PRESSURE: 80 MMHG | SYSTOLIC BLOOD PRESSURE: 105 MMHG

## 2023-08-31 NOTE — PROGRESS NOTES
HTN REPORT: spoke to pt she states she need a minute to find her cuff, she will take bp and call me back.  Pt has follow up appt scheduled 02/14/24 with Dr Robertson

## 2023-09-01 DIAGNOSIS — R11.2 NAUSEA AND VOMITING, UNSPECIFIED VOMITING TYPE: ICD-10-CM

## 2023-09-05 RX ORDER — AMITRIPTYLINE HYDROCHLORIDE 25 MG/1
25 TABLET, FILM COATED ORAL NIGHTLY PRN
Qty: 30 TABLET | Refills: 2 | Status: SHIPPED | OUTPATIENT
Start: 2023-09-05 | End: 2023-12-06

## 2023-12-04 DIAGNOSIS — R11.2 NAUSEA AND VOMITING, UNSPECIFIED VOMITING TYPE: ICD-10-CM

## 2023-12-04 DIAGNOSIS — F32.A DEPRESSION, UNSPECIFIED DEPRESSION TYPE: ICD-10-CM

## 2023-12-04 RX ORDER — SERTRALINE HYDROCHLORIDE 50 MG/1
50 TABLET, FILM COATED ORAL
Qty: 30 TABLET | Refills: 0 | Status: SHIPPED | OUTPATIENT
Start: 2023-12-04 | End: 2024-01-08

## 2023-12-06 RX ORDER — AMITRIPTYLINE HYDROCHLORIDE 25 MG/1
TABLET, FILM COATED ORAL
Qty: 30 TABLET | Refills: 1 | Status: SHIPPED | OUTPATIENT
Start: 2023-12-06 | End: 2024-02-01

## 2023-12-15 ENCOUNTER — OFFICE VISIT (OUTPATIENT)
Dept: HEMATOLOGY/ONCOLOGY | Facility: CLINIC | Age: 58
End: 2023-12-15
Payer: MEDICARE

## 2023-12-15 VITALS — BODY MASS INDEX: 18.8 KG/M2 | WEIGHT: 116.5 LBS

## 2023-12-15 DIAGNOSIS — C34.92 SMALL CELL LUNG CANCER, LEFT: ICD-10-CM

## 2023-12-15 DIAGNOSIS — F17.200 SMOKING ADDICTION: Primary | ICD-10-CM

## 2023-12-15 PROCEDURE — 99406 BEHAV CHNG SMOKING 3-10 MIN: CPT | Mod: S$GLB,,, | Performed by: NURSE PRACTITIONER

## 2023-12-15 PROCEDURE — 99214 OFFICE O/P EST MOD 30 MIN: CPT | Mod: 25,S$GLB,, | Performed by: NURSE PRACTITIONER

## 2023-12-15 PROCEDURE — 99406 PR TOBACCO USE CESSATION INTERMEDIATE 3-10 MINUTES: ICD-10-PCS | Mod: S$GLB,,, | Performed by: NURSE PRACTITIONER

## 2023-12-15 PROCEDURE — 99214 PR OFFICE/OUTPT VISIT, EST, LEVL IV, 30-39 MIN: ICD-10-PCS | Mod: 25,S$GLB,, | Performed by: NURSE PRACTITIONER

## 2023-12-15 NOTE — PROGRESS NOTES
MEDICAL ONCOLOGY FOLLOW UP CONSULTATION NOTE    Chief Complaint: Small Cell Lung Cancer    HPI: Patient is a 56 year old female current smoker who underwent screening CT scan chest by her PCP which showed a suspicious mass arising from the left lower lobe with bronchial obstruction highly suspicious for malignancy. She presents to our clinic today for further evaluation. Denies weight loss, activity and appetite changes. Does complain of cough which she attributes it to smoking and says it is chronic with no changes.     Imaging:      Ct Chest 2/10/22:  Left lower mass significantly decreased.  Measures 4.1 x 3.2 x 4.3 cm compared to pretreatment dimensions of 10 cm on CT scan September 27, 2021 and PET-CT on October 11, 2021.        CT scan w/o contrast: 9/27/2021    1.  Category 4x: Very suspicious lesion. Large soft tissue mass effect in   the left lower lobe associated with bronchial obstruction highly suspicious    for malignancy.      Chest CT with or without contrast, CT/PET and/or tissue sampling depending   on the probability of malignancy and comorbidities. Consultation with   pulmonology is also recommended.         PET scan: 10/11/2021  == Large left lower lobe hypermetabolic pass consistent with malignancy.  It has a maximum            diameter on the CT of approximately 10 cm it shows intense radiotracer uptake with the SUV being 17.7      DIAGNOSIS:   11/12/2021 BOWERS/kml   LUNG, LEFT LOWER LOBE, EBUS-FNA:   -- SMALL CELL CARCINOMA.   -- SEE COMMENT.   Comment:   A panel of immunohistochemical stains are performed on block 1A to further   characterize the neoplasm.  The tumor cells are positive for pancytokeratin,   CK7, CK8/18, and CD56.  The cells of interest are negative for P40,   synaptophysin, TTF1, CD3, CD20, CK20, and S100.  The CK7 demonstrates some   dot-like pattern expression.  Controls performed as expected.  The above   findings support the diagnosis of small cell carcinoma.              Labs:  Lab Results   Component Value Date    WBC 9.1 06/19/2023    HGB 17.5 (H) 06/19/2023    HCT 46.6 06/19/2023    MCV 94.0 06/19/2023    LABPLAT 225 06/19/2023      Platelets   Date Value Ref Range Status   06/19/2023 225 142 - 424 10*3/uL Final     CMP  Sodium   Date Value Ref Range Status   06/19/2023 130 (L) 135 - 145 mmol/L Final     Potassium   Date Value Ref Range Status   06/19/2023 3.1 (L) 3.6 - 5.2 mmol/L Final     Chloride   Date Value Ref Range Status   06/19/2023 92 (L) 100 - 108 mmol/L Final     CO2   Date Value Ref Range Status   06/19/2023 26 21 - 32 mmol/L Final     Glucose   Date Value Ref Range Status   06/19/2023 93 70 - 110 mg/dL Final     BUN   Date Value Ref Range Status   06/19/2023 3 (L) 7 - 18 mg/dL Final     Creatinine   Date Value Ref Range Status   06/19/2023 0.61 0.55 - 1.02 mg/dL Final     Calcium   Date Value Ref Range Status   06/19/2023 9.5 8.8 - 10.5 mg/dL Final     Total Protein   Date Value Ref Range Status   06/13/2022 7.6 6.4 - 8.2 g/dL Final     Albumin   Date Value Ref Range Status   06/13/2022 3.7 3.4 - 5.0 g/dL Final     Total Bilirubin   Date Value Ref Range Status   06/13/2022 0.5 0.0 - 1.0 mg/dL Final     Alkaline Phosphatase   Date Value Ref Range Status   06/13/2022 128 (H) 46 - 116 U/L Final     AST   Date Value Ref Range Status   06/13/2022 37 15 - 37 U/L Final     ALT   Date Value Ref Range Status   06/13/2022 39 12 - 78 U/L Final     Anion Gap   Date Value Ref Range Status   06/19/2023 12.0 (H) 3.0 - 11.0 mmol/L Final     eGFR if    Date Value Ref Range Status   09/17/2021 124 > OR = 60 mL/min/1.73m2 Final     eGFR if non    Date Value Ref Range Status   09/17/2021 107 > OR = 60 mL/min/1.73m2 Final            Past Medical History:   Diagnosis Date    Allergy     Esophagitis     Fatty liver 08/29/2022    Diagnosed by Ultrasound which was order by Dr. Castillo.    GERD (gastroesophageal reflux disease)     Hiatal hernia      Schatzki's ring of distal esophagus     Small cell lung cancer 11/15/2021     Family History   Problem Relation Age of Onset    Emphysema Mother     Diabetes Mother     Diabetes Father     Alcohol abuse Father     No Known Problems Sister     No Known Problems Brother     No Known Problems Maternal Aunt     No Known Problems Maternal Uncle     No Known Problems Paternal Aunt     No Known Problems Paternal Uncle     No Known Problems Maternal Grandmother     No Known Problems Maternal Grandfather     No Known Problems Paternal Grandmother     No Known Problems Paternal Grandfather     No Known Problems Daughter      Social History     Socioeconomic History    Marital status:    Tobacco Use    Smoking status: Every Day     Current packs/day: 0.75     Average packs/day: 0.8 packs/day for 40.0 years (30.0 ttl pk-yrs)     Types: Cigarettes    Smokeless tobacco: Never   Substance and Sexual Activity    Alcohol use: Yes     Alcohol/week: 10.0 standard drinks of alcohol     Types: 10 Cans of beer per week    Drug use: Never    Sexual activity: Yes     Partners: Male     Birth control/protection: None     Past Surgical History:   Procedure Laterality Date    COLONOSCOPY       in [place] and he did the dialation     COLONOSCOPY  08/17/2022    ESOPHAGEAL DILATION      ESOPHAGOGASTRODUODENOSCOPY  08/10/2022    SPINAL FUSION      neck         Review of systems:  Review of Systems   Constitutional:  Negative for activity change, appetite change, chills, diaphoresis, fatigue and unexpected weight change.   HENT:  Negative for congestion, facial swelling, hearing loss, mouth sores, trouble swallowing and voice change.    Eyes:  Negative for photophobia, pain, discharge and itching.   Respiratory:  Negative for apnea, cough, choking, chest tightness, shortness of breath, wheezing and stridor.    Cardiovascular:  Negative for chest pain, palpitations and leg swelling.   Gastrointestinal:  Positive for nausea.  Negative for abdominal distention, abdominal pain, anal bleeding and blood in stool.   Endocrine: Negative for cold intolerance, heat intolerance, polydipsia and polyphagia.   Genitourinary:  Negative for difficulty urinating, dysuria, flank pain and hematuria.   Musculoskeletal:  Negative for arthralgias, back pain, joint swelling, myalgias, neck pain and neck stiffness.        +ve for joint pain   Skin:  Positive for rash. Negative for color change, pallor and wound.   Allergic/Immunologic: Negative for environmental allergies, food allergies and immunocompromised state.   Neurological:  Positive for headaches. Negative for dizziness, seizures, facial asymmetry, speech difficulty, light-headedness and numbness.   Hematological:  Negative for adenopathy. Does not bruise/bleed easily.   Psychiatric/Behavioral:  Negative for agitation, behavioral problems, confusion, decreased concentration and sleep disturbance.    All other systems reviewed and are negative.      Physical Exam  Vitals and nursing note reviewed.   Constitutional:       General: She is not in acute distress.     Appearance: Normal appearance. She is not ill-appearing.   HENT:      Head: Normocephalic and atraumatic.      Nose: No congestion or rhinorrhea.   Eyes:      General: No scleral icterus.     Extraocular Movements: Extraocular movements intact.      Pupils: Pupils are equal, round, and reactive to light.   Cardiovascular:      Rate and Rhythm: Normal rate and regular rhythm.      Pulses: Normal pulses.      Heart sounds: Normal heart sounds. No murmur heard.     No gallop.   Pulmonary:      Effort: Pulmonary effort is normal. No respiratory distress.      Breath sounds: Normal breath sounds. No stridor. No wheezing or rhonchi.      Comments: +ve for coarse breath sounds  Abdominal:      General: Bowel sounds are normal. There is no distension.      Palpations: There is no mass.      Tenderness: There is no abdominal tenderness. There is no  guarding.   Musculoskeletal:         General: No swelling, tenderness, deformity or signs of injury. Normal range of motion.      Cervical back: Normal range of motion and neck supple. No rigidity. No muscular tenderness.      Right lower leg: No edema.      Left lower leg: No edema.   Skin:     General: Skin is warm and dry.      Coloration: Skin is not jaundiced or pale.      Findings: Rash present. No bruising or lesion.   Neurological:      General: No focal deficit present.      Mental Status: She is alert and oriented to person, place, and time.      Cranial Nerves: No cranial nerve deficit.      Sensory: No sensory deficit.      Motor: No weakness.      Gait: Gait normal.   Psychiatric:         Mood and Affect: Mood normal.         Behavior: Behavior normal.         Thought Content: Thought content normal.       There were no vitals filed for this visit.  Body surface area is 1.57 meters squared.  Imaging:                              Assessment/Plan:      ECOG 1    1. Limited Stage Small cell carcinoma: T4N0M0, Stage IIIA 09/2021    == Diagnosis via Left lower lobe EBUS: FNA.  == I do not feel resectability would be an option which is the preferred option per NCCN guidelines., looking at the size and location. No evidence of metastatic disease on imaging. However, on the last chest X-ray  she was noted to have suspected left sided effusion, this did not correlate with PET scan but obviously concerning.  She would like to seek a second opinion at [facility] and  I discussed this with her that while she does this, she should not delay start of her treatment considering the size of the mass. Our plan should be for concurrent chemoradiation.  12/21- 4/22 : Carbo/Etoposide + XRT completed,   04/05/2022: PET/CT showed partial response to treatment.   5/27/2022 completed prophylactic whole brain radiation  7/22/22: review of recent CT C/A/P shows stable disease with a 3 cm LLL mass noted with no significant change  as compared to 2/2022,  10/28/22: review of recent PET shows stable disease with decrease size and SUV of previous lung lesion. She reports weight loss, chronic nausea, fatigue, cough and SOB. Will reach out to pcp r/g weight loss and nausea, will add phenergan as well. Advised to f/u with pulm for chronic cough and SOB, will send out medrol dose pack.   6/1/23: doing ok, some weight loss due to chronic nausea, depression and fatigue. Requesting to try anit-depression medication, will send out zoloft and refer to nutritional services. Ambu ref to surgery for port removal   6/30/23: continues with chronic nausea vomitting will add reglan QID and compazine TID, tolerating zoloft well, will repeat PET   8/11/23: states reglan and comapzine has dramatically improved her chronic nausea and vomitting, still not gaining weight but is able to do more ADLs, PET 8/7/23 shows RADHA.   12/15/23: feeling better with good days and bad days of nausea but does report improvement in ability to eat. Chronic cough, usually frothy mucous, continues to smoke daily, denies any hemoptysis or weight loss.     2. Active smoker  Assistance with smoking cessation was offered, including:  []  Medications  [x]  Counseling  []  Printed Information on Smoking Cessation  [x]  Referral to a Smoking Cessation Program    Patient was counseled regarding smoking for 3-10 minutes.     To Do:   == referral to smoking cessation  ==RTC in 3 months with Ct c/a/p   No labs needed         Total time spent in counseling and discussion about further management options including relevant lab work, treatment,  prognosis, medications and intended side effects was more than 45 minutes. More than 50% of the time was spent on counseling and coordination of care.  I spent a total of 45 minutes on the day of the visit.This includes face to face time and non-face to face time preparing to see the patient (eg, review of tests), Obtaining and/or reviewing separately  obtained history, Documenting clinical information in the electronic or other health record, Independently interpreting resultsand communicating results to the patient/family/caregiver, or Care coordination.

## 2023-12-20 DIAGNOSIS — R11.2 NAUSEA AND VOMITING, UNSPECIFIED VOMITING TYPE: ICD-10-CM

## 2024-01-04 ENCOUNTER — TELEPHONE (OUTPATIENT)
Dept: HEMATOLOGY/ONCOLOGY | Facility: CLINIC | Age: 59
End: 2024-01-04
Payer: MEDICARE

## 2024-01-08 ENCOUNTER — CLINICAL SUPPORT (OUTPATIENT)
Dept: SMOKING CESSATION | Facility: CLINIC | Age: 59
End: 2024-01-08

## 2024-01-08 ENCOUNTER — TELEPHONE (OUTPATIENT)
Dept: SMOKING CESSATION | Facility: CLINIC | Age: 59
End: 2024-01-08
Payer: MEDICARE

## 2024-01-08 DIAGNOSIS — F32.A DEPRESSION, UNSPECIFIED DEPRESSION TYPE: ICD-10-CM

## 2024-01-08 DIAGNOSIS — F17.200 NICOTINE DEPENDENCE: Primary | ICD-10-CM

## 2024-01-08 RX ORDER — VARENICLINE TARTRATE 1 MG/1
1 TABLET, FILM COATED ORAL 2 TIMES DAILY
Qty: 56 TABLET | Refills: 1 | Status: SHIPPED | OUTPATIENT
Start: 2024-01-08 | End: 2024-03-12

## 2024-01-08 RX ORDER — SERTRALINE HYDROCHLORIDE 50 MG/1
50 TABLET, FILM COATED ORAL
Qty: 30 TABLET | Refills: 0 | Status: SHIPPED | OUTPATIENT
Start: 2024-01-08 | End: 2024-02-01

## 2024-01-08 RX ORDER — VARENICLINE TARTRATE 0.5 MG/1
TABLET, FILM COATED ORAL
Qty: 11 TABLET | Refills: 0 | Status: SHIPPED | OUTPATIENT
Start: 2024-01-08 | End: 2024-03-12

## 2024-01-08 NOTE — TELEPHONE ENCOUNTER
Spoke with patient regarding scheduled [facility] appointment.  Due to anticipated inclement weather, counselor requested appt be conducted via phone.  Patient agreed to change.

## 2024-01-10 ENCOUNTER — TELEPHONE (OUTPATIENT)
Dept: HEMATOLOGY/ONCOLOGY | Facility: CLINIC | Age: 59
End: 2024-01-10
Payer: MEDICARE

## 2024-01-10 NOTE — TELEPHONE ENCOUNTER
Spoke with patient to let her know her scans were authorized and to call and have them rescheduled.

## 2024-01-22 ENCOUNTER — CLINICAL SUPPORT (OUTPATIENT)
Dept: SMOKING CESSATION | Facility: CLINIC | Age: 59
End: 2024-01-22

## 2024-01-22 DIAGNOSIS — F17.200 NICOTINE DEPENDENCE: Primary | ICD-10-CM

## 2024-01-22 NOTE — PROGRESS NOTES
Individual Follow-Up Form    1/22/2024    Quit Date: TBD    Clinical Status of Patient: Outpatient    Length of Service: 45 minutes    Continuing Medication: yes  Chantix     Target Symptoms: Withdrawal and medication side effects. The following were  rated moderate (3) to severe (4) on TCRS:  Moderate (3): None Reported  Severe (4): None Reported    Comments: Spoke with patient at length regarding tobacco cessation progress. Patient remains on prescribed tobacco cessation medication 1mg Chantix without any negative side effects at this time.  Patient decreased to 10 cigarettes daily.  Patient will cut down to 8 cigarettes a day this week.  Reviewed strategies, cues, and triggers. Introduced the negative impact of tobacco on health, the health advantages of discontinuing the use of tobacco, time line improved health changes after a quit, withdrawal issues to expect from nicotine and habit, and ways to achieve the goal of a quit.  Counselor discussed the 4ds (delay, drink, distract, deep breathing) to address nicotine cravings.  Counselor suggested that patient set daily smoking limits by limiting the number of cigarettes she has access to daily.  Counselor also suggested that patient utilize jolly ranchers, dum dum lollipops, and toffee candy to help control nicotine cravings.  Patient appeared to be receptive to the information given.  Counselor will continue to motivate patient to be tobacco free.    Diagnosis: F17.200    Next Visit: 1 week

## 2024-01-30 ENCOUNTER — CLINICAL SUPPORT (OUTPATIENT)
Dept: SMOKING CESSATION | Facility: CLINIC | Age: 59
End: 2024-01-30

## 2024-01-30 DIAGNOSIS — F17.200 NICOTINE DEPENDENCE: Primary | ICD-10-CM

## 2024-02-01 DIAGNOSIS — R11.2 NAUSEA AND VOMITING, UNSPECIFIED VOMITING TYPE: ICD-10-CM

## 2024-02-01 DIAGNOSIS — F32.A DEPRESSION, UNSPECIFIED DEPRESSION TYPE: ICD-10-CM

## 2024-02-01 RX ORDER — SERTRALINE HYDROCHLORIDE 50 MG/1
50 TABLET, FILM COATED ORAL
Qty: 30 TABLET | Refills: 0 | Status: SHIPPED | OUTPATIENT
Start: 2024-02-01

## 2024-02-01 RX ORDER — AMITRIPTYLINE HYDROCHLORIDE 25 MG/1
25 TABLET, FILM COATED ORAL NIGHTLY PRN
Qty: 30 TABLET | Refills: 3 | Status: SHIPPED | OUTPATIENT
Start: 2024-02-01 | End: 2024-02-26 | Stop reason: ALTCHOICE

## 2024-02-01 RX ORDER — SERTRALINE HYDROCHLORIDE 50 MG/1
50 TABLET, FILM COATED ORAL
Qty: 30 TABLET | Refills: 0 | OUTPATIENT
Start: 2024-02-01

## 2024-02-06 ENCOUNTER — CLINICAL SUPPORT (OUTPATIENT)
Dept: SMOKING CESSATION | Facility: CLINIC | Age: 59
End: 2024-02-06

## 2024-02-06 DIAGNOSIS — F17.200 NICOTINE DEPENDENCE: Primary | ICD-10-CM

## 2024-02-06 NOTE — PROGRESS NOTES
Individual Follow-Up Form    2/6/2024    Quit Date: TBD    Clinical Status of Patient: Outpatient    Length of Service: 30 minutes    Continuing Medication: yes  Chantix     Target Symptoms: Withdrawal and medication side effects. The following were  rated moderate (3) to severe (4) on TCRS:  Moderate (3): None Reported  Severe (4): None Reported    Comments: Spoke with patient at length via phone regarding tobacco cessation progress. Patient remains on prescribed tobacco cessation medication .5mg and 1mg Chantix without any negative side effects at this time.  Patient remains smoking 8-10 cigarettes per day.  Patient will cut down to 7 cigarettes a day this week.  Reviewed strategies, controlling environment, cues, triggers, new goals set. Introduced high risk situations with preparation interventions, caffeine similarities with withdrawal issues of habit and nicotine, Alcohol, Understanding urges, cravings, stress and relaxation. Open discussion with intervention discussion.  Patient states that she is currently on her 2nd day of taking her prescribed medications.  Counselor reviewed over the appropriate way to take medication and advised patient to eat a meal and drink plenty of water.  Counselor also reviewed over the affect of the medication as she transitions from week one to week two.  Patient appeared to be receptive to the information given.  Counselor will continue to motivate patient to be tobacco free.    Diagnosis: F17.200    Next Visit: 1 week

## 2024-02-13 ENCOUNTER — CLINICAL SUPPORT (OUTPATIENT)
Dept: SMOKING CESSATION | Facility: CLINIC | Age: 59
End: 2024-02-13

## 2024-02-13 DIAGNOSIS — F17.200 NICOTINE DEPENDENCE: Primary | ICD-10-CM

## 2024-02-13 NOTE — PROGRESS NOTES
Individual Follow-Up Form    2/13/2024    Quit Date: TBD    Clinical Status of Patient: Outpatient    Length of Service: 30 minutes    Continuing Medication: yes  Chantix     Target Symptoms: Withdrawal and medication side effects. The following were  rated moderate (3) to severe (4) on TCRS:  Moderate (3): None Reported  Severe (4): None Reported    Comments: Spoke with patient at length via phone regarding tobacco cessation progress. Patient remains on prescribed tobacco cessation medication 1mg Chantix without any negative side effects at this time.  Patient decreased to 7-8 cigarettes daily.  Patient will cut down to 7 cigarettes a day this week.  Reviewed strategies, habitual behavior, stress, and high risk situations. Introduced stress with addition interventions, SOLVE, relaxation with interventions, nutrition, exercise, weight gain, and the importance of rewarding oneself for accomplishments toward becoming tobacco free. Open discussion of all items with interventions.  Counselor suggested that patient utilize jolly ranchers, dum dum lollipops, and toffee candy to help control nicotine cravings.  Counselor also suggested that patient set daily smoking limits by limiting the number of cigarettes she has access to daily.  Patient appeared to be receptive to the information given.  Counselor will continue to motivate patient to be tobacco free.    Diagnosis: F17.200    Next Visit: 1 week

## 2024-02-20 ENCOUNTER — TELEPHONE (OUTPATIENT)
Dept: SMOKING CESSATION | Facility: CLINIC | Age: 59
End: 2024-02-20
Payer: MEDICARE

## 2024-02-20 NOTE — TELEPHONE ENCOUNTER
Attempted to contact patient to conduct a scheduled follow up appointment.  Patient did not answer.  Counselor left a voice message with name and contact information.

## 2024-02-26 ENCOUNTER — OFFICE VISIT (OUTPATIENT)
Dept: PRIMARY CARE CLINIC | Facility: CLINIC | Age: 59
End: 2024-02-26
Payer: MEDICARE

## 2024-02-26 ENCOUNTER — TELEPHONE (OUTPATIENT)
Dept: SMOKING CESSATION | Facility: CLINIC | Age: 59
End: 2024-02-26
Payer: MEDICARE

## 2024-02-26 ENCOUNTER — TELEPHONE (OUTPATIENT)
Dept: PRIMARY CARE CLINIC | Facility: CLINIC | Age: 59
End: 2024-02-26

## 2024-02-26 VITALS
DIASTOLIC BLOOD PRESSURE: 88 MMHG | WEIGHT: 111 LBS | SYSTOLIC BLOOD PRESSURE: 136 MMHG | HEIGHT: 66 IN | BODY MASS INDEX: 17.84 KG/M2 | HEART RATE: 108 BPM | OXYGEN SATURATION: 96 %

## 2024-02-26 DIAGNOSIS — Z23 FLU VACCINE NEED: Primary | ICD-10-CM

## 2024-02-26 DIAGNOSIS — Z12.31 ENCOUNTER FOR SCREENING MAMMOGRAM FOR MALIGNANT NEOPLASM OF BREAST: ICD-10-CM

## 2024-02-26 DIAGNOSIS — R63.4 WEIGHT LOSS: ICD-10-CM

## 2024-02-26 DIAGNOSIS — C34.92 SMALL CELL LUNG CANCER, LEFT: ICD-10-CM

## 2024-02-26 DIAGNOSIS — Z12.39 ENCOUNTER FOR SCREENING FOR MALIGNANT NEOPLASM OF BREAST, UNSPECIFIED SCREENING MODALITY: ICD-10-CM

## 2024-02-26 DIAGNOSIS — K21.9 GASTROESOPHAGEAL REFLUX DISEASE, UNSPECIFIED WHETHER ESOPHAGITIS PRESENT: ICD-10-CM

## 2024-02-26 DIAGNOSIS — J44.9 CHRONIC OBSTRUCTIVE PULMONARY DISEASE, UNSPECIFIED COPD TYPE: ICD-10-CM

## 2024-02-26 PROCEDURE — 3075F SYST BP GE 130 - 139MM HG: CPT | Mod: CPTII,S$GLB,, | Performed by: INTERNAL MEDICINE

## 2024-02-26 PROCEDURE — 99213 OFFICE O/P EST LOW 20 MIN: CPT | Mod: S$GLB,,, | Performed by: INTERNAL MEDICINE

## 2024-02-26 PROCEDURE — 3008F BODY MASS INDEX DOCD: CPT | Mod: CPTII,S$GLB,, | Performed by: INTERNAL MEDICINE

## 2024-02-26 PROCEDURE — 90686 IIV4 VACC NO PRSV 0.5 ML IM: CPT | Mod: S$GLB,,, | Performed by: INTERNAL MEDICINE

## 2024-02-26 PROCEDURE — 3079F DIAST BP 80-89 MM HG: CPT | Mod: CPTII,S$GLB,, | Performed by: INTERNAL MEDICINE

## 2024-02-26 PROCEDURE — G0008 ADMIN INFLUENZA VIRUS VAC: HCPCS | Mod: S$GLB,,, | Performed by: INTERNAL MEDICINE

## 2024-02-26 PROCEDURE — 1159F MED LIST DOCD IN RCRD: CPT | Mod: CPTII,S$GLB,, | Performed by: INTERNAL MEDICINE

## 2024-02-26 RX ORDER — PANTOPRAZOLE SODIUM 40 MG/1
40 TABLET, DELAYED RELEASE ORAL DAILY
Qty: 30 TABLET | Refills: 3 | Status: SHIPPED | OUTPATIENT
Start: 2024-02-26 | End: 2024-04-04

## 2024-02-26 RX ORDER — MEGESTROL ACETATE 40 MG/1
40 TABLET ORAL DAILY
Qty: 30 TABLET | Refills: 3 | Status: SHIPPED | OUTPATIENT
Start: 2024-02-26 | End: 2024-05-16 | Stop reason: SDUPTHER

## 2024-02-26 NOTE — TELEPHONE ENCOUNTER
Briefly spoke with patient attempting to conduct a follow up phone call.  Patient requested a call back tomorrow.

## 2024-02-26 NOTE — TELEPHONE ENCOUNTER
From the patient's office note:Decrease gabapentin to once at night, hold elavil       ----- Message from Sujey Parker sent at 2/26/2024 12:50 PM CST -----  Patient is calling in regards to medication usage please call her back at [phone].        Thanks  [facility]

## 2024-02-26 NOTE — PROGRESS NOTES
Subjective:      Patient ID: Diana Prather is a 58 y.o. female.    Chief Complaint: Follow-up    HPI    Past Medical History:   Diagnosis Date    Allergy     Esophagitis     Fatty liver 08/29/2022    Diagnosed by Ultrasound which was order by Dr. Castillo.    GERD (gastroesophageal reflux disease)     Hiatal hernia     Schatzki's ring of distal esophagus     Small cell lung cancer 11/15/2021       Patient is here for follow up. She has a h/o small cell lung cancer incidentally detected on CT lung cancer screening      COPD diagnosed due to chronic smoking history and is under the care of Pulmonology Sim R[facility] and is on symbicort with albuterol rescue inhaler     She has a h/o  long standing nausea and her appetite is not what it used to be. She is on protonix. She continues to lose weight     Her gastroenterologist is Dr Birmingham and she has had several testing done  Abdominal US done showed fatty liver and contracted GB, HIDA scan was normal. CT is august showed fatty liver and some mild diverticulitis. She has had EGD and colonoscopy done by Dr Birmingham. Hasn't seen him in some time     She is on Reglan and compazine but still losing weight, now she states she doesn't have an appetite. Has some tremors likely due to the reglan      She is also taking marijuana but it is not helping with her appetite          Review of Systems   Constitutional:  Positive for malaise/fatigue and weight loss. Negative for chills and fever.   HENT:  Negative for hearing loss.    Eyes:  Negative for blurred vision.   Respiratory:  Positive for cough. Negative for shortness of breath and wheezing.         Smoking, also has noted left pleural effusion   Cardiovascular:  Negative for chest pain, palpitations and leg swelling.   Gastrointestinal:  Negative for abdominal pain, blood in stool, constipation, diarrhea, melena, nausea and vomiting.   Genitourinary:  Negative for dysuria, frequency and urgency.   Musculoskeletal:  Negative  "for falls.   Skin:  Negative for rash.   Neurological:  Positive for dizziness. Negative for headaches.   Endo/Heme/Allergies:  Does not bruise/bleed easily.   Psychiatric/Behavioral:  Negative for depression. The patient is not nervous/anxious.      Objective:     Physical Exam  Vitals reviewed.   Constitutional:       Appearance: Normal appearance. She is ill-appearing.      Comments: thin   HENT:      Head: Normocephalic and atraumatic.      Mouth/Throat:      Mouth: Mucous membranes are moist.      Pharynx: Oropharynx is clear.   Eyes:      Extraocular Movements: Extraocular movements intact.      Conjunctiva/sclera: Conjunctivae normal.      Pupils: Pupils are equal, round, and reactive to light.   Cardiovascular:      Rate and Rhythm: Normal rate and regular rhythm.   Pulmonary:      Effort: Pulmonary effort is normal.      Breath sounds: Normal breath sounds.      Comments: Decreased breath sounds in left lower lobe  Abdominal:      General: Bowel sounds are normal.   Musculoskeletal:      Right lower leg: No edema.      Left lower leg: No edema.   Skin:     General: Skin is warm.      Capillary Refill: Capillary refill takes less than 2 seconds.   Neurological:      Mental Status: She is alert and oriented to person, place, and time.   Psychiatric:         Mood and Affect: Mood normal.       /88 (BP Location: Right arm, Patient Position: Sitting, BP Method: Medium (Automatic))   Pulse 108   Ht 5' 6" (1.676 m)   Wt 50.3 kg (111 lb)   LMP  (LMP Unknown)   SpO2 96%   BMI 17.92 kg/m²     Assessment:       ICD-10-CM ICD-9-CM   1. Encounter for screening for malignant neoplasm of breast, unspecified screening modality  Z12.39 V76.10   2. Chronic obstructive pulmonary disease, unspecified COPD type  J44.9 496   3. Small cell lung cancer, left  C34.92 162.9   4. Weight loss  R63.4 783.21   5. Encounter for screening mammogram for malignant neoplasm of breast  Z12.31 V76.12       Plan:     Medication List " with Changes/Refills   New Medications    MEGESTROL (MEGACE) 40 MG TAB    Take 1 tablet (40 mg total) by mouth once daily.   Current Medications    ALBUTEROL (PROVENTIL/VENTOLIN HFA) 90 MCG/ACTUATION INHALER    INHALE 2 PUFFS INTO THE LUNGS EVERY 6 HOURS AS NEEDED FOR WHEEZING, RESCUE    ALBUTEROL-IPRATROPIUM (DUO-NEB) 2.5 MG-0.5 MG/3 ML NEBULIZER SOLUTION    Take 3 mLs by nebulization every 6 (six) hours as needed for Wheezing. Rescue    AMITRIPTYLINE (ELAVIL) 25 MG TABLET    TAKE 1 TABLET BY MOUTH EVERY NIGHT AT BEDTIME AS NEEDED    BUDESONIDE-FORMOTEROL 160-4.5 MCG (SYMBICORT) 160-4.5 MCG/ACTUATION HFAA    Inhale 2 puffs into the lungs every 12 (twelve) hours. Controller    CETIRIZINE (ZYRTEC) 10 MG TABLET    Take 1 tablet (10 mg total) by mouth once daily.    FAMOTIDINE (PEPCID) 20 MG TABLET    Take 1 tablet (20 mg total) by mouth 2 (two) times daily.    FLUTICASONE PROPIONATE (FLONASE) 50 MCG/ACTUATION NASAL SPRAY    1 spray (50 mcg total) by Each Nostril route once daily.    GABAPENTIN (NEURONTIN) 400 MG CAPSULE    Take 1 capsule (400 mg total) by mouth 3 (three) times daily.    HYDROCODONE-ACETAMINOPHEN (NORCO) 5-325 MG PER TABLET        LIDOCAINE VISCOUS 2 % SOLUTION        METOCLOPRAMIDE HCL (REGLAN) 5 MG TABLET    Take 1 tablet (5 mg total) by mouth 4 (four) times daily before meals and nightly.    PANTOPRAZOLE (PROTONIX) 40 MG TABLET    Take 1 tablet (40 mg total) by mouth once daily.    PROCHLORPERAZINE (COMPAZINE) 10 MG TABLET    Take 1 tablet (10 mg total) by mouth 3 (three) times daily.    SERTRALINE (ZOLOFT) 50 MG TABLET    TAKE 1 TABLET BY MOUTH EVERY DAY    TIOTROPIUM BROMIDE (SPIRIVA RESPIMAT) 2.5 MCG/ACTUATION INHALER    INHALE 2 PUFFS BY MOUTH EVERY DAY    VARENICLINE (CHANTIX) 0.5 MG TAB    Take 1 tablet by mouth once daily for 3 days, then take 1 tablet by mouth twice daily for 4 days (Take this bottle first)    VARENICLINE (CHANTIX) 1 MG TAB    Take 1 tablet (1 mg total) by mouth 2 (two)  times daily.        1. Encounter for screening for malignant neoplasm of breast, unspecified screening modality  -     Mammo Digital Screening Bilat; Future; Expected date: 02/26/2024    2. Chronic obstructive pulmonary disease, unspecified COPD type    3. Small cell lung cancer, left    4. Weight loss  -     megestroL (MEGACE) 40 MG Tab; Take 1 tablet (40 mg total) by mouth once daily.  Dispense: 30 tablet; Refill: 3    5. Encounter for screening mammogram for malignant neoplasm of breast  -     Mammo Digital Screening Bilat; Future; Expected date: 02/26/2024       On chantix to stop smoking    Decrease gabapentin to once at night, hold elavil    Flu shot today, advised to get the RSV vaccine       Future Appointments   Date Time Provider Department Center   3/4/2024 10:00 AM Krystin Gomez NP [facility]  [place]   3/15/2024 10:00 AM [facility] [address]

## 2024-02-27 ENCOUNTER — TELEPHONE (OUTPATIENT)
Dept: SMOKING CESSATION | Facility: CLINIC | Age: 59
End: 2024-02-27
Payer: MEDICARE

## 2024-02-27 NOTE — TELEPHONE ENCOUNTER
Attempted to contact patient to conduct a follow up phone call.  Patient did not answer.  Counselor left a voice message with name and contact information.

## 2024-02-28 ENCOUNTER — TELEPHONE (OUTPATIENT)
Dept: SMOKING CESSATION | Facility: CLINIC | Age: 59
End: 2024-02-28
Payer: MEDICARE

## 2024-02-28 NOTE — TELEPHONE ENCOUNTER
Spoke with patient regarding smoking cessation progress.  Patient stated she experienced a setback and is currently smoking 3/4 of a pack of cigarettes daily.  Counselor suggested that patient set daily smoking limits by limiting the number of cigarettes she has access to daily.  Patient set goal at 12 cigarettes daily.  Patient appeared to be receptive to the information given.  Counselor scheduled follow up appointment for 3/5/2024 @ 4pm.

## 2024-03-04 ENCOUNTER — OFFICE VISIT (OUTPATIENT)
Dept: PULMONOLOGY | Facility: CLINIC | Age: 59
End: 2024-03-04
Payer: MEDICARE

## 2024-03-04 ENCOUNTER — HOSPITAL ENCOUNTER (OUTPATIENT)
Dept: RADIOLOGY | Facility: CLINIC | Age: 59
Discharge: HOME | End: 2024-03-04
Payer: MEDICARE

## 2024-03-04 VITALS
HEIGHT: 66 IN | WEIGHT: 110 LBS | RESPIRATION RATE: 18 BRPM | SYSTOLIC BLOOD PRESSURE: 150 MMHG | BODY MASS INDEX: 17.68 KG/M2 | OXYGEN SATURATION: 93 % | DIASTOLIC BLOOD PRESSURE: 91 MMHG | HEART RATE: 93 BPM

## 2024-03-04 DIAGNOSIS — J90 PLEURAL EFFUSION ON LEFT: ICD-10-CM

## 2024-03-04 DIAGNOSIS — R91.8 MASS OF LEFT LUNG: ICD-10-CM

## 2024-03-04 DIAGNOSIS — C34.90 SMALL CELL LUNG CANCER: ICD-10-CM

## 2024-03-04 DIAGNOSIS — F17.200 SMOKING ADDICTION: ICD-10-CM

## 2024-03-04 DIAGNOSIS — J44.9 CHRONIC OBSTRUCTIVE PULMONARY DISEASE, UNSPECIFIED COPD TYPE: Primary | ICD-10-CM

## 2024-03-04 DIAGNOSIS — J44.9 CHRONIC OBSTRUCTIVE PULMONARY DISEASE, UNSPECIFIED COPD TYPE: ICD-10-CM

## 2024-03-04 PROCEDURE — 1159F MED LIST DOCD IN RCRD: CPT | Mod: CPTII,S$GLB,,

## 2024-03-04 PROCEDURE — 3008F BODY MASS INDEX DOCD: CPT | Mod: CPTII,S$GLB,,

## 2024-03-04 PROCEDURE — 71046 X-RAY EXAM CHEST 2 VIEWS: CPT | Mod: 26,,, | Performed by: RADIOLOGY

## 2024-03-04 PROCEDURE — 99215 OFFICE O/P EST HI 40 MIN: CPT | Mod: S$GLB,,,

## 2024-03-04 PROCEDURE — 3080F DIAST BP >= 90 MM HG: CPT | Mod: CPTII,S$GLB,,

## 2024-03-04 PROCEDURE — 3077F SYST BP >= 140 MM HG: CPT | Mod: CPTII,S$GLB,,

## 2024-03-04 NOTE — ASSESSMENT & PLAN NOTE
Symptoms of progressive shortness of breath may be due to fluid collection in the left pleural space.  We will get a chest x-ray today to observe the nature of the effusion at present.  +/-thoracentesis given her symptoms.  We will follow up results of chest x-ray and decide on further plan of care.

## 2024-03-04 NOTE — ASSESSMENT & PLAN NOTE
Discontinue Symbicort and Spiriva and restart Trelegy.  Patient reports better symptom control with Trelegy.  She was provided 1 sample from our clinic today and I submitted an order to her pharmacy.    Continue with albuterol inhaler as needed.

## 2024-03-04 NOTE — PROGRESS NOTES
Subjective:    Patient Identification:  Patient ID: Diana Prather is a 58 y.o. female.    Referring Provider:  No ref. provider found     Chief Complaint:  No chief complaint on file.      History of Present Illness:    Diana Prather is a 58 y.o. female who presents for a COPD follow-up.  She states that she had to just use her rescue inhaler because she has been short of breath.  She is currently using Symbicort and Spiriva for maintenance therapy.  She says she has to use her rescue inhaler at least once a day.  Denies any increased and sputum production or change in chronic cough.  Denies any fever chills.  She says that she has been persistently short of breath for the last several months, particularly since she was switched from Trelegy to Symbicort and Spiriva.  Of note she had a CT scan done January of this year which showed a moderate-size left pleural effusion this seems to be gradually increasing in size with each exam.  She is currently enrolled in the smoking cessation program and has cut down on her smoking to 12 cigarettes a day.    Review of Systems:  Review of Systems   Constitutional:  Negative for fever, chills, appetite change, night sweats and weakness.   HENT:  Negative for postnasal drip, rhinorrhea, sore throat, trouble swallowing, voice change, congestion and ear pain.    Respiratory:  Positive for cough, sputum production, shortness of breath, dyspnea on extertion and use of rescue inhaler. Negative for hemoptysis, choking, chest tightness, wheezing and pleurisy.    Cardiovascular:  Negative for chest pain, palpitations and leg swelling.   Genitourinary:  Negative for difficulty urinating and hematuria.   Endocrine:  Negative for polydipsia, polyphagia, cold intolerance, heat intolerance and polyuria.    Musculoskeletal:  Negative for joint swelling and myalgias.   Skin:  Negative for rash.   Gastrointestinal:  Negative for nausea, vomiting, abdominal pain and abdominal distention.  "  Neurological:  Negative for dizziness, syncope, light-headedness and headaches.   Psychiatric/Behavioral:  Negative for confusion and sleep disturbance. The patient is not nervous/anxious.        Allergies:   Review of patient's allergies indicates:   Allergen Reactions    Pyridium [phenazopyridine] Hives       Medications:      Past Medical History:      Past Medical History:   Diagnosis Date    Allergy     Esophagitis     Fatty liver 08/29/2022    Diagnosed by Ultrasound which was order by Dr. Castillo.    GERD (gastroesophageal reflux disease)     Hiatal hernia     Schatzki's ring of distal esophagus     Small cell lung cancer 11/15/2021       Family History:      Family History   Problem Relation Age of Onset    Emphysema Mother     Diabetes Mother     Diabetes Father     Alcohol abuse Father     No Known Problems Sister     No Known Problems Brother     No Known Problems Maternal Aunt     No Known Problems Maternal Uncle     No Known Problems Paternal Aunt     No Known Problems Paternal Uncle     No Known Problems Maternal Grandmother     No Known Problems Maternal Grandfather     No Known Problems Paternal Grandmother     No Known Problems Paternal Grandfather     No Known Problems Daughter         Social History:      Past Surgical History:   Procedure Laterality Date    COLONOSCOPY       in [place] and he did the dialation     COLONOSCOPY  08/17/2022    ESOPHAGEAL DILATION      ESOPHAGOGASTRODUODENOSCOPY  08/10/2022    SPINAL FUSION      neck       Physical Exam:  BP (!) 150/91 (BP Location: Right arm, Patient Position: Sitting, BP Method: Medium (Automatic))   Pulse 93   Resp 18   Ht 5' 6" (1.676 m)   Wt 49.9 kg (110 lb)   LMP  (LMP Unknown)   SpO2 (!) 93%   BMI 17.75 kg/m²   Physical Exam   Constitutional: She is oriented to person, place, and time. She appears not cachectic. No distress.   HENT:   Head: Normocephalic.   Right Ear: External ear normal.   Left Ear: External ear normal. " "  Nose: Nose normal. No mucosal edema. No polyps.   Mouth/Throat: Oropharynx is clear and moist. Normal dentition. No oropharyngeal exudate. Mallampati Score: III.   Neck: No JVD present. No tracheal deviation present. No thyromegaly present.   Cardiovascular: Normal rate, regular rhythm, normal heart sounds and intact distal pulses. Exam reveals no gallop and no friction rub.   No murmur heard.  Pulmonary/Chest: Normal expansion, symmetric chest wall expansion, effort normal and breath sounds normal. No stridor. No respiratory distress. She has no decreased breath sounds. She has no wheezes. She has no rhonchi. She has no rales. Chest wall is not dull to percussion. She exhibits no tenderness.   Decreased LLL Negative for egophony. Negative for tactile fremitus.   Abdominal: Soft. Bowel sounds are normal. She exhibits no distension and no mass. There is no hepatosplenomegaly. There is no abdominal tenderness. There is no rebound and no guarding. No hernia.   Musculoskeletal:         General: No tenderness, deformity or edema. Normal range of motion.      Cervical back: Normal range of motion and neck supple.   Lymphadenopathy: No supraclavicular adenopathy is present.     She has no cervical adenopathy.     She has no axillary adenopathy.   Neurological: She is alert and oriented to person, place, and time. She has normal reflexes. She displays normal reflexes. No cranial nerve deficit. She exhibits normal muscle tone.   Skin: Skin is warm and dry. No rash noted. She is not diaphoretic. No cyanosis or erythema. No pallor. Nails show no clubbing.   Psychiatric: She has a normal mood and affect. Her behavior is normal. Judgment and thought content normal.           No results found for: "PREFEV1", "HQK5CDHWJY", "PREFVC", "FVCPREREF", "YAQQNG2FEG", "RGC3FYPREZI", "NGER6MXS", "SXYU8PSD", "PREDLCO", "DLCOSBPRRF", "DLCOADJPRE", "DLCOCSBRPRRF", "POSTFEV1", "POSTFVC", "RAHTPAA8CCS"   X-Ray Chest PA And Lateral  Narrative: " EXAM:  XR CHEST PA AND LATERAL    CLINICAL HISTORY: [J44.9]-Chronic obstructive pulmonary disease, unspecified.    COMPARISON: None    FINDINGS: This examination consists of frontal and lateral views of the chest.  The size of the heart is normal.  There is a mild amount of haziness in the base of the left lung.  The right lung is clear.  There is a moderate size left pleural effusion.  There is no pneumothorax.  Impression: 1.  There is a mild amount of haziness in the base of the left lung.  An infectious process cannot be excluded.  2.  There is a moderate sized left pleural effusion.    Finalized on: 3/4/2024 10:10 AM By:  Dieter Marquis MD  [facility]# [id number]      2024-03-04 10:12:57.805    [facility]           Accessory Clinical Data:  Chest x-ray:    CT scan:     PFTs:     6MWT:     TTE:    Lab data:    All radiographic imaging of the chest, PFT tracings/data, and 6MWT data have been independently reviewed and interpreted unless otherwise specified.     Assessment and Plan:      Problem List Items Addressed This Visit          Pulmonary    Mass of left lung    Chronic obstructive pulmonary disease - Primary    Current Assessment & Plan     Discontinue Symbicort and Spiriva and restart Trelegy.  Patient reports better symptom control with Trelegy.  She was provided 1 sample from our clinic today and I submitted an order to her pharmacy.    Continue with albuterol inhaler as needed.         Relevant Medications    fluticasone-umeclidin-vilanter (TRELEGY ELLIPTA) 100-62.5-25 mcg DsDv    Other Relevant Orders    X-Ray Chest PA And Lateral (Completed)    Pleural effusion on left    Current Assessment & Plan     Symptoms of progressive shortness of breath may be due to fluid collection in the left pleural space.  We will get a chest x-ray today to observe the nature of the effusion at present.  +/-thoracentesis given her symptoms.  We will follow up results of chest x-ray and decide on further plan of care.             Oncology    Small cell lung cancer       Other    Smoking addiction    Current Assessment & Plan     Using Chantix.  Cut down to 12 cigarettes per day.           Orders Placed This Encounter   Procedures    X-Ray Chest PA And Lateral     Standing Status:   Future     Number of Occurrences:   1     Standing Expiration Date:   3/4/2025     Order Specific Question:   May the Radiologist modify the order per protocol to meet the clinical needs of the patient?     Answer:   Yes     Order Specific Question:   Release to patient     Answer:   Immediate          High complexity case.  60 min were spent in reviewing the important data including imaging studies on a different EMR, laboratory data, notes from other consultants and reviewing care with other providers with more than 50% of the time spent face-to-face on counseling, explaining the disease process, progression, importance of compliance with prescribed medications and the importance of follow-up.  Follow up in about 1 month (around 4/4/2024) for Pleural effusion in symptom evaluation after med change.         ADDENDUM:  CXR reviewed showing moderate left pleural effusion with increased opacification in the base. She will ne IR thoracentesis of the left side effusion.   Pleural fluid will be sent for appropriate analysis studies along with cultures, and cytology.  Serum total protein and LDH will also be ordered.         ADDENDUM:  S/p left thoracentesis on 3/11/2024. Drainage of 150cc of serosanguinous pleural fluid. Fluid analysis meets criteria for exudative effusion. Cultures and cytology pending.

## 2024-03-05 ENCOUNTER — CLINICAL SUPPORT (OUTPATIENT)
Dept: SMOKING CESSATION | Facility: CLINIC | Age: 59
End: 2024-03-05

## 2024-03-05 ENCOUNTER — PATIENT MESSAGE (OUTPATIENT)
Dept: PRIMARY CARE CLINIC | Facility: CLINIC | Age: 59
End: 2024-03-05
Payer: MEDICARE

## 2024-03-05 DIAGNOSIS — F17.200 NICOTINE DEPENDENCE: Primary | ICD-10-CM

## 2024-03-05 DIAGNOSIS — J90 PLEURAL EFFUSION ON LEFT: Primary | ICD-10-CM

## 2024-03-05 NOTE — PROGRESS NOTES
Individual Follow-Up Form    3/5/2024    Quit Date: TBD    Clinical Status of Patient: Outpatient    Length of Service: 30 minutes    Continuing Medication: yes  Chantix     Target Symptoms: Withdrawal and medication side effects. The following were  rated moderate (3) to severe (4) on TCRS:  Moderate (3): None Reported  Severe (4): None Reported    Comments: Spoke with patient at length via phone regarding tobacco cessation progress. Patient remains on prescribed tobacco cessation medication 1mg Chantix without any negative side effects at this time.  Patient decreased to 9 cigarettes one day but fluctuated on other days last week.   Patient will cut down to 9 cigarettes a day this week.  Reviewed strategies, controlling environment, cues, triggers, new goals set. Introduced high risk situations with preparation interventions, caffeine similarities with withdrawal issues of habit and nicotine, Alcohol, Understanding urges, cravings, stress and relaxation. Open discussion with intervention discussion.  Patient explained the similarities with nicotine and caffeine and the importance of limiting her caffeine intake.  Counselor suggested that patient set daily smoking limits by limiting the number of cigarettes she has access to daily.  Counselor discussed the 4ds (delay, drink, distract, deep breathing) to address nicotine cravings.  Patient appeared to be receptive to the information given.  Counselor will continue to motivate patient to be tobacco free.    Diagnosis: F17.200    Next Visit: 1 week

## 2024-03-06 ENCOUNTER — PATIENT MESSAGE (OUTPATIENT)
Dept: PRIMARY CARE CLINIC | Facility: CLINIC | Age: 59
End: 2024-03-06
Payer: MEDICARE

## 2024-03-06 ENCOUNTER — TELEPHONE (OUTPATIENT)
Dept: PRIMARY CARE CLINIC | Facility: CLINIC | Age: 59
End: 2024-03-06
Payer: MEDICARE

## 2024-03-06 DIAGNOSIS — R25.1 OCCASIONAL TREMORS: Primary | ICD-10-CM

## 2024-03-06 DIAGNOSIS — R11.2 NAUSEA AND VOMITING, UNSPECIFIED VOMITING TYPE: ICD-10-CM

## 2024-03-06 RX ORDER — LORAZEPAM 0.5 MG/1
0.5 TABLET ORAL EVERY 8 HOURS PRN
Qty: 30 TABLET | Refills: 0 | Status: SHIPPED | OUTPATIENT
Start: 2024-03-06 | End: 2024-04-04

## 2024-03-06 RX ORDER — FAMOTIDINE 20 MG/1
20 TABLET, FILM COATED ORAL 2 TIMES DAILY
Qty: 60 TABLET | Refills: 10 | Status: SHIPPED | OUTPATIENT
Start: 2024-03-06

## 2024-03-06 NOTE — TELEPHONE ENCOUNTER
"The patient states you removed  Reglan due to tremors. She says she now has "twitching to my  hands, feet, mouth and weakness in my legs. Please advise.     ----- Message from Yue Mac sent at 3/6/2024  8:19 AM CST -----  Contact: self  Patient Diana Prather is requesting a call back from the nurse in regards to a call she placed yesterday regarding twitching she's experiencing. She states she did not get a call back. Did advise patient it looked like the staff reached out to her on the portal. She states she didn't check it, but is still asking for a call back. Please call back at [phone]      "

## 2024-03-07 ENCOUNTER — TELEPHONE (OUTPATIENT)
Dept: PULMONOLOGY | Facility: CLINIC | Age: 59
End: 2024-03-07
Payer: MEDICARE

## 2024-03-07 NOTE — TELEPHONE ENCOUNTER
----- Message from Judith Austin sent at 3/7/2024 10:29 AM CST -----  Contact: Diana Fofana is calling to receive a call back at .[phone], regarding procedure questions. Reports wanting to know how much after insurance she's required to pay for Monday's procedure

## 2024-03-08 ENCOUNTER — TELEPHONE (OUTPATIENT)
Dept: PRIMARY CARE CLINIC | Facility: CLINIC | Age: 59
End: 2024-03-08
Payer: MEDICARE

## 2024-03-08 DIAGNOSIS — G24.01 TARDIVE DYSKINESIA: Primary | ICD-10-CM

## 2024-03-08 NOTE — TELEPHONE ENCOUNTER
----- Message from Kim Baez sent at 3/8/2024 10:51 AM CST -----  Contact: self  Patient is requesting a call back regarding medication LORazepam (ATIVAN) 0.5 MG tablet - making her feel light headed and is not working. Please call back at [phone]

## 2024-03-11 ENCOUNTER — OUTSIDE PLACE OF SERVICE (OUTPATIENT)
Dept: INTERVENTIONAL RADIOLOGY/VASCULAR | Facility: CLINIC | Age: 59
End: 2024-03-11
Payer: MEDICARE

## 2024-03-11 LAB
BASO, FLUID: 0 %
BODY FLUID SOURCE: ABNORMAL
CBC W AUTO DIFF BLD: ABNORMAL
CELLS COUNTED, FLUID: 76
COLOR, FLUID: ABNORMAL
EOSINOPHIL NFR FLD MANUAL: 1 %
LDH SERPL L TO P-CCNC: 272 U/L (ref 84–246)
LDH, BODY FLUID: 344 U/L
LYMPHOCYTES, FLUID: 68 %
NEUTROPHILS BODY FLUID: 13 %
OTHER CELLS % (MANUAL DIFF): 0 %
OTHER CELLS, FLUID: 0 /UL
PROT SERPL-MCNC: 7.5 G/DL (ref 6.4–8.2)
PROTEIN, BODY FLUID: 4.1 G/DL
RBC # FLD AUTO: ABNORMAL /UL
TEMAFLOXACIN ISLT MLC: 17 %
WBC, BODY FLUID: 25 /UL

## 2024-03-11 PROCEDURE — 32555 ASPIRATE PLEURA W/ IMAGING: CPT | Mod: LT,,, | Performed by: RADIOLOGY

## 2024-03-12 ENCOUNTER — CLINICAL SUPPORT (OUTPATIENT)
Dept: SMOKING CESSATION | Facility: CLINIC | Age: 59
End: 2024-03-12

## 2024-03-12 DIAGNOSIS — F17.200 NICOTINE DEPENDENCE: Primary | ICD-10-CM

## 2024-03-12 RX ORDER — VARENICLINE TARTRATE 1 MG/1
1 TABLET, FILM COATED ORAL 2 TIMES DAILY
Qty: 56 TABLET | Refills: 1 | Status: SHIPPED | OUTPATIENT
Start: 2024-03-12

## 2024-03-12 NOTE — PROGRESS NOTES
Individual Follow-Up Form    3/12/2024    Quit Date: TBD    Clinical Status of Patient: Outpatient    Length of Service: 30 minutes    Continuing Medication: yes  Chantix     Target Symptoms: Withdrawal and medication side effects. The following were  rated moderate (3) to severe (4) on TCRS:  Moderate (3): None Reported  Severe (4): None Reported    Comments: Spoke with patient at length via phone regarding tobacco cessation progress. Patient remains on prescribed tobacco cessation medication 1mg Chantix without any negative side effects at this time.  Patient remains smoking 9 cigarettes per day.  Patient will focus on maintaining 9 cigarettes a day this week.  Reviewed strategies, habitual behavior, high risks situations, understanding urges and cravings, stress and relaxation with open discussion and additional interventions, Introduced lapses, relapses, understanding them and analyzing the situation of a lapse, conflict issues that may be linked to a lapse.  Patient expressed that her stress levels have been elevated.  Counselor discussed healthy coping skills for patient to utilize.  Counselor discussed the 4ds (delay, drink, distract, deep breathing) to address nicotine cravings.  Counselor suggested that patient set daily smoking limits by limiting the number of cigarettes she has access to daily.  Counselor submitted a refill for 1mg Chantix.  Patient appeared to be receptive to the information given.  Counselor will continue to motivate patient to be tobacco free.    Diagnosis: F17.200    Next Visit: 1 week

## 2024-03-16 LAB
CULTURE, BODY FLUID, AEROBIC: NORMAL
CULTURE, BODY FLUID, ANAEROBIC: NORMAL
GRAM STAIN, BODY FLUID: NORMAL

## 2024-03-20 ENCOUNTER — TELEPHONE (OUTPATIENT)
Dept: SMOKING CESSATION | Facility: CLINIC | Age: 59
End: 2024-03-20
Payer: MEDICARE

## 2024-03-20 NOTE — TELEPHONE ENCOUNTER
Spoke with patient regarding her smoking cessation progress and scheduled follow up appointment.  Patient states she plans to put 9 cigarettes in her pack today and focus on completing that goal.  Patient also states she is experiencing some life stressors that are impacting her quit attempt.  Counselor suggested coping skills such as deep breathing to help lower stress and deter her from smoking.  Counselor scheduled future follow up appointment.  Appointment scheduled for 3/25/2024 @ 4pm.

## 2024-03-25 ENCOUNTER — CLINICAL SUPPORT (OUTPATIENT)
Dept: SMOKING CESSATION | Facility: CLINIC | Age: 59
End: 2024-03-25

## 2024-03-25 DIAGNOSIS — F17.200 NICOTINE DEPENDENCE: Primary | ICD-10-CM

## 2024-03-25 DIAGNOSIS — J44.9 CHRONIC OBSTRUCTIVE PULMONARY DISEASE, UNSPECIFIED COPD TYPE: ICD-10-CM

## 2024-03-25 RX ORDER — ALBUTEROL SULFATE 90 UG/1
AEROSOL, METERED RESPIRATORY (INHALATION)
Qty: 8.5 G | Refills: 1 | Status: SHIPPED | OUTPATIENT
Start: 2024-03-25 | End: 2024-05-16 | Stop reason: SDUPTHER

## 2024-03-25 NOTE — PROGRESS NOTES
Individual Follow-Up Form    3/25/2024    Quit Date: TBD    Clinical Status of Patient: Outpatient    Length of Service: 30 minutes    Continuing Medication: yes  Chantix     Target Symptoms: Withdrawal and medication side effects. The following were  rated moderate (3) to severe (4) on TCRS:  Moderate (3): None Reported  Severe (4): None Reported    Comments: Spoke with patient at length via phone regarding tobacco cessation progress. Patient remains on prescribed tobacco cessation medication 1mg Chantix without any negative side effects at this time.  Patient increased to 17-18 cigarettes daily.  Patient will cut down to 16 cigarettes a day this week.  Reviewed strategies, habitual behavior, high risks situations, understanding urges and cravings, stress and relaxation with open discussion and additional interventions, Introduced lapses, relapses, understanding them and analyzing the situation of a lapse, conflict issues that may be linked to a lapse.  Patient expressed that increase levels of stress has created a setback.  Counselor discussed techniques and methods to help decrease stress including deep breathing, journaling, listening to music, and walking.  Patient also states she is using mints to help address cravings.  Patient appeared to be receptive to the information given.  Counselor will continue to motivate patient to be tobacco free.    Diagnosis: F17.200    Next Visit: 1 week

## 2024-04-04 ENCOUNTER — HOSPITAL ENCOUNTER (OUTPATIENT)
Dept: RADIOLOGY | Facility: CLINIC | Age: 59
Discharge: HOME | End: 2024-04-04
Payer: MEDICARE

## 2024-04-04 ENCOUNTER — OFFICE VISIT (OUTPATIENT)
Dept: PULMONOLOGY | Facility: CLINIC | Age: 59
End: 2024-04-04
Payer: MEDICARE

## 2024-04-04 ENCOUNTER — CLINICAL SUPPORT (OUTPATIENT)
Dept: PULMONOLOGY | Facility: CLINIC | Age: 59
End: 2024-04-04
Payer: MEDICARE

## 2024-04-04 VITALS
SYSTOLIC BLOOD PRESSURE: 140 MMHG | HEIGHT: 66 IN | DIASTOLIC BLOOD PRESSURE: 71 MMHG | HEART RATE: 101 BPM | OXYGEN SATURATION: 92 % | RESPIRATION RATE: 17 BRPM | BODY MASS INDEX: 17.71 KG/M2 | WEIGHT: 110.19 LBS

## 2024-04-04 VITALS — WEIGHT: 110.25 LBS | BODY MASS INDEX: 17.72 KG/M2 | HEIGHT: 66 IN

## 2024-04-04 DIAGNOSIS — J44.9 CHRONIC OBSTRUCTIVE PULMONARY DISEASE, UNSPECIFIED COPD TYPE: Primary | ICD-10-CM

## 2024-04-04 DIAGNOSIS — J44.9 CHRONIC OBSTRUCTIVE PULMONARY DISEASE, UNSPECIFIED COPD TYPE: ICD-10-CM

## 2024-04-04 DIAGNOSIS — J90 PLEURAL EFFUSION ON LEFT: ICD-10-CM

## 2024-04-04 DIAGNOSIS — J90 PLEURAL EFFUSION ON LEFT: Primary | ICD-10-CM

## 2024-04-04 PROCEDURE — 71046 X-RAY EXAM CHEST 2 VIEWS: CPT | Mod: 26,,, | Performed by: RADIOLOGY

## 2024-04-04 PROCEDURE — 1159F MED LIST DOCD IN RCRD: CPT | Mod: CPTII,S$GLB,,

## 2024-04-04 PROCEDURE — 3078F DIAST BP <80 MM HG: CPT | Mod: CPTII,S$GLB,,

## 2024-04-04 PROCEDURE — 94618 PULMONARY STRESS TESTING: CPT | Mod: S$GLB,,, | Performed by: INTERNAL MEDICINE

## 2024-04-04 PROCEDURE — 3008F BODY MASS INDEX DOCD: CPT | Mod: CPTII,S$GLB,,

## 2024-04-04 PROCEDURE — 3077F SYST BP >= 140 MM HG: CPT | Mod: CPTII,S$GLB,,

## 2024-04-04 PROCEDURE — 99215 OFFICE O/P EST HI 40 MIN: CPT | Mod: 25,S$GLB,,

## 2024-04-04 RX ORDER — IPRATROPIUM BROMIDE AND ALBUTEROL SULFATE 2.5; .5 MG/3ML; MG/3ML
3 SOLUTION RESPIRATORY (INHALATION) EVERY 6 HOURS PRN
Qty: 75 ML | Refills: 0 | Status: SHIPPED | OUTPATIENT
Start: 2024-04-04 | End: 2025-04-04

## 2024-04-04 NOTE — ASSESSMENT & PLAN NOTE
Still having significant shortness of breath on exertion.  We will repeat chest x-ray to make sure pleural fluid has not reaccumulated.  Consider referral to Cardiology.

## 2024-04-04 NOTE — PROCEDURES
"[facility] - Pulmonary Function  Six Minute Walk     SUMMARY     Ordering Provider: Krystin Gomez NP   Interpreting Provider: Zuly Gonzalez MD  Performing nurse/tech/RT: CURTIS Leos RRT  Diagnosis: Shortness of Breath  Height: 5' 6" (167.6 cm)  Weight: 50 kg (110 lb 3.7 oz)  BMI (Calculated): 17.8   Patient Race:             Phase Oxygen Assessment Supplemental O2 Heart   Rate Blood Pressure Dennis Dyspnea Scale Rating   Resting 97 % Room Air 105 bpm 140/71 3   Exercise        Minute        1 97 % Room Air 119 bpm     2 95 % Room Air 125 bpm     3 98 % Room Air 126 bpm     4 98 % Room Air 128 bpm     5 99 % Room Air 121 bpm     6  97 % Room Air 122 bpm 105/71 4   Recovery        Minute        1 98 % Room Air 126 bpm     2 98 % Room Air 111 bpm     3 98 % Room Air 106 bpm     4 97 % Room Air 103 bpm 112/74 2     Six Minute Walk Summary  6MWT Status: completed with stops  Patient Reported: Dyspnea, Leg pain     Interpretation:  Did the patient stop or pause?: Yes  How many times did the patient stop or pause?: 2  Stop Time 1: 110  Restart Time 1: 130  Pause Time 1: 20 seconds  Stop Time 2: 240  Restart Time 2: 340  Pause Time 2: 100 seconds                    Total Time Walked (Calculated): 240 seconds  Final Partial Lap Distance (feet): 100 feet  Total Distance Meters (Calculated): 152.4 meters  Predicted Distance Meters (Calculated): 559.34 meters  Percentage of Predicted (Calculated): 27.25  Peak VO2 (Calculated): 8.55  Mets: 2.44  Has The Patient Had a Previous Six Minute Walk Test?: Yes       Previous 6MWT Results  Has The Patient Had a Previous Six Minute Walk Test?: Yes  Date of Previous Test: 01/12/23  Total Time Walked: 360 seconds  Total Distance (meters): 350.52  Predicted Distance (meters): 549.77 meters  Percentage of Predicted: 63.76  Percent Change (Calculated): 0.57    Physician interpretation:    No exertional hypoxia was noted during 6 minute walk.    Baseline heart rate was 105 and heart " rate yuridia to 122 beats per minute at the end of the exercise.    No significant rise in blood pressure was noted.    Dyspnea score based upon Dennis scale was 3 at baseline and yuridia to 4 at the end of the exercise.    Patient walked 152 m which is 27% of predicted distance in 6 minutes.    Patient complained of dyspnea and leg pain at the end of the exercise and completed the exercise with stops.

## 2024-04-04 NOTE — PROGRESS NOTES
Subjective:    Patient Identification:  Patient ID: Diana Prather is a 58 y.o. female.    Referring Provider:  No ref. provider found     Chief Complaint:  COPD      History of Present Illness:    Diana Prather is a 58 y.o. female who presents for follow-up of COPD and pleural effusion.  She was switched back to Trelegy last visit and states that it is working better than the Symbicort.  She uses her rescue albuterol inhaler 1 to 2 times a day.  Still has extreme shortness of breath on exertion.  She has a chronic productive cough which she states is better since starting the Trelegy.    Status post left thoracentesis.  Suggestive of exudative effusion.  Fluid cytology negative for malignant cells.  Fluid cultures were negative.  Denies any lower extremity edema.  Does not see a cardiologist.    Review of Systems:  Review of Systems   Constitutional:  Negative for fever, chills, appetite change, night sweats and weakness.   HENT:  Negative for postnasal drip, rhinorrhea, sore throat, trouble swallowing, voice change, congestion and ear pain.    Respiratory:  Positive for sputum production, shortness of breath, dyspnea on extertion and use of rescue inhaler. Negative for cough, hemoptysis, choking, chest tightness and wheezing.    Cardiovascular:  Negative for chest pain, palpitations and leg swelling.   Genitourinary:  Negative for difficulty urinating and hematuria.   Endocrine:  Negative for polydipsia, polyphagia, cold intolerance, heat intolerance and polyuria.    Musculoskeletal:  Negative for joint swelling and myalgias.   Skin:  Negative for rash.   Gastrointestinal:  Negative for nausea, vomiting, abdominal pain and abdominal distention.   Neurological:  Negative for dizziness, syncope, light-headedness and headaches.   Psychiatric/Behavioral:  Negative for confusion and sleep disturbance. The patient is not nervous/anxious.        Allergies:   Review of patient's allergies indicates:   Allergen  "Reactions    Pyridium [phenazopyridine] Hives       Medications:      Past Medical History:      Past Medical History:   Diagnosis Date    Allergy     Esophagitis     Fatty liver 08/29/2022    Diagnosed by Ultrasound which was order by Dr. Castillo.    GERD (gastroesophageal reflux disease)     Hiatal hernia     Schatzki's ring of distal esophagus     Small cell lung cancer 11/15/2021       Family History:      Family History   Problem Relation Age of Onset    Emphysema Mother     Diabetes Mother     Diabetes Father     Alcohol abuse Father     No Known Problems Sister     No Known Problems Brother     No Known Problems Maternal Aunt     No Known Problems Maternal Uncle     No Known Problems Paternal Aunt     No Known Problems Paternal Uncle     No Known Problems Maternal Grandmother     No Known Problems Maternal Grandfather     No Known Problems Paternal Grandmother     No Known Problems Paternal Grandfather     No Known Problems Daughter         Social History:      Past Surgical History:   Procedure Laterality Date    COLONOSCOPY       in [place] and he did the dialation     COLONOSCOPY  08/17/2022    ESOPHAGEAL DILATION      ESOPHAGOGASTRODUODENOSCOPY  08/10/2022    SPINAL FUSION      neck       Physical Exam:  BP (!) 140/71 (BP Location: Right arm, Patient Position: Sitting, BP Method: Medium (Automatic))   Pulse 101   Resp 17   Ht 5' 6" (1.676 m)   Wt 50 kg (110 lb 3.2 oz)   LMP  (LMP Unknown)   SpO2 (!) 92%   BMI 17.79 kg/m²   Physical Exam   Constitutional: She is oriented to person, place, and time. She appears not cachectic. No distress.   HENT:   Head: Normocephalic.   Right Ear: External ear normal.   Left Ear: External ear normal.   Nose: Nose normal. No mucosal edema. No polyps.   Mouth/Throat: Oropharynx is clear and moist. Normal dentition. No oropharyngeal exudate. Mallampati Score: III.   Neck: No JVD present. No tracheal deviation present. No thyromegaly present.   Cardiovascular: " "Normal rate, regular rhythm, normal heart sounds and intact distal pulses. Exam reveals no gallop and no friction rub.   No murmur heard.  Pulmonary/Chest: Normal expansion, symmetric chest wall expansion, effort normal and breath sounds normal. No stridor. No respiratory distress. She has no decreased breath sounds. She has no wheezes. She has no rhonchi. She has no rales. Chest wall is not dull to percussion. She exhibits no tenderness. Negative for egophony. Negative for tactile fremitus.   Abdominal: Soft. Bowel sounds are normal. She exhibits no distension and no mass. There is no hepatosplenomegaly. There is no abdominal tenderness. There is no rebound and no guarding. No hernia.   Musculoskeletal:         General: No tenderness, deformity or edema. Normal range of motion.      Cervical back: Normal range of motion and neck supple.   Lymphadenopathy: No supraclavicular adenopathy is present.     She has no cervical adenopathy.     She has no axillary adenopathy.   Neurological: She is alert and oriented to person, place, and time. She has normal reflexes. She displays normal reflexes. No cranial nerve deficit. She exhibits normal muscle tone.   Skin: Skin is warm and dry. No rash noted. She is not diaphoretic. No cyanosis or erythema. No pallor. Nails show no clubbing.   Psychiatric: She has a normal mood and affect. Her behavior is normal. Judgment and thought content normal.           No results found for: "PREFEV1", "EEF0OOSTMP", "PREFVC", "FVCPREREF", "VBFOBU3NAX", "JDB0KFWOEOW", "WPVV5PAY", "ISRH1CGH", "PREDLCO", "DLCOSBPRRF", "DLCOADJPRE", "DLCOCSBRPRRF", "POSTFEV1", "POSTFVC", "WPDYBAH6EXS"   [facility] UNKNOWN RAD EAP                                RADIOLOGY REPORT        PT NAME: PATRICIA BASHIR      [facility]     : 1965 F 58             [address] [facility]    ACCT: [id number]                                              [facility] Rec #: [id number]   "                                      [id number]    Patient Location: AL.RADUS/             Procedure: CHEST 1 VIEW    REQUISITION #: [id number]      REPORT #: [id number]           DATE OF EXAM: 24    TIME OF EXAM:        CHEST 1 VIEW        HISTORY:  POST THORA        COMPARISON: 2024        TECHNIQUE: AP portable chest radiograph        IMPRESSION:        There is a trace residual left pleural effusion with atelectasis in the left   lower lung. There is no pneumothorax. Right lung remains clear.        Signer Name: Gordo Santos Jr, MD    Signed: 3/11/2024 4:16 PM CDT    ()        DICTATING PHYSICIAN:  IVY SANTOS MD                   Date Dictated: 24 1010        Signed By:  IVY SANTOS MD     Date Signed:  24 1621     CC: PJ UGALDE CNP ; PJ UGALDE CNP      ADMITTING PHYSICIAN:                                                                                                    ORDERING PHY: PJ UGALDE CNP                                                                                                                                                      ATTENDING PHY: PJ UGALDE CNP    Patient Status:  REG CLI    Admit Service Date: 24       [facility] UNKNOWN RAD EAP                                RADIOLOGY REPORT        PT NAME: PATRICIA BASHIR      [facility]     : 1965 F 58             [address]    ACCT: [id number]                                              [facility] Rec #: [id number]                                        [id number]    Patient Location: AL.RADUS/             Procedure: THORACENTESIS WITH IMAGING    REQUISITION #: [id number]      REPORT #: [id number]           DATE OF EXAM: 24    TIME OF EXAM: 0900       PROCEDURE: Ultrasound-guided thoracentesis        Procedural Personnel    Attending physician(s): Shukri Becker MD    Fellow physician(s): None    Resident  physician(s): None    Advanced practice provider(s): None        Pre-procedure diagnosis: Shortness of breath    Post-procedure diagnosis: Same    Indication: Diagnostic and therapeutic    Additional clinical history: None        Complications: No immediate complications.        IMPRESSION:        Ultrasound-guided left thoracentesis with drainage of 150 mL of   serosanguinous fluid.        Plan:        Resume care by clinical team.    _______________________________________________________________        PROCEDURE SUMMARY:    - Ultrasound-guided thoracentesis    - Additional procedure(s): None        PROCEDURE DETAILS:        Pre-procedure    Consent: Informed consent for the procedure including risks, benefits and   alternatives was obtained and time-out was performed prior to the procedure.    Preparation: The site was prepared and draped using maximal sterile barrier    technique including cutaneous antisepsis.        Anesthesia/sedation    Level of anesthesia/sedation: No sedation    Anesthesia/sedation administered by: Not applicable    Total intra-service sedation time (minutes): Not applicable        Limited thoracic ultrasound    Limited thoracic ultrasound was performed.    Left hemithorax findings: Small pleural effusion    Right hemithorax findings: Not investigated        Thoracentesis    Local anesthesia was administered. A safe window for thoracentesis was   identified with ultrasound. The pleural space was accessed and fluid return    confirmed position. The fluid was drained.  The catheter was removed and a   sterile dressing was applied.    Catheter size (Fr):5    Catheter valve: Yes    Fluid appearance: serosanguinous    Volume drained (mL): 150    Post-drainage ultrasound: No residual pleural effusion noted.        Additional Details    Additional description of procedure: None    Registry event: / /    Device used: None    Equipment details: None    Specimens removed: Aspirated fluid was sent  for analysis.    Estimated blood loss (mL): Less than 10    Standardized report: SIR_Thoracentesis_v3.1        Attestation    Signer name: Shukri Becker    I attest that I was present for the entire procedure. I reviewed the stored    images and agree with the report as written.                        DICTATING PHYSICIAN:  SHUKRI BECKER MD                   Date Dictated: 03/11/24 1118        Signed By:  SHUKRI BECKER MD <Electronically signed by SHUKRI BECKER MD   in OV>    Date Signed:  03/11/24 1120     CC: PJ UGALDE CNP ; PJ UGALDE CNP      ADMITTING PHYSICIAN:                                                                                                    ORDERING PHY: PJ UGALDE CNP                                                                                                                                                      ATTENDING PHY: PJ UGALDE CNP    Patient Status:  REG CLI    Admit Service Date: 03/11/24                Accessory Clinical Data:  Chest x-ray:    CT scan:     PFTs:     6MWT:     TTE:    Lab data:    All radiographic imaging of the chest, PFT tracings/data, and 6MWT data have been independently reviewed and interpreted unless otherwise specified.     Assessment and Plan:      Problem List Items Addressed This Visit          Pulmonary    Chronic obstructive pulmonary disease    Current Assessment & Plan     Continue with Trelegy.  We will send refills to pharmacy.  We will perform a 6 minute walk test to look for any exertional hypoxia.  She is still smoking about half a pack per day.  She states that she is in remission for her small-cell lung cancer.         Relevant Medications    albuterol-ipratropium (DUO-NEB) 2.5 mg-0.5 mg/3 mL nebulizer solution    fluticasone-umeclidin-vilanter (TRELEGY ELLIPTA) 100-62.5-25 mcg DsDv    Pleural effusion on left - Primary    Current Assessment & Plan     Still having significant shortness of breath on  exertion.  We will repeat chest x-ray to make sure pleural fluid has not reaccumulated.  Consider referral to Cardiology.         Relevant Orders    X-Ray Chest PA And Lateral    Stress test, pulmonary (Completed)      Orders Placed This Encounter   Procedures    X-Ray Chest PA And Lateral     Standing Status:   Future     Number of Occurrences:   1     Standing Expiration Date:   4/4/2025     Order Specific Question:   May the Radiologist modify the order per protocol to meet the clinical needs of the patient?     Answer:   Yes     Order Specific Question:   Release to patient     Answer:   Immediate    Stress test, pulmonary     Standing Status:   Future     Number of Occurrences:   1     Standing Expiration Date:   4/4/2025     Order Specific Question:   Reason for study     Answer:   Oxygen prescription      High complexity case.  60 min were spent in reviewing the important data including imaging studies on a different EMR, laboratory data, notes from other consultants and reviewing care with other providers with more than 50% of the time spent face-to-face on counseling, explaining the disease process, progression, importance of compliance with prescribed medications and the importance of follow-up.      Follow up in about 3 months (around 7/4/2024) for COPD.         ADDENDUM: No exertional hypoxia on 6MWT. BP is elevated today with tachycardia. With history of exudative pleural effusion, would consider further cardiology workup. Will defer to primary.

## 2024-04-04 NOTE — ASSESSMENT & PLAN NOTE
Continue with Trelegy.  We will send refills to pharmacy.  We will perform a 6 minute walk test to look for any exertional hypoxia.  She is still smoking about half a pack per day.  She states that she is in remission for her small-cell lung cancer.

## 2024-04-08 ENCOUNTER — TELEPHONE (OUTPATIENT)
Dept: SMOKING CESSATION | Facility: CLINIC | Age: 59
End: 2024-04-08
Payer: MEDICARE

## 2024-04-09 ENCOUNTER — CLINICAL SUPPORT (OUTPATIENT)
Dept: SMOKING CESSATION | Facility: CLINIC | Age: 59
End: 2024-04-09

## 2024-04-09 DIAGNOSIS — F17.200 NICOTINE DEPENDENCE: Primary | ICD-10-CM

## 2024-04-09 NOTE — PROGRESS NOTES
Individual Follow-Up Form    4/9/2024    Quit Date: TBD    Clinical Status of Patient: Outpatient    Length of Service: 30 minutes    Continuing Medication: yes  Chantix     Target Symptoms: Withdrawal and medication side effects. The following were  rated moderate (3) to severe (4) on TCRS:  Moderate (3): None Reported  Severe (4): None Reported    Comments: Spoke with patient at length via phone regarding tobacco cessation progress. Patient remains on prescribed tobacco cessation medication 1mg Chantix without any negative side effects at this time.  Patient remains smoking 17-18 cigarettes per day.  Patient will cut down to 15 cigarettes a day this week.  Reviewed strategies, habitual behavior, high risks situations, understanding urges and cravings, stress and relaxation with open discussion and additional interventions, Introduced lapses, relapses, understanding them and analyzing the situation of a lapse, conflict issues that may be linked to a lapse.  Counselor discussed the 4ds (delay, drink, distract, deep breathing) to address nicotine cravings.  Counselor suggested that patient set daily smoking limits by limiting the number of cigarettes she has access to daily, example one per hour.  Counselor encouraged patent to utilize coping skills such as journaling,  listening to music, watching preferred TV shows, and walking away and deep breathing to help decompress and minimize stress and help deter her from smoking.  Patient appeared to be receptive to the information given.  Counselor will continue to motivate patient to be tobacco free.    Diagnosis: F17.200    Next Visit: 1 week

## 2024-04-11 ENCOUNTER — CLINICAL SUPPORT (OUTPATIENT)
Dept: SMOKING CESSATION | Facility: CLINIC | Age: 59
End: 2024-04-11

## 2024-04-11 DIAGNOSIS — F17.200 NICOTINE DEPENDENCE: Primary | ICD-10-CM

## 2024-04-11 PROCEDURE — 99999 PR PBB SHADOW E&M-EST. PATIENT-LVL I: CPT | Mod: PBBFAC,,,

## 2024-04-15 ENCOUNTER — CLINICAL SUPPORT (OUTPATIENT)
Dept: SMOKING CESSATION | Facility: CLINIC | Age: 59
End: 2024-04-15

## 2024-04-15 DIAGNOSIS — F17.200 NICOTINE DEPENDENCE: Primary | ICD-10-CM

## 2024-04-15 NOTE — PROGRESS NOTES
Spoke with patient today in regard to smoking cessation progress for 3 month telephone follow up, she states not tobacco free. Patient is currently enrolled in the program with a scheduled follow up visit and states the use of Chantix to help aid in her quit attempt. Informed patient of benefit period, future follow ups, and contact information if any further help or support is needed. Will complete smart form for 3 month follow up on Quit attempt #1.

## 2024-04-15 NOTE — PROGRESS NOTES
Individual Follow-Up Form    4/15/2024    Quit Date: TBD    Clinical Status of Patient: Outpatient    Length of Service: 15 minutes    Continuing Medication: yes  Chantix     Target Symptoms: Withdrawal and medication side effects. The following were  rated moderate (3) to severe (4) on TCRS:  Moderate (3): None Reported  Severe (4): None Reported    Comments: Spoke with patient at length via phone regarding tobacco cessation progress. Patient remains on prescribed tobacco cessation medication 1mg Chantix without any negative side effects at this time.  Patient remains smoking 17-18 cigarettes per day.  Patient will cut down to 15 cigarettes a day this week.  Counselor discussed the 4ds (delay, drink, distract, deep breathing) to address nicotine cravings.  Patient continues to experience higher levels of stress.  Patient recently shared that her medical insurance was discontinued and was attempting to find anther insurance option.  Patient does not need a refill for her prescription.  Patient appeared to be receptive to the information given.  Counselor will continue to motivate patient to be tobacco free.    *Created Addendum to change billing code    Diagnosis: F17.200    Next Visit: 1 week

## 2024-04-22 ENCOUNTER — CLINICAL SUPPORT (OUTPATIENT)
Dept: SMOKING CESSATION | Facility: CLINIC | Age: 59
End: 2024-04-22

## 2024-04-22 DIAGNOSIS — F17.200 NICOTINE DEPENDENCE: Primary | ICD-10-CM

## 2024-04-22 NOTE — PROGRESS NOTES
Individual Follow-Up Form    4/22/2024    Quit Date: TBD    Clinical Status of Patient: Outpatient    Length of Service: 30 minutes    Continuing Medication: yes  Chantix     Target Symptoms: Withdrawal and medication side effects. The following were  rated moderate (3) to severe (4) on TCRS:  Moderate (3): Rash on tongue  Severe (4): None Reported    Comments: Spoke with patient at length via phone regarding tobacco cessation progress. Patient remains on prescribed tobacco cessation medication 1mg Chantix without any negative side effects at this time.  Patient decreased to 15 cigarettes daily.  Patient will cut down to 14 cigarettes a day this week.  Reviewed strategies, habitual behavior, stress, and high risk situations. Introduced stress with addition interventions, SOLVE, relaxation with interventions, nutrition, exercise, weight gain, and the importance of rewarding oneself for accomplishments toward becoming tobacco free. Open discussion of all items with interventions. Patient states she has been experiencing a rash on her tongue the last 2 weeks.  Patient is unsure if its related to her Chantix prescription.  Counselor recommended patient decrease to one pill daily to help alleviate rash side effect.  Counselor provided patient with sweet tarts to help curve nicotine cravings.  Patient appeared to be receptive to the information given.  Counselor will continue to motivate patient to be tobacco free.    Diagnosis: F17.200    Next Visit: 1 week

## 2024-04-29 ENCOUNTER — TELEPHONE (OUTPATIENT)
Dept: SMOKING CESSATION | Facility: CLINIC | Age: 59
End: 2024-04-29
Payer: MEDICARE

## 2024-04-30 ENCOUNTER — CLINICAL SUPPORT (OUTPATIENT)
Dept: SMOKING CESSATION | Facility: CLINIC | Age: 59
End: 2024-04-30

## 2024-04-30 DIAGNOSIS — F17.200 NICOTINE DEPENDENCE: Primary | ICD-10-CM

## 2024-04-30 NOTE — PROGRESS NOTES
Individual Follow-Up Form    4/30/2024    Quit Date: TBD    Clinical Status of Patient: Outpatient    Length of Service: 30 minutes    Continuing Medication: yes  Chantix     Target Symptoms: Withdrawal and medication side effects. The following were  rated moderate (3) to severe (4) on TCRS:  Moderate (3): None Reported  Severe (4): None Reported    Comments: Spoke with patient at length via phone regarding tobacco cessation progress. Patient remains on prescribed tobacco cessation medication 1mg Chantix without any negative side effects at this time.  Patient decreased to 12-14 cigarettes daily.   Patient will cut down to 10 cigarettes a day this week.  Reviewed strategies, habitual behavior, high risks situations, understanding urges and cravings, stress and relaxation with open discussion and additional interventions, Introduced lapses, relapses, understanding them and analyzing the situation of a lapse, conflict issues that may be linked to a lapse. Patient states the soreness on her tongue has decreased.  She's using sweet tarts to help address nicotine cravings.   Patient appeared to be receptive to the information given.  Counselor will continue to motivate patient to be tobacco free.    Diagnosis: F17.200    Next Visit: 1 week

## 2024-05-15 ENCOUNTER — TELEPHONE (OUTPATIENT)
Dept: SMOKING CESSATION | Facility: CLINIC | Age: 59
End: 2024-05-15
Payer: COMMERCIAL

## 2024-05-15 DIAGNOSIS — G25.81 RESTLESS LEG: ICD-10-CM

## 2024-05-15 RX ORDER — GABAPENTIN 400 MG/1
400 CAPSULE ORAL 3 TIMES DAILY
Qty: 90 CAPSULE | Refills: 10 | Status: SHIPPED | OUTPATIENT
Start: 2024-05-15 | End: 2024-05-16 | Stop reason: SDUPTHER

## 2024-05-15 NOTE — TELEPHONE ENCOUNTER
Spoke with patient regarding recent smoking cessation progress.  Patient states she continues to take 1mg Chantix without negative side affects.  Patient states she has trimmed down to 15-20 cigarettes daily.  Patient also states she able to wait between 1-2 hours before smoking a cigarette.  Patient states her stress levels have decreased.  Counselor scheduled follow up appointment for 5/21/2024 @4:30pm

## 2024-05-16 ENCOUNTER — OFFICE VISIT (OUTPATIENT)
Dept: PRIMARY CARE CLINIC | Facility: CLINIC | Age: 59
End: 2024-05-16
Payer: MEDICARE

## 2024-05-16 ENCOUNTER — PATIENT MESSAGE (OUTPATIENT)
Dept: PRIMARY CARE CLINIC | Facility: CLINIC | Age: 59
End: 2024-05-16

## 2024-05-16 ENCOUNTER — CLINICAL SUPPORT (OUTPATIENT)
Dept: OBSTETRICS AND GYNECOLOGY | Facility: CLINIC | Age: 59
End: 2024-05-16
Payer: MEDICARE

## 2024-05-16 DIAGNOSIS — G25.81 RESTLESS LEG: ICD-10-CM

## 2024-05-16 DIAGNOSIS — Z01.89 ROUTINE LAB DRAW: Primary | ICD-10-CM

## 2024-05-16 DIAGNOSIS — C34.92 SMALL CELL LUNG CANCER, LEFT: Primary | ICD-10-CM

## 2024-05-16 DIAGNOSIS — J90 PLEURAL EFFUSION: ICD-10-CM

## 2024-05-16 DIAGNOSIS — J44.9 CHRONIC OBSTRUCTIVE PULMONARY DISEASE, UNSPECIFIED COPD TYPE: ICD-10-CM

## 2024-05-16 DIAGNOSIS — R63.4 WEIGHT LOSS: ICD-10-CM

## 2024-05-16 DIAGNOSIS — E78.5 HYPERLIPIDEMIA, UNSPECIFIED HYPERLIPIDEMIA TYPE: ICD-10-CM

## 2024-05-16 LAB
ABS NRBC COUNT: 0 THOU/UL (ref 0–0.01)
ABSOLUTE BASOPHIL: 0.1 10*3/UL (ref 0–0.3)
ABSOLUTE EOSINOPHIL: 0.2 10*3/UL (ref 0–0.6)
ABSOLUTE IMMATURE GRAN: 0.04 THOU/UL (ref 0–0.03)
ABSOLUTE LYMPHOCYTE: 1.2 10*3/UL (ref 1.2–4)
ABSOLUTE MONOCYTE: 1.1 10*3/UL (ref 0.1–0.8)
ALBUMIN SERPL BCP-MCNC: 4.1 G/DL (ref 3.4–5)
ALP SERPL-CCNC: 104 U/L (ref 45–117)
ALT SERPL W P-5'-P-CCNC: 9 U/L (ref 13–56)
ANION GAP SERPL CALC-SCNC: 9 MMOL/L (ref 3–11)
AST SERPL-CCNC: 9 U/L (ref 15–37)
BASOPHILS NFR BLD: 0.5 % (ref 0–3)
BILIRUB SERPL-MCNC: 0.4 MG/DL (ref 0.2–1)
BUN SERPL-MCNC: 7 MG/DL (ref 7–18)
BUN/CREAT SERPL: 11.29 RATIO
CALCIUM SERPL-MCNC: 9.1 MG/DL (ref 8.5–10.1)
CHLORIDE SERPL-SCNC: 100 MMOL/L (ref 98–107)
CHOLEST SERPL-MSCNC: 193 MG/DL
CO2 SERPL-SCNC: 25 MMOL/L (ref 21–32)
CREAT SERPL-MCNC: 0.62 MG/DL (ref 0.55–1.02)
EOSINOPHIL NFR BLD: 1.9 % (ref 0–6)
ERYTHROCYTE [DISTWIDTH] IN BLOOD BY AUTOMATED COUNT: 13.2 % (ref 0–15.5)
GFR ESTIMATION: > 60
GLUCOSE SERPL-MCNC: 88 MG/DL (ref 74–106)
HCT VFR BLD AUTO: 46.1 % (ref 37–47)
HDLC SERPL-MCNC: 69 MG/DL
HGB BLD-MCNC: 15.5 G/DL (ref 12–16)
IMMATURE GRANULOCYTES: 0.4 % (ref 0–0.43)
LDLC SERPL CALC-MCNC: 102.2 MG/DL
LYMPHOCYTES NFR BLD: 11.9 % (ref 20–45)
MCH RBC QN AUTO: 32.8 PG (ref 27–32)
MCHC RBC AUTO-ENTMCNC: 33.6 % (ref 32–36)
MCV RBC AUTO: 97.5 FL (ref 80–99)
MONOCYTES NFR BLD: 11 % (ref 2–10)
NEUTROPHILS # BLD AUTO: 7.6 10*3/UL (ref 1.4–7)
NEUTROPHILS NFR BLD: 74.3 % (ref 50–80)
NUCLEATED RED BLOOD CELLS: 0 % (ref 0–0.2)
PLATELETS: 322 10*3/UL (ref 130–400)
PMV BLD AUTO: 10.3 FL (ref 9.2–12.2)
POTASSIUM SERPL-SCNC: 3.6 MMOL/L (ref 3.5–5.1)
PROT SERPL-MCNC: 8.1 G/DL (ref 6.4–8.2)
RBC # BLD AUTO: 4.73 10*6/UL (ref 4.2–5.4)
SODIUM BLD-SCNC: 134 MMOL/L (ref 131–143)
TRIGL SERPL-MCNC: 109 MG/DL (ref 0–149)
VLDL CHOLESTEROL: 22 MG/DL
WBC # BLD: 10.3 10*3/UL (ref 4.5–10)

## 2024-05-16 PROCEDURE — 3075F SYST BP GE 130 - 139MM HG: CPT | Mod: CPTII,S$GLB,, | Performed by: INTERNAL MEDICINE

## 2024-05-16 PROCEDURE — 99214 OFFICE O/P EST MOD 30 MIN: CPT | Mod: 25,S$GLB,, | Performed by: INTERNAL MEDICINE

## 2024-05-16 PROCEDURE — 99406 BEHAV CHNG SMOKING 3-10 MIN: CPT | Mod: S$GLB,,, | Performed by: INTERNAL MEDICINE

## 2024-05-16 PROCEDURE — 36415 COLL VENOUS BLD VENIPUNCTURE: CPT | Mod: S$GLB,,, | Performed by: INTERNAL MEDICINE

## 2024-05-16 PROCEDURE — 3079F DIAST BP 80-89 MM HG: CPT | Mod: CPTII,S$GLB,, | Performed by: INTERNAL MEDICINE

## 2024-05-16 RX ORDER — GABAPENTIN 400 MG/1
400 CAPSULE ORAL 3 TIMES DAILY
Qty: 90 CAPSULE | Refills: 3 | Status: SHIPPED | OUTPATIENT
Start: 2024-05-16

## 2024-05-16 RX ORDER — ALBUTEROL SULFATE 90 UG/1
1 AEROSOL, METERED RESPIRATORY (INHALATION) EVERY 6 HOURS PRN
Qty: 8.5 G | Refills: 1 | Status: SHIPPED | OUTPATIENT
Start: 2024-05-16

## 2024-05-16 RX ORDER — MEGESTROL ACETATE 40 MG/1
40 TABLET ORAL DAILY
Qty: 30 TABLET | Refills: 3 | Status: SHIPPED | OUTPATIENT
Start: 2024-05-16 | End: 2024-06-15

## 2024-05-16 NOTE — PROGRESS NOTES
Subjective:      Patient ID: Diana Prather is a 58 y.o. female.    Chief Complaint: Follow-up    HPI    Past Medical History:   Diagnosis Date    Allergy     Esophagitis     Fatty liver 08/29/2022    Diagnosed by Ultrasound which was order by Dr. Castillo.    GERD (gastroesophageal reflux disease)     Hiatal hernia     Schatzki's ring of distal esophagus     Small cell lung cancer 11/15/2021       Patient is here for follow up. She has a h/o small cell lung cancer incidentally detected on CT lung cancer screening and was treated by Oncology here at [facility]     COPD diagnosed due to chronic smoking history and is under the care of Pulmonology Sim RUSHING and is on symbicort with albuterol rescue inhaler  She recently had a left pleurocentesis done which was reported as an exudate and referral to Cardiology was requested. I don't see a cytology on the pleural fluid, negative culture report     She has a h/o  long standing nausea and her appetite is not what it used to be. She is on protonix.       Her gastroenterologist is Dr Birmingham and she has had several testing done  Abdominal US done showed fatty liver and contracted GB, HIDA scan was normal. CT is august showed fatty liver and some mild diverticulitis. She has had EGD and colonoscopy done by Dr Birmingham. Hasn't seen him in some time     She was placed on Reglan and compazine by  Smita Plaza NP. She developed some tremors which was concerning for TD so I asked her to discontinue reglan. She was referred to Neurology who felt this was due to Parkinsons and she was started on Sinimet, states tremors are somewhat improved       she had an appointment with Oncology in March but missed it and I don't see a follow up         Review of Systems   Constitutional:  Negative for chills and fever.   HENT:  Negative for hearing loss.    Eyes:  Negative for blurred vision.   Respiratory:  Negative for cough, shortness of breath and wheezing.    Cardiovascular:  Negative  "for chest pain, palpitations and leg swelling.   Gastrointestinal:  Negative for abdominal pain, blood in stool, constipation, diarrhea, melena, nausea and vomiting.   Genitourinary:  Negative for dysuria, frequency and urgency.   Musculoskeletal:  Negative for falls.   Skin:  Negative for rash.   Neurological:  Positive for tremors. Negative for dizziness and headaches.   Endo/Heme/Allergies:  Does not bruise/bleed easily.   Psychiatric/Behavioral:  Negative for depression. The patient is not nervous/anxious.      Objective:     Physical Exam  Vitals reviewed.   Constitutional:       Appearance: Normal appearance.   HENT:      Head: Normocephalic.      Mouth/Throat:      Mouth: Mucous membranes are moist.      Pharynx: Oropharynx is clear.   Eyes:      Extraocular Movements: Extraocular movements intact.      Conjunctiva/sclera: Conjunctivae normal.      Pupils: Pupils are equal, round, and reactive to light.   Cardiovascular:      Rate and Rhythm: Normal rate and regular rhythm.   Pulmonary:      Effort: Pulmonary effort is normal.      Breath sounds: Normal breath sounds.   Abdominal:      General: Bowel sounds are normal.   Musculoskeletal:      Right lower leg: No edema.      Left lower leg: No edema.   Skin:     General: Skin is warm.      Capillary Refill: Capillary refill takes less than 2 seconds.   Neurological:      Mental Status: She is alert and oriented to person, place, and time.   Psychiatric:         Mood and Affect: Mood normal.       /80   Pulse 90   Ht (P) 5' 6" (1.676 m)   Wt (P) 50.3 kg (111 lb)   LMP  (LMP Unknown)   SpO2 (P) 96%   BMI (P) 17.92 kg/m²     Assessment:       ICD-10-CM ICD-9-CM   1. Small cell lung cancer, left  C34.92 162.9   2. Pleural effusion  J90 511.9   3. Chronic obstructive pulmonary disease, unspecified COPD type  J44.9 496   4. Weight loss  R63.4 783.21   5. Restless leg  G25.81 333.94   6. Hyperlipidemia, unspecified hyperlipidemia type  E78.5 272.4     "   Plan:     Medication List with Changes/Refills   Current Medications    ALBUTEROL-IPRATROPIUM (DUO-NEB) 2.5 MG-0.5 MG/3 ML NEBULIZER SOLUTION    Take 3 mLs by nebulization every 6 (six) hours as needed for Wheezing. Rescue    CETIRIZINE (ZYRTEC) 10 MG TABLET    Take 1 tablet (10 mg total) by mouth once daily.    FAMOTIDINE (PEPCID) 20 MG TABLET    TAKE 1 TABLET BY MOUTH TWICE DAILY    FLUTICASONE PROPIONATE (FLONASE) 50 MCG/ACTUATION NASAL SPRAY    1 spray (50 mcg total) by Each Nostril route once daily.    HYDROCODONE-ACETAMINOPHEN (NORCO) 5-325 MG PER TABLET        LIDOCAINE VISCOUS 2 % SOLUTION        LORAZEPAM (ATIVAN) 0.5 MG TABLET    Take 1 tablet (0.5 mg total) by mouth every 8 (eight) hours as needed (tremors).    PANTOPRAZOLE (PROTONIX) 40 MG TABLET    Take 1 tablet (40 mg total) by mouth once daily.    PROCHLORPERAZINE (COMPAZINE) 10 MG TABLET    Take 1 tablet (10 mg total) by mouth 3 (three) times daily.    SERTRALINE (ZOLOFT) 50 MG TABLET    TAKE 1 TABLET BY MOUTH EVERY DAY    VARENICLINE (CHANTIX) 1 MG TAB    Take 1 tablet (1 mg total) by mouth 2 (two) times daily.   Changed and/or Refilled Medications    Modified Medication Previous Medication    ALBUTEROL (PROVENTIL/VENTOLIN HFA) 90 MCG/ACTUATION INHALER albuterol (PROVENTIL/VENTOLIN HFA) 90 mcg/actuation inhaler       Inhale 1 puff into the lungs every 6 (six) hours as needed for Wheezing or Shortness of Breath. Rescue    INHALE 2 PUFFS BY MOUTH EVERY 4 HOURS AS NEEDED FOR WHEEZING. *RESCUE INHALER*    FLUTICASONE-UMECLIDIN-VILANTER (TRELEGY ELLIPTA) 100-62.5-25 MCG DSDV fluticasone-umeclidin-vilanter (TRELEGY ELLIPTA) 100-62.5-25 mcg DsDv       Inhale 1 puff into the lungs once daily.    Inhale 1 puff into the lungs once daily.    GABAPENTIN (NEURONTIN) 400 MG CAPSULE gabapentin (NEURONTIN) 400 MG capsule       Take 1 capsule (400 mg total) by mouth 3 (three) times daily.    TAKE 1 CAPSULE BY MOUTH THREE TIMES DAILY    MEGESTROL (MEGACE) 40 MG TAB  megestroL (MEGACE) 40 MG Tab       Take 1 tablet (40 mg total) by mouth once daily.    Take 1 tablet (40 mg total) by mouth once daily.        1. Small cell lung cancer, left  -     Ambulatory referral/consult to Hematology / Oncology; Future; Expected date: 05/24/2024    2. Pleural effusion  -     Ambulatory referral/consult to Cardiology; Future; Expected date: 05/23/2024  -     Echo; Future  -     CBC Auto Differential; Future; Expected date: 05/16/2024  -     Comprehensive Metabolic Panel; Future; Expected date: 05/16/2024    3. Chronic obstructive pulmonary disease, unspecified COPD type  -     albuterol (PROVENTIL/VENTOLIN HFA) 90 mcg/actuation inhaler; Inhale 1 puff into the lungs every 6 (six) hours as needed for Wheezing or Shortness of Breath. Rescue  Dispense: 8.5 g; Refill: 1  -     fluticasone-umeclidin-vilanter (TRELEGY ELLIPTA) 100-62.5-25 mcg DsDv; Inhale 1 puff into the lungs once daily.  Dispense: 60 each; Refill: 6    4. Weight loss  -     megestroL (MEGACE) 40 MG Tab; Take 1 tablet (40 mg total) by mouth once daily.  Dispense: 30 tablet; Refill: 3    5. Restless leg  -     gabapentin (NEURONTIN) 400 MG capsule; Take 1 capsule (400 mg total) by mouth 3 (three) times daily.  Dispense: 90 capsule; Refill: 3    6. Hyperlipidemia, unspecified hyperlipidemia type  -     Lipid Panel; Future; Expected date: 05/16/2024       Assistance with smoking cessation was offered, including:  []  Medications  [x]  Counseling  []  Printed Information on Smoking Cessation  [x]  Referral to a Smoking Cessation Program    Patient was counseled regarding smoking for 3-10 minutes.     Future Appointments   Date Time Provider Department Center   5/21/2024  4:30 PM Sherlyn Bryant, [facility]   7/8/2024  9:40 AM Zuly Gonzalez MD [facility]   8/19/2024  9:40 AM Mirian Robertson MD [facility]

## 2024-05-17 ENCOUNTER — TELEPHONE (OUTPATIENT)
Dept: HEMATOLOGY/ONCOLOGY | Facility: CLINIC | Age: 59
End: 2024-05-17
Payer: MEDICARE

## 2024-05-17 VITALS — SYSTOLIC BLOOD PRESSURE: 130 MMHG | DIASTOLIC BLOOD PRESSURE: 80 MMHG | HEART RATE: 90 BPM
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
## 2024-05-20 ENCOUNTER — PATIENT MESSAGE (OUTPATIENT)
Dept: HEMATOLOGY/ONCOLOGY | Facility: CLINIC | Age: 59
End: 2024-05-20
Payer: MEDICARE

## 2024-05-20 ENCOUNTER — TELEPHONE (OUTPATIENT)
Dept: PRIMARY CARE CLINIC | Facility: CLINIC | Age: 59
End: 2024-05-20
Payer: MEDICARE

## 2024-05-20 ENCOUNTER — TELEPHONE (OUTPATIENT)
Dept: HEMATOLOGY/ONCOLOGY | Facility: CLINIC | Age: 59
End: 2024-05-20
Payer: MEDICARE

## 2024-05-20 NOTE — TELEPHONE ENCOUNTER
----- Message from Mirian Lantigua sent at 5/20/2024  3:09 PM CDT -----  Name of Who is Calling:  pt         What is the request in detail:requesting a call to see if echo is necessary as she cannot afford the cost.           Can the clinic reply by [facility]:no           [facility] [phone]
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
## 2024-05-21 ENCOUNTER — PATIENT MESSAGE (OUTPATIENT)
Dept: PRIMARY CARE CLINIC | Facility: CLINIC | Age: 59
End: 2024-05-21
Payer: MEDICARE

## 2024-05-21 ENCOUNTER — TELEPHONE (OUTPATIENT)
Dept: SMOKING CESSATION | Facility: CLINIC | Age: 59
End: 2024-05-21
Payer: MEDICARE

## 2024-05-22 ENCOUNTER — PATIENT MESSAGE (OUTPATIENT)
Dept: PRIMARY CARE CLINIC | Facility: CLINIC | Age: 59
End: 2024-05-22
Payer: MEDICARE

## 2024-06-12 ENCOUNTER — TELEPHONE (OUTPATIENT)
Dept: SMOKING CESSATION | Facility: CLINIC | Age: 59
End: 2024-06-12
Payer: MEDICARE

## 2024-06-12 NOTE — TELEPHONE ENCOUNTER
Spoke with patient regarding rescheduling missed follow up appointment.  Patient states she has trimmed down to 10 cigarettes daily.  Appointment has been rescheduled for 6/25/2024 @ 1:00pm phone appt.

## 2024-06-24 RX ORDER — AMITRIPTYLINE HYDROCHLORIDE 25 MG/1
25 TABLET, FILM COATED ORAL NIGHTLY PRN
Qty: 30 TABLET | Refills: 3 | Status: SHIPPED | OUTPATIENT
Start: 2024-06-24

## 2024-06-25 ENCOUNTER — CLINICAL SUPPORT (OUTPATIENT)
Dept: SMOKING CESSATION | Facility: CLINIC | Age: 59
End: 2024-06-25

## 2024-06-25 DIAGNOSIS — F17.200 NICOTINE DEPENDENCE: Primary | ICD-10-CM

## 2024-06-25 NOTE — PROGRESS NOTES
Individual Follow-Up Form    6/25/2024    Quit Date: TBD    Clinical Status of Patient: Outpatient    Length of Service: 30 minutes    Continuing Medication: yes  Chantix     Target Symptoms: Withdrawal and medication side effects. The following were  rated moderate (3) to severe (4) on TCRS:  Moderate (3): None Reported  Severe (4): None Reported    Comments: Spoke with patient at length via phone regarding tobacco cessation progress. Patient remains on prescribed tobacco cessation medication 1mg Chantix without any negative side effects at this time.  Patient decreased to 10 cigarettes daily.   Patient will cut down to 9 cigarettes a day this week.   Reviewed strategies, habitual behavior, stress, and high risk situations. Introduced stress with addition interventions, SOLVE, relaxation with interventions, nutrition, exercise, weight gain, and the importance of rewarding oneself for accomplishments toward becoming tobacco free. Open discussion of all items with interventions.  Counselor called in refill request  for Chantix prescription.  Counselor enouraged patient  to use hard candy to address nicotine cravings.  Patient appeared to be receptive to the information given.  Counselor will continue to motivate patient to be tobacco free.    Diagnosis: F17.200    Next Visit: 1 week

## 2024-07-08 ENCOUNTER — CLINICAL SUPPORT (OUTPATIENT)
Dept: SMOKING CESSATION | Facility: CLINIC | Age: 59
End: 2024-07-08

## 2024-07-08 DIAGNOSIS — F17.200 NICOTINE DEPENDENCE: Primary | ICD-10-CM

## 2024-07-08 NOTE — PROGRESS NOTES
Spoke with patient today in regard to smoking cessation progress for 6 month phone follow up on Quit 1.  Patient not tobacco free at this time but stated that she continues to work on her Quit.  Patient states she is still smoking and we discussed exploring all her options with medication.  Patient currently enrolled in program for Quit 1 and has an appointment scheduled with his CTTS.  Patient states that she is pleased with the counseling and support she is receiving from CTTS, Sherlyn Bryant.  Informed patient of benefit period, future follow ups, and contact information if any further help or support is needed.  Will complete smart form for 6 month follow up on Quit attempt # 1.

## 2024-07-09 ENCOUNTER — TELEPHONE (OUTPATIENT)
Dept: SMOKING CESSATION | Facility: CLINIC | Age: 59
End: 2024-07-09
Payer: MEDICARE

## 2024-07-09 NOTE — TELEPHONE ENCOUNTER
Attempted to contact patient to conduct a scheduled follow up appointment.  Patient did not answer.  Counselor left a voice message with name and contact information.    *Delayed charting due to no internet service.

## 2024-07-16 DIAGNOSIS — R11.0 NAUSEA: Primary | ICD-10-CM

## 2024-07-16 RX ORDER — PROCHLORPERAZINE MALEATE 10 MG
10 TABLET ORAL 3 TIMES DAILY
Qty: 90 TABLET | Refills: 11 | Status: SHIPPED | OUTPATIENT
Start: 2024-07-16

## 2024-07-21 DIAGNOSIS — K21.9 GASTROESOPHAGEAL REFLUX DISEASE, UNSPECIFIED WHETHER ESOPHAGITIS PRESENT: ICD-10-CM

## 2024-07-22 ENCOUNTER — TELEPHONE (OUTPATIENT)
Dept: SMOKING CESSATION | Facility: CLINIC | Age: 59
End: 2024-07-22
Payer: MEDICARE

## 2024-07-22 RX ORDER — PANTOPRAZOLE SODIUM 40 MG/1
40 TABLET, DELAYED RELEASE ORAL
Qty: 30 TABLET | Refills: 3 | Status: SHIPPED | OUTPATIENT
Start: 2024-07-22

## 2024-07-23 ENCOUNTER — HOSPITAL ENCOUNTER (OUTPATIENT)
Dept: RADIOLOGY | Facility: CLINIC | Age: 59
Discharge: HOME | End: 2024-07-23
Payer: MEDICARE

## 2024-07-23 ENCOUNTER — OFFICE VISIT (OUTPATIENT)
Dept: PULMONOLOGY | Facility: CLINIC | Age: 59
End: 2024-07-23
Payer: MEDICARE

## 2024-07-23 VITALS
BODY MASS INDEX: 19.87 KG/M2 | HEIGHT: 66 IN | OXYGEN SATURATION: 97 % | HEART RATE: 94 BPM | SYSTOLIC BLOOD PRESSURE: 145 MMHG | WEIGHT: 123.63 LBS | RESPIRATION RATE: 22 BRPM | DIASTOLIC BLOOD PRESSURE: 82 MMHG

## 2024-07-23 DIAGNOSIS — J44.9 CHRONIC OBSTRUCTIVE PULMONARY DISEASE, UNSPECIFIED COPD TYPE: Primary | ICD-10-CM

## 2024-07-23 DIAGNOSIS — R06.02 SOB (SHORTNESS OF BREATH): ICD-10-CM

## 2024-07-23 DIAGNOSIS — J90 PLEURAL EFFUSION ON LEFT: ICD-10-CM

## 2024-07-23 DIAGNOSIS — J44.1 COPD WITH ACUTE EXACERBATION: ICD-10-CM

## 2024-07-23 DIAGNOSIS — J44.9 CHRONIC OBSTRUCTIVE PULMONARY DISEASE, UNSPECIFIED COPD TYPE: ICD-10-CM

## 2024-07-23 PROCEDURE — 3079F DIAST BP 80-89 MM HG: CPT | Mod: CPTII,S$GLB,,

## 2024-07-23 PROCEDURE — 99215 OFFICE O/P EST HI 40 MIN: CPT | Mod: S$GLB,,,

## 2024-07-23 PROCEDURE — 1159F MED LIST DOCD IN RCRD: CPT | Mod: CPTII,S$GLB,,

## 2024-07-23 PROCEDURE — 3008F BODY MASS INDEX DOCD: CPT | Mod: CPTII,S$GLB,,

## 2024-07-23 PROCEDURE — 3077F SYST BP >= 140 MM HG: CPT | Mod: CPTII,S$GLB,,

## 2024-07-23 PROCEDURE — 71046 X-RAY EXAM CHEST 2 VIEWS: CPT | Mod: 26,,, | Performed by: RADIOLOGY

## 2024-07-23 RX ORDER — IPRATROPIUM BROMIDE AND ALBUTEROL SULFATE 2.5; .5 MG/3ML; MG/3ML
3 SOLUTION RESPIRATORY (INHALATION) EVERY 6 HOURS PRN
Qty: 75 ML | Refills: 0 | Status: SHIPPED | OUTPATIENT
Start: 2024-07-23 | End: 2025-07-23

## 2024-07-23 RX ORDER — AZITHROMYCIN 250 MG/1
TABLET, FILM COATED ORAL
Qty: 6 TABLET | Refills: 0 | Status: SHIPPED | OUTPATIENT
Start: 2024-07-23 | End: 2024-07-28

## 2024-07-23 RX ORDER — METHYLPREDNISOLONE 4 MG/1
TABLET ORAL
Qty: 21 EACH | Refills: 0 | Status: SHIPPED | OUTPATIENT
Start: 2024-07-23 | End: 2024-08-13

## 2024-07-23 NOTE — PROGRESS NOTES
Subjective:    Patient Identification:  Patient ID: Diana Prather is a 58 y.o. female.    Referring Provider:  No ref. provider found     Chief Complaint:  COPD      History of Present Illness:    Diana Prather is a 58 y.o. female who presents for a COPD follow-up.  She has a history of exudative pleural effusions that was recently tapped back in March of this year.  Fluid cytology and cultures were negative.  She still has significant shortness of breath.  She has been referred to Dr. Trivedi and is currently undergoing workup.  She states that she did some testing at his office and is supposed to be doing a PET scan soon.  I do not have records from his visit to review.  She is using Trelegy for COPD maintenance therapy, however she does not feel that her inhalers are working.  She states that she wakes up at night usually around 3 or 4 in the morning gasping for air.  She has trouble breathing lying flat, but she also seems pretty short of breath here in the office.  Her SpO2 is 97% on room air and she recently had a 6 minute walk test last visit which did not show any exertional hypoxia which prompted her referral to Cardiology.  She does have some wheezing here today on physical exam.    Review of Systems:  Review of Systems   Constitutional:  Negative for fever, chills, appetite change, night sweats and weakness.   HENT:  Negative for postnasal drip, rhinorrhea, sore throat, trouble swallowing, voice change, congestion and ear pain.    Respiratory:  Positive for cough, sputum production, shortness of breath, wheezing, orthopnea, dyspnea on extertion and use of rescue inhaler. Negative for hemoptysis and somnolence.    Cardiovascular:  Negative for chest pain, palpitations and leg swelling.   Genitourinary:  Negative for difficulty urinating and hematuria.   Endocrine:  Negative for polydipsia, polyphagia, cold intolerance, heat intolerance and polyuria.    Musculoskeletal:  Negative for joint swelling  "and myalgias.   Skin:  Negative for rash.   Gastrointestinal:  Negative for nausea, vomiting, abdominal pain and abdominal distention.   Neurological:  Negative for dizziness, syncope, light-headedness and headaches.   Psychiatric/Behavioral:  Negative for confusion and sleep disturbance. The patient is not nervous/anxious.        Allergies:   Review of patient's allergies indicates:   Allergen Reactions    Pyridium [phenazopyridine] Hives       Medications:      Past Medical History:      Past Medical History:   Diagnosis Date    Allergy     Esophagitis     Fatty liver 08/29/2022    Diagnosed by Ultrasound which was order by Dr. Castillo.    GERD (gastroesophageal reflux disease)     Hiatal hernia     Schatzki's ring of distal esophagus     Small cell lung cancer 11/15/2021       Family History:      Family History   Problem Relation Name Age of Onset    Emphysema Mother      Diabetes Mother      Diabetes Father      Alcohol abuse Father      No Known Problems Sister      No Known Problems Brother      No Known Problems Maternal Aunt      No Known Problems Maternal Uncle      No Known Problems Paternal Aunt      No Known Problems Paternal Uncle      No Known Problems Maternal Grandmother      No Known Problems Maternal Grandfather      No Known Problems Paternal Grandmother      No Known Problems Paternal Grandfather      No Known Problems Daughter          Social History:      Past Surgical History:   Procedure Laterality Date    COLONOSCOPY       in [place] and he did the dialation     COLONOSCOPY  08/17/2022    ESOPHAGEAL DILATION      ESOPHAGOGASTRODUODENOSCOPY  08/10/2022    SPINAL FUSION      neck       Physical Exam:  BP (!) 145/82 (BP Location: Left arm, Patient Position: Sitting, BP Method: Medium (Automatic))   Pulse 94   Resp (!) 22   Ht 5' 6" (1.676 m)   Wt 56.1 kg (123 lb 9.6 oz)   LMP  (LMP Unknown)   SpO2 97%   BMI 19.95 kg/m²   Physical Exam   Constitutional: She is oriented to person, " "place, and time. She appears not cachectic. No distress.   HENT:   Head: Normocephalic.   Right Ear: External ear normal.   Left Ear: External ear normal.   Nose: Nose normal. No mucosal edema. No polyps.   Mouth/Throat: Oropharynx is clear and moist. Normal dentition. No oropharyngeal exudate. Mallampati Score: III.   Neck: No JVD present. No tracheal deviation present. No thyromegaly present.   Cardiovascular: Normal rate, regular rhythm, normal heart sounds and intact distal pulses. Exam reveals no gallop and no friction rub.   No murmur heard.  Pulmonary/Chest: Normal expansion, symmetric chest wall expansion and effort normal. No stridor. No respiratory distress. She has no decreased breath sounds. She has wheezes. She has no rhonchi. She has no rales. Chest wall is not dull to percussion. She exhibits no tenderness. Negative for egophony. Negative for tactile fremitus.   Abdominal: Soft. Bowel sounds are normal. She exhibits no distension and no mass. There is no hepatosplenomegaly. There is no abdominal tenderness. There is no rebound and no guarding. No hernia.   Musculoskeletal:         General: No tenderness, deformity or edema. Normal range of motion.      Cervical back: Normal range of motion and neck supple.   Lymphadenopathy: No supraclavicular adenopathy is present.     She has no cervical adenopathy.     She has no axillary adenopathy.   Neurological: She is alert and oriented to person, place, and time. She has normal reflexes. She displays normal reflexes. No cranial nerve deficit. She exhibits normal muscle tone.   Skin: Skin is warm and dry. No rash noted. She is not diaphoretic. No cyanosis or erythema. No pallor. Nails show no clubbing.   Psychiatric: She has a normal mood and affect. Her behavior is normal. Judgment and thought content normal.           No results found for: "PREFEV1", "RDS5VFADAL", "PREFVC", "FVCPREREF", "NOBFUI0UWL", "UMS3WBFIMWL", "KFUM4NZO", "OWOR2ZNM", "PREDLCO", " ""DLCOSBPRRF", "DLCOADJPRE", "DLCOCSBRPRRF", "POSTFEV1", "POSTFVC", "ADWUTCZ3JUW"   X-Ray Chest PA And Lateral  Narrative: EXAM:  XR CHEST PA AND LATERAL    CLINICAL HISTORY:  [J90]-Pleural effusion, not elsewhere classified.    TECHNIQUE: PA and lateral chest x-ray performed.    FINDINGS: Comparison is made with the most recent prior chest x-ray available. The cardiomediastinal silhouette is within normal limits. Stable small to moderate volume left pleural effusion with adjacent subsegmental atelectasis in the left lower lung. The remainder the lungs appear clear of active disease.  No pneumothorax. No acute osseous abnormality is identified.  Impression:  Stable left pleural effusion and adjacent subsegmental atelectasis. No new findings..    Finalized on: 4/4/2024 2:02 PM By:  Jordi Mratinez MD  [facility]# [id number]      2024-04-04 14:04:[id number]    [facility]           Accessory Clinical Data:  Chest x-ray:    CT scan:     PFTs:     6MWT:     TTE:    Lab data:    All radiographic imaging of the chest, PFT tracings/data, and 6MWT data have been independently reviewed and interpreted unless otherwise specified.     Assessment and Plan:      Problem List Items Addressed This Visit          Pulmonary    COPD with acute exacerbation - Primary    Relevant Medications    methylPREDNISolone (MEDROL DOSEPACK) 4 mg tablet    azithromycin (Z-PANDA) 250 MG tablet    albuterol-ipratropium (DUO-NEB) 2.5 mg-0.5 mg/3 mL nebulizer solution    Pleural effusion on left    SOB (shortness of breath)      Orders Placed This Encounter   Procedures    X-Ray Chest PA And Lateral     Standing Status:   Future     Number of Occurrences:   1     Standing Expiration Date:   7/23/2025     Order Specific Question:   May the Radiologist modify the order per protocol to meet the clinical needs of the patient?     Answer:   Yes     Order Specific Question:   Release to patient     Answer:   Immediate    Nursing communication     Please order nocturnal pulse ox " study        I will treat her with a Medrol Dosepak and a Z-Rafy for COPD exacerbation.  We will repeat a chest x-ray to look for any increase in pleural fluid.  She may need a therapeutic thoracentesis as she appears very short of breath at rest.  She should continue to use her Trelegy and her DuoNebs as needed.  We will follow-up in 1 month.    We will order a nocturnal pulse to assess need for supplemental oxygen at night.    Follow up in about 1 month (around 8/23/2024).         High complexity case.  60 min were spent in reviewing the important data including imaging studies on a different EMR, laboratory data, notes from other consultants and reviewing care with other providers with more than 50% of the time spent face-to-face on counseling, explaining the disease process, progression, importance of compliance with prescribed medications and the importance of follow-up.

## 2024-08-19 ENCOUNTER — OFFICE VISIT (OUTPATIENT)
Dept: PRIMARY CARE CLINIC | Facility: CLINIC | Age: 59
End: 2024-08-19
Payer: MEDICARE

## 2024-08-19 VITALS
DIASTOLIC BLOOD PRESSURE: 84 MMHG | HEIGHT: 66 IN | HEART RATE: 102 BPM | OXYGEN SATURATION: 99 % | SYSTOLIC BLOOD PRESSURE: 128 MMHG | WEIGHT: 115 LBS | BODY MASS INDEX: 18.48 KG/M2

## 2024-08-19 DIAGNOSIS — F32.A DEPRESSION, UNSPECIFIED DEPRESSION TYPE: ICD-10-CM

## 2024-08-19 DIAGNOSIS — Z12.39 ENCOUNTER FOR SCREENING FOR MALIGNANT NEOPLASM OF BREAST, UNSPECIFIED SCREENING MODALITY: Primary | ICD-10-CM

## 2024-08-19 DIAGNOSIS — G25.81 RESTLESS LEG: ICD-10-CM

## 2024-08-19 DIAGNOSIS — J44.1 COPD WITH ACUTE EXACERBATION: ICD-10-CM

## 2024-08-19 DIAGNOSIS — F17.200 SMOKING ADDICTION: ICD-10-CM

## 2024-08-19 DIAGNOSIS — Z12.31 ENCOUNTER FOR SCREENING MAMMOGRAM FOR MALIGNANT NEOPLASM OF BREAST: ICD-10-CM

## 2024-08-19 RX ORDER — GABAPENTIN 400 MG/1
400 CAPSULE ORAL 3 TIMES DAILY
Qty: 90 CAPSULE | Refills: 3 | Status: SHIPPED | OUTPATIENT
Start: 2024-08-19

## 2024-08-19 NOTE — PROGRESS NOTES
Subjective:      Patient ID: Diana Prather is a 58 y.o. female.    Chief Complaint: Follow-up    HPI    Past Medical History:   Diagnosis Date    Allergy     Esophagitis     Fatty liver 08/29/2022    Diagnosed by Ultrasound which was order by Dr. Castillo.    GERD (gastroesophageal reflux disease)     Hiatal hernia     Schatzki's ring of distal esophagus     Small cell lung cancer 11/15/2021       Patient is here for follow up. She has a h/o small cell lung cancer incidentally detected on CT lung cancer screening and was treated by Oncology here at [facility]     COPD diagnosed due to chronic smoking history and is under the care of Pulmonology Sim RUSHING and is on Trelegy and duoneb, don't seem to be helping so she was referred to Cardiology, waiting on records       She recently had a left pleurocentesis done which was reported as an exudate and referral to Cardiology was requested. I don't see a cytology on the pleural fluid, negative culture report. Referral was placed and she saw Dr Trivedi (will need records)     She has a h/o  long standing nausea and her appetite is not what it used to be. She is on protonix. She has gained 4 lbs since last visit, she has good days and bad. She stopped her reglan due to the tremors.      Her gastroenterologist is Dr Birmingham and she has had several testing done  Abdominal US done showed fatty liver and contracted GB, HIDA scan was normal. CT is august showed fatty liver and some mild diverticulitis. She has had EGD and colonoscopy done by Dr Birmingham. Hasn't seen him in some time     She was placed on Reglan and compazine by  Smita Plaza NP. She developed some tremors which was concerning for TD so I asked her to discontinue reglan. She was referred to Neurology who felt this was due to Parkinsons and she was started on Sinimet, states tremors are somewhat improved     She had an appointment with Oncology in March but missed it and I don't see a follow up. Referral was  placed again, will call their office    Depression somewhat controlled with medication, she has good days and bad days. No HI/SI                 Review of Systems   Constitutional:  Positive for malaise/fatigue. Negative for chills and fever.   HENT:  Negative for hearing loss.    Eyes:  Negative for blurred vision.   Respiratory:  Positive for cough. Negative for shortness of breath and wheezing.    Cardiovascular:  Negative for chest pain, palpitations and leg swelling.   Gastrointestinal:  Negative for abdominal pain, blood in stool, constipation, diarrhea, melena, nausea and vomiting.   Genitourinary:  Negative for dysuria, frequency and urgency.   Musculoskeletal:  Negative for falls.   Skin:  Negative for rash.   Neurological:  Positive for tremors. Negative for dizziness and headaches.   Endo/Heme/Allergies:  Does not bruise/bleed easily.   Psychiatric/Behavioral:  Positive for depression. Negative for substance abuse and suicidal ideas. The patient is not nervous/anxious.      Objective:     Physical Exam  Vitals reviewed.   Constitutional:       Appearance: Normal appearance.   HENT:      Head: Normocephalic.      Mouth/Throat:      Mouth: Mucous membranes are moist.      Pharynx: Oropharynx is clear.   Eyes:      Extraocular Movements: Extraocular movements intact.      Conjunctiva/sclera: Conjunctivae normal.      Pupils: Pupils are equal, round, and reactive to light.   Cardiovascular:      Rate and Rhythm: Normal rate and regular rhythm.   Pulmonary:      Effort: Pulmonary effort is normal.      Breath sounds: Normal breath sounds.   Abdominal:      General: Bowel sounds are normal.   Musculoskeletal:      Right lower leg: No edema.      Left lower leg: No edema.   Skin:     General: Skin is warm.      Capillary Refill: Capillary refill takes less than 2 seconds.   Neurological:      Mental Status: She is alert and oriented to person, place, and time.   Psychiatric:         Mood and Affect: Mood normal.  "      /84 (BP Location: Right arm, Patient Position: Sitting, BP Method: Medium (Automatic))   Pulse 102   Ht 5' 6" (1.676 m)   Wt 52.2 kg (115 lb)   LMP  (LMP Unknown)   SpO2 99%   BMI 18.56 kg/m²     Assessment:       ICD-10-CM ICD-9-CM   1. Encounter for screening for malignant neoplasm of breast, unspecified screening modality  Z12.39 V76.10   2. Restless leg  G25.81 333.94   3. Encounter for screening mammogram for malignant neoplasm of breast  Z12.31 V76.12   4. Depression, unspecified depression type  F32.A 311   5. Smoking addiction  F17.200 305.1   6. COPD with acute exacerbation  J44.1 491.21       Plan:     Medication List with Changes/Refills   Current Medications    ALBUTEROL (PROVENTIL/VENTOLIN HFA) 90 MCG/ACTUATION INHALER    Inhale 1 puff into the lungs every 6 (six) hours as needed for Wheezing or Shortness of Breath. Rescue    ALBUTEROL-IPRATROPIUM (DUO-NEB) 2.5 MG-0.5 MG/3 ML NEBULIZER SOLUTION    Take 3 mLs by nebulization every 6 (six) hours as needed for Wheezing. Rescue    ALBUTEROL-IPRATROPIUM (DUO-NEB) 2.5 MG-0.5 MG/3 ML NEBULIZER SOLUTION    Take 3 mLs by nebulization every 6 (six) hours as needed for Wheezing. Rescue    AMITRIPTYLINE (ELAVIL) 25 MG TABLET    TAKE 1 TABLET BY MOUTH EVERY NIGHT AT BEDTIME AS NEEDED    CETIRIZINE (ZYRTEC) 10 MG TABLET    Take 1 tablet (10 mg total) by mouth once daily.    FAMOTIDINE (PEPCID) 20 MG TABLET    TAKE 1 TABLET BY MOUTH TWICE DAILY    FLUTICASONE PROPIONATE (FLONASE) 50 MCG/ACTUATION NASAL SPRAY    1 spray (50 mcg total) by Each Nostril route once daily.    FLUTICASONE-UMECLIDIN-VILANTER (TRELEGY ELLIPTA) 100-62.5-25 MCG DSDV    Inhale 1 puff into the lungs once daily.    HYDROCODONE-ACETAMINOPHEN (NORCO) 5-325 MG PER TABLET        LIDOCAINE VISCOUS 2 % SOLUTION        LORAZEPAM (ATIVAN) 0.5 MG TABLET    Take 1 tablet (0.5 mg total) by mouth every 8 (eight) hours as needed (tremors).    MEGESTROL (MEGACE) 40 MG TAB    Take 1 tablet (40 " mg total) by mouth once daily.    PANTOPRAZOLE (PROTONIX) 40 MG TABLET    TAKE 1 TABLET BY MOUTH ONCE DAILY    PROCHLORPERAZINE (COMPAZINE) 10 MG TABLET    TAKE 1 TABLET BY MOUTH THREE TIMES DAILY    SERTRALINE (ZOLOFT) 50 MG TABLET    TAKE 1 TABLET BY MOUTH EVERY DAY    VARENICLINE (CHANTIX) 1 MG TAB    Take 1 tablet (1 mg total) by mouth 2 (two) times daily.   Changed and/or Refilled Medications    Modified Medication Previous Medication    GABAPENTIN (NEURONTIN) 400 MG CAPSULE gabapentin (NEURONTIN) 400 MG capsule       Take 1 capsule (400 mg total) by mouth 3 (three) times daily.    Take 1 capsule (400 mg total) by mouth 3 (three) times daily.        1. Encounter for screening for malignant neoplasm of breast, unspecified screening modality  -     Cancel: Mammo Digital Diagnostic Bilat with Robin; Future; Expected date: 08/19/2024  -     Mammo Digital Screening Bilat; Future; Expected date: 08/20/2024    2. Restless leg  -     gabapentin (NEURONTIN) 400 MG capsule; Take 1 capsule (400 mg total) by mouth 3 (three) times daily.  Dispense: 90 capsule; Refill: 3    3. Encounter for screening mammogram for malignant neoplasm of breast  -     Mammo Digital Screening Bilat; Future; Expected date: 08/20/2024    4. Depression, unspecified depression type    5. Smoking addiction    6. COPD with acute exacerbation       COPD not responding to medication, possible fibrosis from chemo/radiation?   Continue current medications       Assistance with smoking cessation was offered, including:  []  Medications  [x]  Counseling  []  Printed Information on Smoking Cessation  []  Referral to a Smoking Cessation Program    Patient was counseled regarding smoking for 3-10 minutes.     Future Appointments   Date Time Provider Department Center   8/21/2024 11:40 AM Bourgeois, Krystin, NP [facility]  [facility]   4/21/2025  9:20 AM Mirian Robertson MD [id number]

## 2024-09-06 ENCOUNTER — PATIENT MESSAGE (OUTPATIENT)
Dept: PRIMARY CARE CLINIC | Facility: CLINIC | Age: 59
End: 2024-09-06
Payer: MEDICARE

## 2024-09-12 RX ORDER — AMITRIPTYLINE HYDROCHLORIDE 25 MG/1
25 TABLET, FILM COATED ORAL NIGHTLY PRN
Qty: 30 TABLET | Refills: 3 | Status: SHIPPED | OUTPATIENT
Start: 2024-09-12

## 2024-09-22 DIAGNOSIS — R63.4 WEIGHT LOSS: ICD-10-CM

## 2024-09-23 RX ORDER — MEGESTROL ACETATE 40 MG/1
40 TABLET ORAL DAILY
Qty: 90 TABLET | Refills: 3 | Status: SHIPPED | OUTPATIENT
Start: 2024-09-23 | End: 2024-10-23

## 2024-10-07 ENCOUNTER — OUTSIDE PLACE OF SERVICE (OUTPATIENT)
Dept: PULMONOLOGY | Facility: CLINIC | Age: 59
End: 2024-10-07
Payer: MEDICARE

## 2024-10-07 PROCEDURE — 99291 CRITICAL CARE FIRST HOUR: CPT | Mod: ,,, | Performed by: INTERNAL MEDICINE

## 2024-10-08 ENCOUNTER — OUTSIDE PLACE OF SERVICE (OUTPATIENT)
Dept: PULMONOLOGY | Facility: CLINIC | Age: 59
End: 2024-10-08
Payer: MEDICARE

## 2024-10-08 PROCEDURE — 99291 CRITICAL CARE FIRST HOUR: CPT | Mod: ,,, | Performed by: INTERNAL MEDICINE

## 2024-10-09 ENCOUNTER — OUTSIDE PLACE OF SERVICE (OUTPATIENT)
Dept: PULMONOLOGY | Facility: CLINIC | Age: 59
End: 2024-10-09
Payer: MEDICARE

## 2024-10-10 ENCOUNTER — OUTSIDE PLACE OF SERVICE (OUTPATIENT)
Dept: PULMONOLOGY | Facility: CLINIC | Age: 59
End: 2024-10-10
Payer: MEDICARE

## 2024-10-11 ENCOUNTER — OUTSIDE PLACE OF SERVICE (OUTPATIENT)
Dept: PULMONOLOGY | Facility: CLINIC | Age: 59
End: 2024-10-11
Payer: MEDICARE

## 2025-01-14 ENCOUNTER — TELEPHONE (OUTPATIENT)
Dept: SMOKING CESSATION | Facility: CLINIC | Age: 60
End: 2025-01-14
Payer: MEDICARE

## 2025-01-14 NOTE — TELEPHONE ENCOUNTER
Opened encounter to resolve smoking cessation quit encounter. No further action needed. Patient .